# Patient Record
Sex: FEMALE | Race: WHITE | NOT HISPANIC OR LATINO | Employment: FULL TIME | URBAN - METROPOLITAN AREA
[De-identification: names, ages, dates, MRNs, and addresses within clinical notes are randomized per-mention and may not be internally consistent; named-entity substitution may affect disease eponyms.]

---

## 2017-01-30 ENCOUNTER — OFFICE VISIT (OUTPATIENT)
Dept: RETAIL CLINIC | Facility: CLINIC | Age: 47
End: 2017-01-30

## 2017-01-30 VITALS
HEART RATE: 75 BPM | RESPIRATION RATE: 24 BRPM | SYSTOLIC BLOOD PRESSURE: 124 MMHG | TEMPERATURE: 98.6 F | DIASTOLIC BLOOD PRESSURE: 88 MMHG | OXYGEN SATURATION: 98 %

## 2017-01-30 DIAGNOSIS — H10.32 ACUTE BACTERIAL CONJUNCTIVITIS OF LEFT EYE: Primary | ICD-10-CM

## 2017-01-30 PROCEDURE — 99213 OFFICE O/P EST LOW 20 MIN: CPT | Performed by: NURSE PRACTITIONER

## 2017-01-30 RX ORDER — ALLOPURINOL 300 MG/1
300 TABLET ORAL NIGHTLY
COMMUNITY

## 2017-01-30 RX ORDER — POLYMYXIN B SULFATE AND TRIMETHOPRIM 1; 10000 MG/ML; [USP'U]/ML
1 SOLUTION OPHTHALMIC EVERY 4 HOURS
Qty: 1 EACH | Refills: 0 | Status: SHIPPED | OUTPATIENT
Start: 2017-01-30 | End: 2017-02-06

## 2017-01-30 RX ORDER — PARICALCITOL 2 UG/1
2 CAPSULE, LIQUID FILLED ORAL NIGHTLY
COMMUNITY

## 2017-01-30 RX ORDER — ATORVASTATIN CALCIUM 40 MG/1
40 TABLET, FILM COATED ORAL NIGHTLY
COMMUNITY
End: 2020-11-22 | Stop reason: HOSPADM

## 2017-01-30 RX ORDER — SODIUM BICARBONATE 650 MG/1
1300 TABLET ORAL 2 TIMES DAILY
COMMUNITY

## 2017-01-30 RX ORDER — PREDNISONE 1 MG/1
5 TABLET ORAL NIGHTLY
COMMUNITY

## 2017-01-30 RX ORDER — ATENOLOL 100 MG/1
100 TABLET ORAL NIGHTLY
COMMUNITY
End: 2020-11-22 | Stop reason: HOSPADM

## 2017-01-30 RX ORDER — GLYBURIDE 2.5 MG/1
2.5 TABLET ORAL EVERY MORNING
COMMUNITY

## 2017-01-30 RX ORDER — OMEPRAZOLE 40 MG/1
40 CAPSULE, DELAYED RELEASE ORAL EVERY MORNING
COMMUNITY

## 2017-01-30 NOTE — LETTER
January 30, 2017     Patient: Kinga Taylor   YOB: 1970   Date of Visit: 1/30/2017       To Whom It May Concern:    It is my medical opinion that Kinga Taylor may return to work on 2/1/2017.           Sincerely,        [facility]    CC: No Recipients

## 2017-01-30 NOTE — PROGRESS NOTES
Ligia Taylor is a 46 y.o. female.     Conjunctivitis    The current episode started yesterday. The problem has been gradually worsening. The problem is mild. Nothing relieves the symptoms. Associated symptoms include eye itching, eye discharge, eye pain and eye redness. Pertinent negatives include no fever, no decreased vision, no double vision, no photophobia, no diarrhea, no nausea, no vomiting, no congestion, no rhinorrhea and no cough. The eye pain is mild. The left eye is affected. The eye pain is not associated with movement. The eyelid exhibits no abnormality.        The following portions of the patient's history were reviewed and updated as appropriate: allergies, current medications, past family history, past medical history, past social history, past surgical history and problem list.    Review of Systems   Constitutional: Negative for chills, diaphoresis, fatigue and fever.   HENT: Negative for congestion, rhinorrhea, sinus pressure and sneezing.    Eyes: Positive for pain, discharge, redness and itching. Negative for double vision, photophobia and visual disturbance.   Respiratory: Negative for cough.    Gastrointestinal: Negative for diarrhea, nausea and vomiting.   Allergic/Immunologic: Negative for environmental allergies.       Objective   Physical Exam   Constitutional: She is oriented to person, place, and time. She appears well-developed and well-nourished. She does not appear ill. No distress.   HENT:   Head: Normocephalic.   Right Ear: Hearing, tympanic membrane, external ear and ear canal normal.   Left Ear: Hearing, tympanic membrane, external ear and ear canal normal.   Nose: Nose normal. No rhinorrhea or sinus tenderness.   Mouth/Throat: Oropharynx is clear and moist and mucous membranes are normal. Tonsils are 2+ on the right. Tonsils are 2+ on the left. No tonsillar exudate.   Eyes: EOM are normal. Pupils are equal, round, and reactive to light. Left conjunctiva is  injected.   With corrective lenses on  Right 20/25  Left 20/40  Bilateral 20/25   Neck: No tracheal deviation present.   Cardiovascular: Normal rate, regular rhythm, S1 normal, S2 normal and normal heart sounds.    Pulmonary/Chest: Effort normal and breath sounds normal. No accessory muscle usage. No respiratory distress.   Abdominal: Soft. Bowel sounds are normal. There is no tenderness.   Lymphadenopathy:     She has no cervical adenopathy.   Neurological: She is alert and oriented to person, place, and time.   Skin: Skin is warm and dry.   Vitals reviewed.      Assessment/Plan   Diagnoses and all orders for this visit:    Acute bacterial conjunctivitis of left eye  -     trimethoprim-polymyxin b (POLYTRIM) 17431-9.1 UNIT/ML-% ophthalmic solution; Administer 1 drop into the left eye Every 4 (Four) Hours for 7 days.    Other orders  -     Tacrolimus (PROGRAF PO); Take  by mouth.  -     PREDNISONE PO; Take  by mouth.  -     ATENOLOL PO; Take  by mouth.  -     SODIUM BICARBONATE PO; Take  by mouth.  -     OMEPRAZOLE PO; Take  by mouth.  -     FLUCONAZOLE PO; Take  by mouth.  -     GLYBURIDE PO; Take  by mouth.  -     FAMOTIDINE PO; Take  by mouth.  -     ATORVASTATIN CALCIUM PO; Take  by mouth.  -     ALENDRONATE SODIUM PO; Take  by mouth.  -     PARICALCITOL PO; Take  by mouth.  -     ALLOPURINOL PO; Take  by mouth.  -     HYDROCODONE-ACETAMINOPHEN PO; Take  by mouth.  -     Ascorbic Acid (VITAMIN C ER PO); Take  by mouth.  -     VITAMIN E PO; Take  by mouth.  -     Calcium-Magnesium-Vitamin D (CALCIUM 500 PO); Take  by mouth.  -     Coenzyme Q10 (COQ10 PO); Take  by mouth.  -     CRANBERRY PO; Take  by mouth.  -     BuPROPion HCl (WELLBUTRIN PO); Take  by mouth.

## 2017-01-30 NOTE — MR AVS SNAPSHOT
Kinga Taylor   1/30/2017 10:15 AM   Office Visit    Dept Phone:  [phone]   Encounter #:  [id number]    Provider:  LEEANN CASTAÑEDA   Department:  [facility]                Your Full Care Plan              Today's Medication Changes          These changes are accurate as of: 1/30/17 11:21 AM.  If you have any questions, ask your nurse or doctor.               New Medication(s)Ordered:     trimethoprim-polymyxin b 39291-3.1 UNIT/ML-% ophthalmic solution   Commonly known as:  POLYTRIM   Administer 1 drop into the left eye Every 4 (Four) Hours for 7 days.            Where to Get Your Medications      These medications were sent to [facility] [address] [place], [place] - [address] [facility] [phone] [facility] [phone] [address] [address]     Phone:  [phone]     trimethoprim-polymyxin b 52070-4.1 UNIT/ML-% ophthalmic solution                  Your Updated Medication List          This list is accurate as of: 1/30/17 11:21 AM.  Always use your most recent med list.                ALENDRONATE SODIUM PO       ALLOPURINOL PO       ATENOLOL PO       ATORVASTATIN CALCIUM PO       CALCIUM 500 PO       COQ10 PO       CRANBERRY PO       FAMOTIDINE PO       FLUCONAZOLE PO       GLYBURIDE PO       HYDROCODONE-ACETAMINOPHEN PO       OMEPRAZOLE PO       PARICALCITOL PO       PREDNISONE PO       PROGRAF PO       SODIUM BICARBONATE PO       trimethoprim-polymyxin b 52969-6.1 UNIT/ML-% ophthalmic solution   Commonly known as:  POLYTRIM   Administer 1 drop into the left eye Every 4 (Four) Hours for 7 days.       VITAMIN C ER PO       VITAMIN E PO       WELLBUTRIN PO               You Were Diagnosed With        Codes Comments    Acute bacterial conjunctivitis of left eye    -  Primary ICD-10-CM: H10.32  ICD-9-CM: 372.03       Instructions    Bacterial Conjunctivitis  Bacterial conjunctivitis, commonly called pink eye, is an inflammation of the  clear membrane that covers the white part of the eye (conjunctiva). The inflammation can also happen on the underside of the eyelids. The blood vessels in the conjunctiva become inflamed, causing the eye to become red or pink. Bacterial conjunctivitis may spread easily from one eye to another and from person to person (contagious).   CAUSES   Bacterial conjunctivitis is caused by bacteria. The bacteria may come from your own skin, your upper respiratory tract, or from someone else with bacterial conjunctivitis.  SYMPTOMS   The normally white color of the eye or the underside of the eyelid is usually pink or red. The pink eye is usually associated with irritation, tearing, and some sensitivity to light. Bacterial conjunctivitis is often associated with a thick, yellowish discharge from the eye. The discharge may turn into a crust on the eyelids overnight, which causes your eyelids to stick together. If a discharge is present, there may also be some blurred vision in the affected eye.  DIAGNOSIS   Bacterial conjunctivitis is diagnosed by your caregiver through an eye exam and the symptoms that you report. Your caregiver looks for changes in the surface tissues of your eyes, which may point to the specific type of conjunctivitis. A sample of any discharge may be collected on a cotton-tip swab if you have a severe case of conjunctivitis, if your cornea is affected, or if you keep getting repeat infections that do not respond to treatment. The sample will be sent to a lab to see if the inflammation is caused by a bacterial infection and to see if the infection will respond to antibiotic medicines.  TREATMENT   · Bacterial conjunctivitis is treated with antibiotics. Antibiotic eyedrops are most often used. However, antibiotic ointments are also available. Antibiotics pills are sometimes used. Artificial tears or eye washes may ease discomfort.  HOME CARE INSTRUCTIONS   · To ease discomfort, apply a cool, clean washcloth  to your eye for 10-20 minutes, 3-4 times a day.  · Gently wipe away any drainage from your eye with a warm, wet washcloth or a cotton ball.  · Wash your hands often with soap and water. Use paper towels to dry your hands.  · Do not share towels or washcloths. This may spread the infection.  · Change or wash your pillowcase every day.  · You should not use eye makeup until the infection is gone.  · Do not operate machinery or drive if your vision is blurred.  · Stop using contact lenses. Ask your caregiver how to sterilize or replace your contacts before using them again. This depends on the type of contact lenses that you use.  · When applying medicine to the infected eye, do not touch the edge of your eyelid with the eyedrop bottle or ointment tube.  SEEK IMMEDIATE MEDICAL CARE IF:   · Your infection has not improved within 3 days after beginning treatment.  · You had yellow discharge from your eye and it returns.  · You have increased eye pain.  · Your eye redness is spreading.  · Your vision becomes blurred.  · You have a fever or persistent symptoms for more than 2-3 days.  · You have a fever and your symptoms suddenly get worse.  · You have facial pain, redness, or swelling.  MAKE SURE YOU:   · Understand these instructions.  · Will watch your condition.  · Will get help right away if you are not doing well or get worse.     This information is not intended to replace advice given to you by your health care provider. Make sure you discuss any questions you have with your health care provider.     Document Released: 12/18/2006 Document Revised: 01/08/2016 Document Reviewed: 05/20/2013  [employer] Interactive Patient Education ©2016 [employer]       Patient Instructions History      Upcoming Appointments     Visit Type Date Time Department    OFFICE VISIT 1/30/2017 10:15 AM [facility] [place]      [employer] Signup     [facility] [employer] allows you to send messages to your doctor, view your test results,  renew your prescriptions, schedule appointments, and more. To sign up, go to [employer]Mobile Authentication and click on the Sign Up Now link in the New User? box. Enter your [employer] Activation Code exactly as it appears below along with the last four digits of your Social Security Number and your Date of Birth () to complete the sign-up process. If you do not sign up before the expiration date, you must request a new code.    [employer] Activation Code: [email or web address]  Expires: 2017 11:20 AM    If you have questions, you can email [email or web address] or call [phone] to talk to our [employer] staff. Remember, [employer] is NOT to be used for urgent needs. For medical emergencies, dial 911.               Other Info from Your Visit           Allergies     Aldomet [Methyldopa]  Anaphylaxis    Dilaudid [Hydromorphone Hcl]  Itching    Morphine And Related  Itching      Reason for Visit     Conjunctivitis Left/painful/crusty/redness  x 1 day      Vital Signs     Blood Pressure Pulse Temperature Respirations Oxygen Saturation Smoking Status    124/88 75 98.6 °F (37 °C) 24 98% Never Smoker      Problems and Diagnoses Noted     Acute bacterial conjunctivitis of left eye    -  Primary

## 2017-05-24 ENCOUNTER — HOSPITAL ENCOUNTER (OUTPATIENT)
Dept: GENERAL RADIOLOGY | Facility: HOSPITAL | Age: 47
Discharge: HOME | End: 2017-05-24
Admitting: ORTHOPAEDIC SURGERY

## 2017-05-24 ENCOUNTER — APPOINTMENT (OUTPATIENT)
Dept: PREADMISSION TESTING | Facility: HOSPITAL | Age: 47
End: 2017-05-24

## 2017-05-24 VITALS
HEIGHT: 63 IN | WEIGHT: 175 LBS | OXYGEN SATURATION: 97 % | TEMPERATURE: 98 F | DIASTOLIC BLOOD PRESSURE: 86 MMHG | RESPIRATION RATE: 20 BRPM | SYSTOLIC BLOOD PRESSURE: 124 MMHG | HEART RATE: 79 BPM | BODY MASS INDEX: 31.01 KG/M2

## 2017-05-24 LAB
ABO GROUP BLD: NORMAL
ANION GAP SERPL CALCULATED.3IONS-SCNC: 13.6 MMOL/L
BACTERIA UR QL AUTO: ABNORMAL /HPF
BILIRUB UR QL STRIP: NEGATIVE
BLD GP AB SCN SERPL QL: NEGATIVE
BUN BLD-MCNC: 19 MG/DL (ref 6–20)
BUN/CREAT SERPL: 11.6 (ref 7–25)
CALCIUM SPEC-SCNC: 9.2 MG/DL (ref 8.6–10.5)
CHLORIDE SERPL-SCNC: 102 MMOL/L (ref 98–107)
CLARITY UR: ABNORMAL
CO2 SERPL-SCNC: 24.4 MMOL/L (ref 22–29)
COLOR UR: YELLOW
CREAT BLD-MCNC: 1.64 MG/DL (ref 0.57–1)
CRP SERPL-MCNC: 0.07 MG/DL (ref 0–0.5)
DEPRECATED RDW RBC AUTO: 50.8 FL (ref 37–54)
ERYTHROCYTE [DISTWIDTH] IN BLOOD BY AUTOMATED COUNT: 14.9 % (ref 11.7–13)
ERYTHROCYTE [SEDIMENTATION RATE] IN BLOOD: 20 MM/HR (ref 0–20)
GFR SERPL CREATININE-BSD FRML MDRD: 33 ML/MIN/1.73
GLUCOSE BLD-MCNC: 97 MG/DL (ref 65–99)
GLUCOSE UR STRIP-MCNC: NEGATIVE MG/DL
HBA1C MFR BLD: 6.4 % (ref 4.8–5.6)
HCT VFR BLD AUTO: 35.7 % (ref 35.6–45.5)
HGB BLD-MCNC: 12.1 G/DL (ref 11.9–15.5)
HGB UR QL STRIP.AUTO: ABNORMAL
HYALINE CASTS UR QL AUTO: ABNORMAL /LPF
INR PPP: 1.03 (ref 0.9–1.1)
KETONES UR QL STRIP: NEGATIVE
LEUKOCYTE ESTERASE UR QL STRIP.AUTO: ABNORMAL
MCH RBC QN AUTO: 31.7 PG (ref 26.9–32)
MCHC RBC AUTO-ENTMCNC: 33.9 G/DL (ref 32.4–36.3)
MCV RBC AUTO: 93.5 FL (ref 80.5–98.2)
NITRITE UR QL STRIP: NEGATIVE
PH UR STRIP.AUTO: 6.5 [PH] (ref 5–8)
PLATELET # BLD AUTO: 159 10*3/MM3 (ref 140–500)
PMV BLD AUTO: 10.3 FL (ref 6–12)
POTASSIUM BLD-SCNC: 4.4 MMOL/L (ref 3.5–5.2)
PROT UR QL STRIP: NEGATIVE
PROTHROMBIN TIME: 13.1 SECONDS (ref 11.7–14.2)
RBC # BLD AUTO: 3.82 10*6/MM3 (ref 3.9–5.2)
RBC # UR: ABNORMAL /HPF
REF LAB TEST METHOD: ABNORMAL
RH BLD: POSITIVE
SODIUM BLD-SCNC: 140 MMOL/L (ref 136–145)
SP GR UR STRIP: 1.01 (ref 1–1.03)
SQUAMOUS #/AREA URNS HPF: ABNORMAL /HPF
UROBILINOGEN UR QL STRIP: ABNORMAL
WBC NRBC COR # BLD: 11.18 10*3/MM3 (ref 4.5–10.7)
WBC UR QL AUTO: ABNORMAL /HPF

## 2017-05-24 PROCEDURE — 86900 BLOOD TYPING SEROLOGIC ABO: CPT | Performed by: ORTHOPAEDIC SURGERY

## 2017-05-24 PROCEDURE — 86850 RBC ANTIBODY SCREEN: CPT | Performed by: ORTHOPAEDIC SURGERY

## 2017-05-24 PROCEDURE — 87086 URINE CULTURE/COLONY COUNT: CPT | Performed by: ORTHOPAEDIC SURGERY

## 2017-05-24 PROCEDURE — 86901 BLOOD TYPING SEROLOGIC RH(D): CPT | Performed by: ORTHOPAEDIC SURGERY

## 2017-05-24 PROCEDURE — 85652 RBC SED RATE AUTOMATED: CPT | Performed by: ORTHOPAEDIC SURGERY

## 2017-05-24 PROCEDURE — 93010 ELECTROCARDIOGRAM REPORT: CPT | Performed by: INTERNAL MEDICINE

## 2017-05-24 PROCEDURE — 36415 COLL VENOUS BLD VENIPUNCTURE: CPT

## 2017-05-24 PROCEDURE — 81001 URINALYSIS AUTO W/SCOPE: CPT | Performed by: ORTHOPAEDIC SURGERY

## 2017-05-24 PROCEDURE — 83036 HEMOGLOBIN GLYCOSYLATED A1C: CPT | Performed by: ORTHOPAEDIC SURGERY

## 2017-05-24 PROCEDURE — 86140 C-REACTIVE PROTEIN: CPT | Performed by: ORTHOPAEDIC SURGERY

## 2017-05-24 PROCEDURE — 85027 COMPLETE CBC AUTOMATED: CPT | Performed by: ORTHOPAEDIC SURGERY

## 2017-05-24 PROCEDURE — 93005 ELECTROCARDIOGRAM TRACING: CPT

## 2017-05-24 PROCEDURE — 71020 HC CHEST PA AND LATERAL: CPT

## 2017-05-24 PROCEDURE — 85610 PROTHROMBIN TIME: CPT | Performed by: ORTHOPAEDIC SURGERY

## 2017-05-24 PROCEDURE — 87077 CULTURE AEROBIC IDENTIFY: CPT | Performed by: ORTHOPAEDIC SURGERY

## 2017-05-24 PROCEDURE — 80048 BASIC METABOLIC PNL TOTAL CA: CPT | Performed by: ORTHOPAEDIC SURGERY

## 2017-05-24 PROCEDURE — 87186 SC STD MICRODIL/AGAR DIL: CPT | Performed by: ORTHOPAEDIC SURGERY

## 2017-05-24 RX ORDER — HYDROCODONE BITARTRATE AND ACETAMINOPHEN 10; 325 MG/1; MG/1
1 TABLET ORAL 4 TIMES DAILY
COMMUNITY
End: 2017-06-29 | Stop reason: HOSPADM

## 2017-05-24 RX ORDER — CHLORHEXIDINE GLUCONATE 500 MG/1
1 CLOTH TOPICAL
COMMUNITY
End: 2017-06-29 | Stop reason: HOSPADM

## 2017-05-26 LAB — BACTERIA SPEC AEROBE CULT: ABNORMAL

## 2017-06-28 ENCOUNTER — ANESTHESIA (OUTPATIENT)
Dept: PERIOP | Facility: HOSPITAL | Age: 47
End: 2017-06-28

## 2017-06-28 ENCOUNTER — ANESTHESIA EVENT (OUTPATIENT)
Dept: PERIOP | Facility: HOSPITAL | Age: 47
End: 2017-06-28

## 2017-06-28 ENCOUNTER — APPOINTMENT (OUTPATIENT)
Dept: GENERAL RADIOLOGY | Facility: HOSPITAL | Age: 47
End: 2017-06-28

## 2017-06-28 ENCOUNTER — HOSPITAL ENCOUNTER (INPATIENT)
Facility: HOSPITAL | Age: 47
LOS: 1 days | Discharge: HOME HEALTH | End: 2017-06-29
Attending: ORTHOPAEDIC SURGERY | Admitting: ORTHOPAEDIC SURGERY

## 2017-06-28 DIAGNOSIS — T84.018A FAILED TOTAL HIP ARTHROPLASTY (HCC): ICD-10-CM

## 2017-06-28 DIAGNOSIS — Z96.649 FAILED TOTAL HIP ARTHROPLASTY (HCC): ICD-10-CM

## 2017-06-28 DIAGNOSIS — R26.2 DIFFICULTY WALKING: Primary | ICD-10-CM

## 2017-06-28 LAB
ABO GROUP BLD: NORMAL
B-HCG UR QL: NEGATIVE
BACTERIA UR QL AUTO: ABNORMAL /HPF
BILIRUB UR QL STRIP: NEGATIVE
BLD GP AB SCN SERPL QL: NEGATIVE
CLARITY UR: ABNORMAL
COLOR UR: YELLOW
GLUCOSE BLDC GLUCOMTR-MCNC: 118 MG/DL (ref 70–130)
GLUCOSE UR STRIP-MCNC: NEGATIVE MG/DL
HCT VFR BLD AUTO: 34.1 % (ref 35.6–45.5)
HGB BLD-MCNC: 11.7 G/DL (ref 11.9–15.5)
HGB UR QL STRIP.AUTO: ABNORMAL
HYALINE CASTS UR QL AUTO: ABNORMAL /LPF
INR PPP: 1.09 (ref 0.9–1.1)
INTERNAL NEGATIVE CONTROL: NEGATIVE
INTERNAL POSITIVE CONTROL: POSITIVE
KETONES UR QL STRIP: NEGATIVE
LEUKOCYTE ESTERASE UR QL STRIP.AUTO: NEGATIVE
Lab: NORMAL
NITRITE UR QL STRIP: NEGATIVE
PH UR STRIP.AUTO: 6 [PH] (ref 5–8)
PROT UR QL STRIP: ABNORMAL
PROTHROMBIN TIME: 13.7 SECONDS (ref 11.7–14.2)
RBC # UR: ABNORMAL /HPF
REF LAB TEST METHOD: ABNORMAL
RH BLD: POSITIVE
SP GR UR STRIP: 1.01 (ref 1–1.03)
SQUAMOUS #/AREA URNS HPF: ABNORMAL /HPF
UROBILINOGEN UR QL STRIP: ABNORMAL
WBC UR QL AUTO: ABNORMAL /HPF

## 2017-06-28 PROCEDURE — C1776 JOINT DEVICE (IMPLANTABLE): HCPCS | Performed by: ORTHOPAEDIC SURGERY

## 2017-06-28 PROCEDURE — 25010000002 NEOSTIGMINE PER 0.5 MG: Performed by: NURSE ANESTHETIST, CERTIFIED REGISTERED

## 2017-06-28 PROCEDURE — 87070 CULTURE OTHR SPECIMN AEROBIC: CPT | Performed by: ORTHOPAEDIC SURGERY

## 2017-06-28 PROCEDURE — 87075 CULTR BACTERIA EXCEPT BLOOD: CPT | Performed by: ORTHOPAEDIC SURGERY

## 2017-06-28 PROCEDURE — 97162 PT EVAL MOD COMPLEX 30 MIN: CPT

## 2017-06-28 PROCEDURE — 25010000002 CLONIDINE PER 1 MG: Performed by: ORTHOPAEDIC SURGERY

## 2017-06-28 PROCEDURE — 82962 GLUCOSE BLOOD TEST: CPT

## 2017-06-28 PROCEDURE — 25010000002 ONDANSETRON PER 1 MG: Performed by: NURSE ANESTHETIST, CERTIFIED REGISTERED

## 2017-06-28 PROCEDURE — 25010000002 EPINEPHRINE PER 0.1 MG: Performed by: ORTHOPAEDIC SURGERY

## 2017-06-28 PROCEDURE — 25010000002 HYDROMORPHONE PER 4 MG: Performed by: ANESTHESIOLOGY

## 2017-06-28 PROCEDURE — 63710000001 PREDNISONE PER 5 MG: Performed by: ORTHOPAEDIC SURGERY

## 2017-06-28 PROCEDURE — 85018 HEMOGLOBIN: CPT | Performed by: ORTHOPAEDIC SURGERY

## 2017-06-28 PROCEDURE — 25010000002 ROPIVACAINE PER 1 MG: Performed by: ORTHOPAEDIC SURGERY

## 2017-06-28 PROCEDURE — 25010000002 FENTANYL CITRATE (PF) 100 MCG/2ML SOLUTION: Performed by: ANESTHESIOLOGY

## 2017-06-28 PROCEDURE — 86900 BLOOD TYPING SEROLOGIC ABO: CPT | Performed by: ORTHOPAEDIC SURGERY

## 2017-06-28 PROCEDURE — 72170 X-RAY EXAM OF PELVIS: CPT

## 2017-06-28 PROCEDURE — 25010000002 KETOROLAC TROMETHAMINE PER 15 MG: Performed by: ORTHOPAEDIC SURGERY

## 2017-06-28 PROCEDURE — 25010000002 MIDAZOLAM PER 1 MG: Performed by: ANESTHESIOLOGY

## 2017-06-28 PROCEDURE — 85610 PROTHROMBIN TIME: CPT | Performed by: ORTHOPAEDIC SURGERY

## 2017-06-28 PROCEDURE — 81001 URINALYSIS AUTO W/SCOPE: CPT | Performed by: ORTHOPAEDIC SURGERY

## 2017-06-28 PROCEDURE — 87086 URINE CULTURE/COLONY COUNT: CPT | Performed by: ORTHOPAEDIC SURGERY

## 2017-06-28 PROCEDURE — 0SUE09Z SUPPLEMENT LEFT HIP JOINT, ACETABULAR SURFACE WITH LINER, OPEN APPROACH: ICD-10-PCS | Performed by: ORTHOPAEDIC SURGERY

## 2017-06-28 PROCEDURE — 87205 SMEAR GRAM STAIN: CPT | Performed by: ORTHOPAEDIC SURGERY

## 2017-06-28 PROCEDURE — 0SPB09Z REMOVAL OF LINER FROM LEFT HIP JOINT, OPEN APPROACH: ICD-10-PCS | Performed by: ORTHOPAEDIC SURGERY

## 2017-06-28 PROCEDURE — 85014 HEMATOCRIT: CPT | Performed by: ORTHOPAEDIC SURGERY

## 2017-06-28 PROCEDURE — 0SPS0JZ REMOVAL OF SYNTHETIC SUBSTITUTE FROM LEFT HIP JOINT, FEMORAL SURFACE, OPEN APPROACH: ICD-10-PCS | Performed by: ORTHOPAEDIC SURGERY

## 2017-06-28 PROCEDURE — 97110 THERAPEUTIC EXERCISES: CPT

## 2017-06-28 PROCEDURE — 86850 RBC ANTIBODY SCREEN: CPT | Performed by: ORTHOPAEDIC SURGERY

## 2017-06-28 PROCEDURE — 25010000002 DEXAMETHASONE PER 1 MG: Performed by: NURSE ANESTHETIST, CERTIFIED REGISTERED

## 2017-06-28 PROCEDURE — 0SRS0JZ REPLACEMENT OF LEFT HIP JOINT, FEMORAL SURFACE WITH SYNTHETIC SUBSTITUTE, OPEN APPROACH: ICD-10-PCS | Performed by: ORTHOPAEDIC SURGERY

## 2017-06-28 PROCEDURE — 25010000002 PROPOFOL 10 MG/ML EMULSION: Performed by: NURSE ANESTHETIST, CERTIFIED REGISTERED

## 2017-06-28 PROCEDURE — 86901 BLOOD TYPING SEROLOGIC RH(D): CPT | Performed by: ORTHOPAEDIC SURGERY

## 2017-06-28 PROCEDURE — 25010000003 CEFAZOLIN IN DEXTROSE 2-4 GM/100ML-% SOLUTION: Performed by: ORTHOPAEDIC SURGERY

## 2017-06-28 PROCEDURE — 25010000002 METHYLPREDNISOLONE PER 125 MG: Performed by: ANESTHESIOLOGY

## 2017-06-28 PROCEDURE — 25010000003 CEFAZOLIN IN DEXTROSE 2-4 GM/100ML-% SOLUTION: Performed by: NURSE ANESTHETIST, CERTIFIED REGISTERED

## 2017-06-28 DEVICE — IMPLANTABLE DEVICE: Type: IMPLANTABLE DEVICE | Site: HIP | Status: FUNCTIONAL

## 2017-06-28 RX ORDER — MIDAZOLAM HYDROCHLORIDE 1 MG/ML
1 INJECTION INTRAMUSCULAR; INTRAVENOUS
Status: DISCONTINUED | OUTPATIENT
Start: 2017-06-28 | End: 2017-06-28 | Stop reason: HOSPADM

## 2017-06-28 RX ORDER — EPHEDRINE SULFATE 50 MG/ML
5 INJECTION, SOLUTION INTRAVENOUS ONCE AS NEEDED
Status: DISCONTINUED | OUTPATIENT
Start: 2017-06-28 | End: 2017-06-28 | Stop reason: HOSPADM

## 2017-06-28 RX ORDER — ONDANSETRON 2 MG/ML
4 INJECTION INTRAMUSCULAR; INTRAVENOUS ONCE AS NEEDED
Status: DISCONTINUED | OUTPATIENT
Start: 2017-06-28 | End: 2017-06-28 | Stop reason: HOSPADM

## 2017-06-28 RX ORDER — CEFAZOLIN SODIUM 2 G/100ML
2 INJECTION, SOLUTION INTRAVENOUS EVERY 8 HOURS
Status: COMPLETED | OUTPATIENT
Start: 2017-06-28 | End: 2017-06-29

## 2017-06-28 RX ORDER — NALOXONE HCL 0.4 MG/ML
0.2 VIAL (ML) INJECTION AS NEEDED
Status: DISCONTINUED | OUTPATIENT
Start: 2017-06-28 | End: 2017-06-28 | Stop reason: HOSPADM

## 2017-06-28 RX ORDER — ATORVASTATIN CALCIUM 20 MG/1
40 TABLET, FILM COATED ORAL NIGHTLY
Status: DISCONTINUED | OUTPATIENT
Start: 2017-06-28 | End: 2017-06-29 | Stop reason: HOSPADM

## 2017-06-28 RX ORDER — SODIUM CHLORIDE 0.9 % (FLUSH) 0.9 %
1-10 SYRINGE (ML) INJECTION AS NEEDED
Status: DISCONTINUED | OUTPATIENT
Start: 2017-06-28 | End: 2017-06-29 | Stop reason: HOSPADM

## 2017-06-28 RX ORDER — HYDROMORPHONE HYDROCHLORIDE 1 MG/ML
0.5 INJECTION, SOLUTION INTRAMUSCULAR; INTRAVENOUS; SUBCUTANEOUS
Status: DISCONTINUED | OUTPATIENT
Start: 2017-06-28 | End: 2017-06-28 | Stop reason: HOSPADM

## 2017-06-28 RX ORDER — PROMETHAZINE HYDROCHLORIDE 25 MG/ML
12.5 INJECTION, SOLUTION INTRAMUSCULAR; INTRAVENOUS ONCE AS NEEDED
Status: DISCONTINUED | OUTPATIENT
Start: 2017-06-28 | End: 2017-06-29 | Stop reason: HOSPADM

## 2017-06-28 RX ORDER — ONDANSETRON 2 MG/ML
4 INJECTION INTRAMUSCULAR; INTRAVENOUS ONCE AS NEEDED
Status: COMPLETED | OUTPATIENT
Start: 2017-06-28 | End: 2017-06-28

## 2017-06-28 RX ORDER — PROMETHAZINE HYDROCHLORIDE 25 MG/1
25 TABLET ORAL ONCE AS NEEDED
Status: DISCONTINUED | OUTPATIENT
Start: 2017-06-28 | End: 2017-06-28 | Stop reason: HOSPADM

## 2017-06-28 RX ORDER — CEFAZOLIN SODIUM 2 G/100ML
2 INJECTION, SOLUTION INTRAVENOUS ONCE
Status: DISCONTINUED | OUTPATIENT
Start: 2017-06-28 | End: 2017-06-28 | Stop reason: HOSPADM

## 2017-06-28 RX ORDER — PROMETHAZINE HYDROCHLORIDE 25 MG/1
25 TABLET ORAL ONCE AS NEEDED
Status: DISCONTINUED | OUTPATIENT
Start: 2017-06-28 | End: 2017-06-29 | Stop reason: HOSPADM

## 2017-06-28 RX ORDER — PROMETHAZINE HYDROCHLORIDE 25 MG/1
25 SUPPOSITORY RECTAL ONCE AS NEEDED
Status: DISCONTINUED | OUTPATIENT
Start: 2017-06-28 | End: 2017-06-29 | Stop reason: HOSPADM

## 2017-06-28 RX ORDER — PROMETHAZINE HYDROCHLORIDE 25 MG/ML
12.5 INJECTION, SOLUTION INTRAMUSCULAR; INTRAVENOUS ONCE AS NEEDED
Status: DISCONTINUED | OUTPATIENT
Start: 2017-06-28 | End: 2017-06-28 | Stop reason: HOSPADM

## 2017-06-28 RX ORDER — ALLOPURINOL 300 MG/1
300 TABLET ORAL NIGHTLY
Status: DISCONTINUED | OUTPATIENT
Start: 2017-06-28 | End: 2017-06-29 | Stop reason: HOSPADM

## 2017-06-28 RX ORDER — DIPHENHYDRAMINE HYDROCHLORIDE 50 MG/ML
12.5 INJECTION INTRAMUSCULAR; INTRAVENOUS
Status: DISCONTINUED | OUTPATIENT
Start: 2017-06-28 | End: 2017-06-28 | Stop reason: HOSPADM

## 2017-06-28 RX ORDER — DOXYCYCLINE HYCLATE 100 MG/1
100 CAPSULE ORAL 2 TIMES DAILY
Status: DISCONTINUED | OUTPATIENT
Start: 2017-06-28 | End: 2017-06-29 | Stop reason: HOSPADM

## 2017-06-28 RX ORDER — FLUMAZENIL 0.1 MG/ML
0.2 INJECTION INTRAVENOUS AS NEEDED
Status: DISCONTINUED | OUTPATIENT
Start: 2017-06-28 | End: 2017-06-29 | Stop reason: HOSPADM

## 2017-06-28 RX ORDER — FENTANYL CITRATE 50 UG/ML
50 INJECTION, SOLUTION INTRAMUSCULAR; INTRAVENOUS
Status: DISCONTINUED | OUTPATIENT
Start: 2017-06-28 | End: 2017-06-28 | Stop reason: HOSPADM

## 2017-06-28 RX ORDER — LIDOCAINE HYDROCHLORIDE 20 MG/ML
INJECTION, SOLUTION INFILTRATION; PERINEURAL AS NEEDED
Status: DISCONTINUED | OUTPATIENT
Start: 2017-06-28 | End: 2017-06-28 | Stop reason: SURG

## 2017-06-28 RX ORDER — OXYCODONE AND ACETAMINOPHEN 10; 325 MG/1; MG/1
1 TABLET ORAL EVERY 4 HOURS PRN
Status: DISCONTINUED | OUTPATIENT
Start: 2017-06-28 | End: 2017-06-29 | Stop reason: HOSPADM

## 2017-06-28 RX ORDER — LABETALOL HYDROCHLORIDE 5 MG/ML
5 INJECTION, SOLUTION INTRAVENOUS
Status: DISCONTINUED | OUTPATIENT
Start: 2017-06-28 | End: 2017-06-29 | Stop reason: HOSPADM

## 2017-06-28 RX ORDER — OXYCODONE HCL 10 MG/1
10 TABLET, FILM COATED, EXTENDED RELEASE ORAL EVERY 12 HOURS SCHEDULED
Status: DISCONTINUED | OUTPATIENT
Start: 2017-06-28 | End: 2017-06-29 | Stop reason: HOSPADM

## 2017-06-28 RX ORDER — ONDANSETRON 4 MG/1
4 TABLET, FILM COATED ORAL EVERY 6 HOURS PRN
Status: DISCONTINUED | OUTPATIENT
Start: 2017-06-28 | End: 2017-06-29 | Stop reason: HOSPADM

## 2017-06-28 RX ORDER — FAMOTIDINE 10 MG
10 TABLET ORAL NIGHTLY
Status: DISCONTINUED | OUTPATIENT
Start: 2017-06-28 | End: 2017-06-29 | Stop reason: HOSPADM

## 2017-06-28 RX ORDER — HYDRALAZINE HYDROCHLORIDE 20 MG/ML
5 INJECTION INTRAMUSCULAR; INTRAVENOUS
Status: DISCONTINUED | OUTPATIENT
Start: 2017-06-28 | End: 2017-06-29 | Stop reason: HOSPADM

## 2017-06-28 RX ORDER — FERROUS SULFATE 325(65) MG
325 TABLET ORAL
Status: DISCONTINUED | OUTPATIENT
Start: 2017-06-28 | End: 2017-06-29 | Stop reason: HOSPADM

## 2017-06-28 RX ORDER — WARFARIN SODIUM 5 MG/1
5 TABLET ORAL
Status: COMPLETED | OUTPATIENT
Start: 2017-06-28 | End: 2017-06-28

## 2017-06-28 RX ORDER — SODIUM CHLORIDE, SODIUM LACTATE, POTASSIUM CHLORIDE, CALCIUM CHLORIDE 600; 310; 30; 20 MG/100ML; MG/100ML; MG/100ML; MG/100ML
100 INJECTION, SOLUTION INTRAVENOUS CONTINUOUS
Status: DISCONTINUED | OUTPATIENT
Start: 2017-06-28 | End: 2017-06-29 | Stop reason: HOSPADM

## 2017-06-28 RX ORDER — GLYCOPYRROLATE 0.2 MG/ML
INJECTION INTRAMUSCULAR; INTRAVENOUS AS NEEDED
Status: DISCONTINUED | OUTPATIENT
Start: 2017-06-28 | End: 2017-06-28 | Stop reason: SURG

## 2017-06-28 RX ORDER — OXYCODONE AND ACETAMINOPHEN 7.5; 325 MG/1; MG/1
1 TABLET ORAL ONCE AS NEEDED
Status: DISCONTINUED | OUTPATIENT
Start: 2017-06-28 | End: 2017-06-28 | Stop reason: HOSPADM

## 2017-06-28 RX ORDER — GLIPIZIDE 5 MG/1
5 TABLET ORAL
Status: DISCONTINUED | OUTPATIENT
Start: 2017-06-28 | End: 2017-06-29 | Stop reason: HOSPADM

## 2017-06-28 RX ORDER — PROPOFOL 10 MG/ML
VIAL (ML) INTRAVENOUS AS NEEDED
Status: DISCONTINUED | OUTPATIENT
Start: 2017-06-28 | End: 2017-06-28 | Stop reason: SURG

## 2017-06-28 RX ORDER — PARICALCITOL 1 UG/1
2 CAPSULE, LIQUID FILLED ORAL NIGHTLY
Status: DISCONTINUED | OUTPATIENT
Start: 2017-06-28 | End: 2017-06-29 | Stop reason: HOSPADM

## 2017-06-28 RX ORDER — PROMETHAZINE HYDROCHLORIDE 25 MG/1
12.5 TABLET ORAL ONCE AS NEEDED
Status: DISCONTINUED | OUTPATIENT
Start: 2017-06-28 | End: 2017-06-28 | Stop reason: HOSPADM

## 2017-06-28 RX ORDER — DIPHENHYDRAMINE HYDROCHLORIDE 50 MG/ML
12.5 INJECTION INTRAMUSCULAR; INTRAVENOUS
Status: DISCONTINUED | OUTPATIENT
Start: 2017-06-28 | End: 2017-06-29 | Stop reason: HOSPADM

## 2017-06-28 RX ORDER — SODIUM BICARBONATE 650 MG/1
325 TABLET ORAL 2 TIMES DAILY
Status: DISCONTINUED | OUTPATIENT
Start: 2017-06-28 | End: 2017-06-29 | Stop reason: HOSPADM

## 2017-06-28 RX ORDER — SODIUM CHLORIDE, SODIUM LACTATE, POTASSIUM CHLORIDE, CALCIUM CHLORIDE 600; 310; 30; 20 MG/100ML; MG/100ML; MG/100ML; MG/100ML
9 INJECTION, SOLUTION INTRAVENOUS CONTINUOUS
Status: DISCONTINUED | OUTPATIENT
Start: 2017-06-28 | End: 2017-06-29 | Stop reason: HOSPADM

## 2017-06-28 RX ORDER — NALOXONE HCL 0.4 MG/ML
0.2 VIAL (ML) INJECTION AS NEEDED
Status: DISCONTINUED | OUTPATIENT
Start: 2017-06-28 | End: 2017-06-29 | Stop reason: HOSPADM

## 2017-06-28 RX ORDER — FAMOTIDINE 10 MG/ML
20 INJECTION, SOLUTION INTRAVENOUS ONCE
Status: COMPLETED | OUTPATIENT
Start: 2017-06-28 | End: 2017-06-28

## 2017-06-28 RX ORDER — CEFAZOLIN SODIUM 2 G/100ML
INJECTION, SOLUTION INTRAVENOUS AS NEEDED
Status: DISCONTINUED | OUTPATIENT
Start: 2017-06-28 | End: 2017-06-28 | Stop reason: SURG

## 2017-06-28 RX ORDER — FLUMAZENIL 0.1 MG/ML
0.2 INJECTION INTRAVENOUS AS NEEDED
Status: DISCONTINUED | OUTPATIENT
Start: 2017-06-28 | End: 2017-06-28 | Stop reason: HOSPADM

## 2017-06-28 RX ORDER — SENNA AND DOCUSATE SODIUM 50; 8.6 MG/1; MG/1
2 TABLET, FILM COATED ORAL 2 TIMES DAILY
Status: DISCONTINUED | OUTPATIENT
Start: 2017-06-28 | End: 2017-06-29 | Stop reason: HOSPADM

## 2017-06-28 RX ORDER — FENTANYL CITRATE 50 UG/ML
50 INJECTION, SOLUTION INTRAMUSCULAR; INTRAVENOUS
Status: DISCONTINUED | OUTPATIENT
Start: 2017-06-28 | End: 2017-06-29 | Stop reason: HOSPADM

## 2017-06-28 RX ORDER — ONDANSETRON 2 MG/ML
INJECTION INTRAMUSCULAR; INTRAVENOUS AS NEEDED
Status: DISCONTINUED | OUTPATIENT
Start: 2017-06-28 | End: 2017-06-28 | Stop reason: SURG

## 2017-06-28 RX ORDER — HYDRALAZINE HYDROCHLORIDE 20 MG/ML
5 INJECTION INTRAMUSCULAR; INTRAVENOUS
Status: DISCONTINUED | OUTPATIENT
Start: 2017-06-28 | End: 2017-06-28 | Stop reason: HOSPADM

## 2017-06-28 RX ORDER — POVIDONE-IODINE 10 MG/G
OINTMENT TOPICAL AS NEEDED
Status: DISCONTINUED | OUTPATIENT
Start: 2017-06-28 | End: 2017-06-28 | Stop reason: HOSPADM

## 2017-06-28 RX ORDER — DIPHENHYDRAMINE HCL 25 MG
25 CAPSULE ORAL EVERY 6 HOURS PRN
Status: DISCONTINUED | OUTPATIENT
Start: 2017-06-28 | End: 2017-06-29 | Stop reason: HOSPADM

## 2017-06-28 RX ORDER — ROCURONIUM BROMIDE 10 MG/ML
INJECTION, SOLUTION INTRAVENOUS AS NEEDED
Status: DISCONTINUED | OUTPATIENT
Start: 2017-06-28 | End: 2017-06-28 | Stop reason: SURG

## 2017-06-28 RX ORDER — PREDNISONE 1 MG/1
5 TABLET ORAL NIGHTLY
Status: DISCONTINUED | OUTPATIENT
Start: 2017-06-28 | End: 2017-06-29 | Stop reason: HOSPADM

## 2017-06-28 RX ORDER — DOCUSATE SODIUM 100 MG/1
100 CAPSULE, LIQUID FILLED ORAL 2 TIMES DAILY PRN
Status: DISCONTINUED | OUTPATIENT
Start: 2017-06-28 | End: 2017-06-29 | Stop reason: HOSPADM

## 2017-06-28 RX ORDER — DEXAMETHASONE SODIUM PHOSPHATE 10 MG/ML
INJECTION INTRAMUSCULAR; INTRAVENOUS AS NEEDED
Status: DISCONTINUED | OUTPATIENT
Start: 2017-06-28 | End: 2017-06-28 | Stop reason: SURG

## 2017-06-28 RX ORDER — BUPROPION HYDROCHLORIDE 100 MG/1
300 TABLET ORAL EVERY MORNING
Status: DISCONTINUED | OUTPATIENT
Start: 2017-06-29 | End: 2017-06-29 | Stop reason: HOSPADM

## 2017-06-28 RX ORDER — ATENOLOL 100 MG/1
100 TABLET ORAL NIGHTLY
Status: DISCONTINUED | OUTPATIENT
Start: 2017-06-28 | End: 2017-06-29 | Stop reason: HOSPADM

## 2017-06-28 RX ORDER — ASCORBIC ACID 500 MG
500 TABLET ORAL DAILY
Status: DISCONTINUED | OUTPATIENT
Start: 2017-06-28 | End: 2017-06-29 | Stop reason: HOSPADM

## 2017-06-28 RX ORDER — PANTOPRAZOLE SODIUM 40 MG/1
40 TABLET, DELAYED RELEASE ORAL EVERY MORNING
Status: DISCONTINUED | OUTPATIENT
Start: 2017-06-29 | End: 2017-06-29 | Stop reason: HOSPADM

## 2017-06-28 RX ORDER — ACETAMINOPHEN 325 MG/1
325 TABLET ORAL EVERY 4 HOURS PRN
Status: DISCONTINUED | OUTPATIENT
Start: 2017-06-28 | End: 2017-06-29 | Stop reason: HOSPADM

## 2017-06-28 RX ORDER — EPHEDRINE SULFATE 50 MG/ML
5 INJECTION, SOLUTION INTRAVENOUS ONCE AS NEEDED
Status: DISCONTINUED | OUTPATIENT
Start: 2017-06-28 | End: 2017-06-29 | Stop reason: HOSPADM

## 2017-06-28 RX ORDER — BISACODYL 5 MG/1
10 TABLET, DELAYED RELEASE ORAL DAILY PRN
Status: DISCONTINUED | OUTPATIENT
Start: 2017-06-28 | End: 2017-06-29 | Stop reason: HOSPADM

## 2017-06-28 RX ORDER — LABETALOL HYDROCHLORIDE 5 MG/ML
5 INJECTION, SOLUTION INTRAVENOUS
Status: DISCONTINUED | OUTPATIENT
Start: 2017-06-28 | End: 2017-06-28 | Stop reason: HOSPADM

## 2017-06-28 RX ORDER — PROMETHAZINE HYDROCHLORIDE 25 MG/1
12.5 TABLET ORAL ONCE AS NEEDED
Status: DISCONTINUED | OUTPATIENT
Start: 2017-06-28 | End: 2017-06-29 | Stop reason: HOSPADM

## 2017-06-28 RX ORDER — MIDAZOLAM HYDROCHLORIDE 1 MG/ML
2 INJECTION INTRAMUSCULAR; INTRAVENOUS
Status: DISCONTINUED | OUTPATIENT
Start: 2017-06-28 | End: 2017-06-28 | Stop reason: HOSPADM

## 2017-06-28 RX ORDER — SODIUM CHLORIDE 0.9 % (FLUSH) 0.9 %
1-10 SYRINGE (ML) INJECTION AS NEEDED
Status: DISCONTINUED | OUTPATIENT
Start: 2017-06-28 | End: 2017-06-28 | Stop reason: HOSPADM

## 2017-06-28 RX ORDER — HYDROCODONE BITARTRATE AND ACETAMINOPHEN 7.5; 325 MG/1; MG/1
1 TABLET ORAL ONCE AS NEEDED
Status: DISCONTINUED | OUTPATIENT
Start: 2017-06-28 | End: 2017-06-28 | Stop reason: HOSPADM

## 2017-06-28 RX ORDER — METHYLPREDNISOLONE SODIUM SUCCINATE 125 MG/2ML
100 INJECTION, POWDER, LYOPHILIZED, FOR SOLUTION INTRAMUSCULAR; INTRAVENOUS ONCE
Status: COMPLETED | OUTPATIENT
Start: 2017-06-28 | End: 2017-06-28

## 2017-06-28 RX ORDER — PROMETHAZINE HYDROCHLORIDE 25 MG/1
25 SUPPOSITORY RECTAL ONCE AS NEEDED
Status: DISCONTINUED | OUTPATIENT
Start: 2017-06-28 | End: 2017-06-28 | Stop reason: HOSPADM

## 2017-06-28 RX ADMIN — PARICALCITOL 2 MCG: 1 CAPSULE ORAL at 21:37

## 2017-06-28 RX ADMIN — PREDNISONE 5 MG: 5 TABLET ORAL at 21:37

## 2017-06-28 RX ADMIN — ONDANSETRON 4 MG: 2 INJECTION INTRAMUSCULAR; INTRAVENOUS at 09:39

## 2017-06-28 RX ADMIN — FENTANYL CITRATE 100 MCG: 50 INJECTION INTRAMUSCULAR; INTRAVENOUS at 09:14

## 2017-06-28 RX ADMIN — HYDROMORPHONE HYDROCHLORIDE 0.5 MG: 1 INJECTION, SOLUTION INTRAMUSCULAR; INTRAVENOUS; SUBCUTANEOUS at 12:05

## 2017-06-28 RX ADMIN — PROPOFOL 150 MG: 10 INJECTION, EMULSION INTRAVENOUS at 09:16

## 2017-06-28 RX ADMIN — FENTANYL CITRATE 100 MCG: 50 INJECTION INTRAMUSCULAR; INTRAVENOUS at 09:22

## 2017-06-28 RX ADMIN — ATENOLOL 100 MG: 100 TABLET ORAL at 21:37

## 2017-06-28 RX ADMIN — CEFAZOLIN SODIUM 2 G: 2 INJECTION, SOLUTION INTRAVENOUS at 09:24

## 2017-06-28 RX ADMIN — WARFARIN SODIUM 5 MG: 5 TABLET ORAL at 17:20

## 2017-06-28 RX ADMIN — NEOSTIGMINE METHYLSULFATE 2 MG: 1 INJECTION INTRAMUSCULAR; INTRAVENOUS; SUBCUTANEOUS at 10:15

## 2017-06-28 RX ADMIN — DEXAMETHASONE SODIUM PHOSPHATE 6 MG: 10 INJECTION INTRAMUSCULAR; INTRAVENOUS at 09:39

## 2017-06-28 RX ADMIN — FAMOTIDINE 20 MG: 10 INJECTION INTRAVENOUS at 08:53

## 2017-06-28 RX ADMIN — MIDAZOLAM 1 MG: 1 INJECTION INTRAMUSCULAR; INTRAVENOUS at 08:53

## 2017-06-28 RX ADMIN — ONDANSETRON 4 MG: 2 INJECTION INTRAMUSCULAR; INTRAVENOUS at 19:20

## 2017-06-28 RX ADMIN — EPINEPHRINE: 1 INJECTION PARENTERAL at 09:50

## 2017-06-28 RX ADMIN — CEFAZOLIN SODIUM 2 G: 2 INJECTION, SOLUTION INTRAVENOUS at 17:20

## 2017-06-28 RX ADMIN — ALLOPURINOL 300 MG: 300 TABLET ORAL at 21:37

## 2017-06-28 RX ADMIN — ATORVASTATIN CALCIUM 40 MG: 20 TABLET, FILM COATED ORAL at 21:37

## 2017-06-28 RX ADMIN — OXYCODONE HYDROCHLORIDE AND ACETAMINOPHEN 1 TABLET: 10; 325 TABLET ORAL at 15:29

## 2017-06-28 RX ADMIN — LIDOCAINE HYDROCHLORIDE 60 MG: 20 INJECTION, SOLUTION INFILTRATION; PERINEURAL at 09:16

## 2017-06-28 RX ADMIN — NEOSTIGMINE METHYLSULFATE 1 MG: 1 INJECTION INTRAMUSCULAR; INTRAVENOUS; SUBCUTANEOUS at 10:25

## 2017-06-28 RX ADMIN — FENTANYL CITRATE 50 MCG: 50 INJECTION INTRAMUSCULAR; INTRAVENOUS at 09:46

## 2017-06-28 RX ADMIN — OXYCODONE HYDROCHLORIDE AND ACETAMINOPHEN 1 TABLET: 10; 325 TABLET ORAL at 21:37

## 2017-06-28 RX ADMIN — METHYLPREDNISOLONE SODIUM SUCCINATE 100 MG: 125 INJECTION, POWDER, FOR SOLUTION INTRAMUSCULAR; INTRAVENOUS at 08:53

## 2017-06-28 RX ADMIN — ROCURONIUM BROMIDE 35 MG: 10 INJECTION INTRAVENOUS at 09:16

## 2017-06-28 RX ADMIN — GLYCOPYRROLATE 0.2 MG: 0.2 INJECTION INTRAMUSCULAR; INTRAVENOUS at 10:15

## 2017-06-28 RX ADMIN — SODIUM CHLORIDE, POTASSIUM CHLORIDE, SODIUM LACTATE AND CALCIUM CHLORIDE 9 ML/HR: 600; 310; 30; 20 INJECTION, SOLUTION INTRAVENOUS at 08:45

## 2017-06-28 RX ADMIN — ONDANSETRON 4 MG: 4 TABLET, FILM COATED ORAL at 19:38

## 2017-06-28 NOTE — ANESTHESIA PROCEDURE NOTES
Airway  Urgency: elective    Date/Time: 6/28/2017 9:18 AM  Airway not difficult    General Information and Staff    Patient location during procedure: OR  Anesthesiologist: WALDO CORTEZ  CRNA: SALINA TRENT    Indications and Patient Condition  Indications for airway management: airway protection    Preoxygenated: yes  Mask difficulty assessment: 1 - vent by mask    Final Airway Details  Final airway type: endotracheal airway      Successful airway: ETT  Cuffed: yes   Successful intubation technique: direct laryngoscopy  Endotracheal tube insertion site: oral  Blade: Melissa  Blade size: #3  ETT size: 7.0 mm  Cormack-Lehane Classification: grade I - full view of glottis  Placement verified by: chest auscultation and capnometry   Cuff volume (mL): 5  Measured from: lips  ETT to lips (cm): 20  Number of attempts at approach: 1    Additional Comments  Smooth IV induction. Trachea intubated. Cuff up. Ett secured. BEBS. Dentition intact without injury.

## 2017-06-28 NOTE — ANESTHESIA POSTPROCEDURE EVALUATION
Patient: Kinga Taylor    Procedure Summary     Date Anesthesia Start Anesthesia Stop Room / Location    06/28/17 0908 1033 [facility] OR 11 / [facility] OR       Procedure Diagnosis Surgeon Provider    LT TOTAL HIP ARTHROPLASTY REVISION (Left Hip) No diagnosis on file. MD Adelfo Brandon MD          Anesthesia Type: general  Last vitals  /76 (06/28/17 1115)    Temp      Pulse 54 (06/28/17 1115)   Resp 16 (06/28/17 1115)    SpO2 100 % (06/28/17 1115)      Post Anesthesia Care and Evaluation    Patient location during evaluation: PACU  Patient participation: complete - patient participated  Level of consciousness: awake and alert  Pain management: adequate  Airway patency: patent  Anesthetic complications: No anesthetic complications    Cardiovascular status: acceptable  Respiratory status: acceptable  Hydration status: acceptable

## 2017-06-28 NOTE — ANESTHESIA PREPROCEDURE EVALUATION
Anesthesia Evaluation     Patient summary reviewed and Nursing notes reviewed   no history of anesthetic complications:  NPO Solid Status: > 8 hours  NPO Liquid Status: > 2 hours     Airway   Mallampati: II  TM distance: >3 FB  Neck ROM: full  no difficulty expected  Dental - normal exam     Pulmonary - negative pulmonary ROS and normal exam   Cardiovascular - normal exam  Exercise tolerance: good (4-7 METS)    ECG reviewed  Rhythm: regular  Rate: normal    (+) hypertension well controlled, hyperlipidemia      Neuro/Psych  (+) tremors, psychiatric history Depression,    GI/Hepatic/Renal/Endo    (+) obesity,  hiatal hernia, GERD well controlled, renal disease CRI,     ROS Comment: S/p renal tx 22yrs ago, currently with mild CRI, Cr 1.5    Musculoskeletal (-) negative ROS    Abdominal  - normal exam   Substance History - negative use     OB/GYN negative ob/gyn ROS         Other - negative ROS                                       Anesthesia Plan    ASA 3     general     intravenous induction   Anesthetic plan and risks discussed with patient.    Plan discussed with CRNA and attending.

## 2017-06-29 VITALS
BODY MASS INDEX: 31.47 KG/M2 | RESPIRATION RATE: 16 BRPM | OXYGEN SATURATION: 96 % | HEART RATE: 75 BPM | HEIGHT: 63 IN | WEIGHT: 177.6 LBS | SYSTOLIC BLOOD PRESSURE: 101 MMHG | DIASTOLIC BLOOD PRESSURE: 66 MMHG | TEMPERATURE: 97.6 F

## 2017-06-29 LAB
BACTERIA SPEC AEROBE CULT: NO GROWTH
HCT VFR BLD AUTO: 28.9 % (ref 35.6–45.5)
HGB BLD-MCNC: 9.8 G/DL (ref 11.9–15.5)
INR PPP: 1.31 (ref 0.9–1.1)
PROTHROMBIN TIME: 15.8 SECONDS (ref 11.7–14.2)

## 2017-06-29 PROCEDURE — 85018 HEMOGLOBIN: CPT | Performed by: ORTHOPAEDIC SURGERY

## 2017-06-29 PROCEDURE — 25010000002 ENOXAPARIN PER 10 MG: Performed by: ORTHOPAEDIC SURGERY

## 2017-06-29 PROCEDURE — 97150 GROUP THERAPEUTIC PROCEDURES: CPT

## 2017-06-29 PROCEDURE — 85014 HEMATOCRIT: CPT | Performed by: ORTHOPAEDIC SURGERY

## 2017-06-29 PROCEDURE — 25010000003 CEFAZOLIN IN DEXTROSE 2-4 GM/100ML-% SOLUTION: Performed by: ORTHOPAEDIC SURGERY

## 2017-06-29 PROCEDURE — 97110 THERAPEUTIC EXERCISES: CPT

## 2017-06-29 PROCEDURE — 85610 PROTHROMBIN TIME: CPT | Performed by: ORTHOPAEDIC SURGERY

## 2017-06-29 RX ORDER — WARFARIN SODIUM 4 MG/1
4 TABLET ORAL
Qty: 40 TABLET
Start: 2017-06-29 | End: 2020-09-23

## 2017-06-29 RX ORDER — WARFARIN SODIUM 5 MG/1
5 TABLET ORAL
Status: COMPLETED | OUTPATIENT
Start: 2017-06-29 | End: 2017-06-29

## 2017-06-29 RX ORDER — OXYCODONE AND ACETAMINOPHEN 10; 325 MG/1; MG/1
1 TABLET ORAL EVERY 4 HOURS PRN
Qty: 56 TABLET | Refills: 0 | Status: SHIPPED | OUTPATIENT
Start: 2017-06-29 | End: 2017-07-08

## 2017-06-29 RX ADMIN — FERROUS SULFATE TAB 325 MG (65 MG ELEMENTAL FE) 325 MG: 325 (65 FE) TAB at 08:53

## 2017-06-29 RX ADMIN — SODIUM BICARBONATE 325 MG: 650 TABLET, ORALLY DISINTEGRATING ORAL at 08:53

## 2017-06-29 RX ADMIN — WARFARIN SODIUM 5 MG: 5 TABLET ORAL at 11:07

## 2017-06-29 RX ADMIN — SENNA AND DOCUSATE SODIUM 2 TABLET: 50; 8.6 TABLET, FILM COATED ORAL at 08:53

## 2017-06-29 RX ADMIN — ENOXAPARIN SODIUM 40 MG: 40 INJECTION SUBCUTANEOUS at 08:53

## 2017-06-29 RX ADMIN — OXYCODONE HYDROCHLORIDE AND ACETAMINOPHEN 1 TABLET: 10; 325 TABLET ORAL at 11:01

## 2017-06-29 RX ADMIN — GLIPIZIDE 5 MG: 5 TABLET ORAL at 06:36

## 2017-06-29 RX ADMIN — CEFAZOLIN SODIUM 2 G: 2 INJECTION, SOLUTION INTRAVENOUS at 03:34

## 2017-06-29 RX ADMIN — PANTOPRAZOLE SODIUM 40 MG: 40 TABLET, DELAYED RELEASE ORAL at 06:36

## 2017-06-29 RX ADMIN — OXYCODONE HYDROCHLORIDE 10 MG: 10 TABLET, FILM COATED, EXTENDED RELEASE ORAL at 08:53

## 2017-06-29 RX ADMIN — OXYCODONE HYDROCHLORIDE AND ACETAMINOPHEN 1 TABLET: 10; 325 TABLET ORAL at 02:01

## 2017-06-29 RX ADMIN — OXYCODONE HYDROCHLORIDE AND ACETAMINOPHEN 1 TABLET: 10; 325 TABLET ORAL at 06:36

## 2017-06-29 RX ADMIN — BUPROPION HYDROCHLORIDE 300 MG: 100 TABLET, FILM COATED ORAL at 08:53

## 2017-07-01 LAB
BACTERIA SPEC AEROBE CULT: NORMAL
GRAM STN SPEC: NORMAL

## 2017-07-03 LAB — BACTERIA SPEC ANAEROBE CULT: NORMAL

## 2017-07-21 ENCOUNTER — TRANSCRIBE ORDERS (OUTPATIENT)
Dept: ADMINISTRATIVE | Facility: HOSPITAL | Age: 47
End: 2017-07-21

## 2017-07-21 ENCOUNTER — LAB (OUTPATIENT)
Dept: LAB | Facility: HOSPITAL | Age: 47
End: 2017-07-21
Attending: ORTHOPAEDIC SURGERY

## 2017-07-21 DIAGNOSIS — Z79.01 LONG TERM (CURRENT) USE OF ANTICOAGULANTS: Primary | ICD-10-CM

## 2017-07-21 DIAGNOSIS — Z79.01 LONG TERM (CURRENT) USE OF ANTICOAGULANTS: ICD-10-CM

## 2017-07-21 LAB
INR PPP: 2.16 (ref 0.9–1.1)
PROTHROMBIN TIME: 23.3 SECONDS (ref 11.7–14.2)

## 2017-07-21 PROCEDURE — 85610 PROTHROMBIN TIME: CPT | Performed by: ORTHOPAEDIC SURGERY

## 2017-07-21 PROCEDURE — 36415 COLL VENOUS BLD VENIPUNCTURE: CPT

## 2017-07-24 ENCOUNTER — LAB (OUTPATIENT)
Dept: LAB | Facility: HOSPITAL | Age: 47
End: 2017-07-24
Attending: ORTHOPAEDIC SURGERY

## 2017-07-24 ENCOUNTER — TRANSCRIBE ORDERS (OUTPATIENT)
Dept: ADMINISTRATIVE | Facility: HOSPITAL | Age: 47
End: 2017-07-24

## 2017-07-24 DIAGNOSIS — Z79.01 LONG TERM (CURRENT) USE OF ANTICOAGULANTS: Primary | ICD-10-CM

## 2017-07-24 DIAGNOSIS — Z79.01 LONG TERM (CURRENT) USE OF ANTICOAGULANTS: ICD-10-CM

## 2017-07-24 LAB
INR PPP: 1.74 (ref 0.9–1.1)
PROTHROMBIN TIME: 19.8 SECONDS (ref 11.7–14.2)

## 2017-07-24 PROCEDURE — 85610 PROTHROMBIN TIME: CPT | Performed by: ORTHOPAEDIC SURGERY

## 2017-07-24 PROCEDURE — 36415 COLL VENOUS BLD VENIPUNCTURE: CPT

## 2017-07-26 NOTE — OP NOTE
PREOPERATIVE DIAGNOSIS: [Polyethylene wear left total hip.]    POSTOPERATIVE DIAGNOSIS: [Same.]    PROCEDURE PERFORMED: [Revision of acetabular liner and femoral head.]    ANESTHESIA:  [Gen.]    SURGEON:  Wil Curtis MD    ASSISTANT SURGEON: []    BLOOD LOSS: 100 cc.[]    SPECIMEN: Cultures.    []This is a-year-old lady with a  painful left total hip.  X-rays reveal poly-wear on the insert for the acetabular component.  She has had to have this exchange once in 2006.  She is brought to the hospital today to have the poly-exchange and a new femoral head position.    Patient brought to the holding room given appropriate IV antibiotics.  The operating room given a general anesthetic.  She was then placed in the decubitus position with the left side up.  The left hip was prepped and draped in a sterile fashion.  Previous skin incision was opened subcutaneous dissected away and the fascia split longitudinally.  Posterior approach was carried out to the hip.  This was done the patient had the pseudocapsule opened.  Clear fluid was encountered.  This fluid was cultured and the patient then had the hip dislocated posteriorly.  The head was removed from the femoral component.  The femoral component was well fixed.  Retracted the femur anteriorly.  And the polyethylene was removed from the acetabular component.  It showed evidence of marked wear.  This was a 26 mm inside diameter poly-for 46 Omnifit cup.  The new liner was opened and impacted into the cup.  We then opened and new head which was a +1026 head.  The hip was reduced and stability was appropriate all directions.  The periarticular injection was used and then the wound was irrigated with a dilute Betadine solution.  The Pseudocapsule was repaired with 0 Vicryl the fascia was repaired with 0 Ethibond and the subcutaneous was closed with 0 and 2-0 Vicryl.  Staples were used to close the skin.  Sterile dressing was applied abduction pillow positioned and her  general anesthetic was reversed.  Blood loss was approximately 100 cc thank you

## 2019-06-26 ENCOUNTER — TRANSCRIBE ORDERS (OUTPATIENT)
Dept: ADMINISTRATIVE | Facility: HOSPITAL | Age: 49
End: 2019-06-26

## 2019-06-26 DIAGNOSIS — D63.1 ANEMIA IN CHRONIC KIDNEY DISEASE (CODE): Primary | ICD-10-CM

## 2019-06-26 DIAGNOSIS — N18.30 CHRONIC RENAL DISEASE, STAGE III (HCC): ICD-10-CM

## 2019-06-27 PROBLEM — D63.1 ANEMIA DUE TO STAGE 3 CHRONIC KIDNEY DISEASE TREATED WITH ERYTHROPOIETIN (HCC): Status: ACTIVE | Noted: 2019-06-27

## 2019-06-27 PROBLEM — N18.30 ANEMIA DUE TO STAGE 3 CHRONIC KIDNEY DISEASE TREATED WITH ERYTHROPOIETIN (HCC): Status: ACTIVE | Noted: 2019-06-27

## 2019-07-01 ENCOUNTER — HOSPITAL ENCOUNTER (OUTPATIENT)
Dept: INFUSION THERAPY | Facility: HOSPITAL | Age: 49
Discharge: HOME | End: 2019-07-01
Admitting: INTERNAL MEDICINE

## 2019-07-01 VITALS
BODY MASS INDEX: 29.86 KG/M2 | SYSTOLIC BLOOD PRESSURE: 115 MMHG | HEART RATE: 75 BPM | WEIGHT: 168.5 LBS | HEIGHT: 63 IN | RESPIRATION RATE: 16 BRPM | TEMPERATURE: 97.7 F | OXYGEN SATURATION: 100 % | DIASTOLIC BLOOD PRESSURE: 73 MMHG

## 2019-07-01 DIAGNOSIS — D63.1 ANEMIA DUE TO STAGE 3 CHRONIC KIDNEY DISEASE TREATED WITH ERYTHROPOIETIN (HCC): Primary | ICD-10-CM

## 2019-07-01 DIAGNOSIS — N18.30 ANEMIA DUE TO STAGE 3 CHRONIC KIDNEY DISEASE TREATED WITH ERYTHROPOIETIN (HCC): Primary | ICD-10-CM

## 2019-07-01 PROCEDURE — 96365 THER/PROPH/DIAG IV INF INIT: CPT

## 2019-07-01 PROCEDURE — 25010000002 FERUMOXYTOL 510 MG/17ML SOLUTION 510 MG VIAL: Performed by: INTERNAL MEDICINE

## 2019-07-01 PROCEDURE — 96374 THER/PROPH/DIAG INJ IV PUSH: CPT

## 2019-07-01 RX ADMIN — FERUMOXYTOL 510 MG: 510 INJECTION INTRAVENOUS at 09:54

## 2019-07-01 NOTE — PATIENT INSTRUCTIONS
Ferumoxytol injection  What is this medicine?  FERUMOXYTOL is an iron complex. Iron is used to make healthy red blood cells, which carry oxygen and nutrients throughout the body. This medicine is used to treat iron deficiency anemia.  This medicine may be used for other purposes; ask your health care provider or pharmacist if you have questions.  COMMON BRAND NAME(S): Feraheme  What should I tell my health care provider before I take this medicine?  They need to know if you have any of these conditions:  -anemia not caused by low iron levels  -high levels of iron in the blood  -magnetic resonance imaging (MRI) test scheduled  -an unusual or allergic reaction to iron, other medicines, foods, dyes, or preservatives  -pregnant or trying to get pregnant  -breast-feeding  How should I use this medicine?  This medicine is for injection into a vein. It is given by a health care professional in a hospital or clinic setting.  Talk to your pediatrician regarding the use of this medicine in children. Special care may be needed.  Overdosage: If you think you have taken too much of this medicine contact a poison control center or emergency room at once.  NOTE: This medicine is only for you. Do not share this medicine with others.  What if I miss a dose?  It is important not to miss your dose. Call your doctor or health care professional if you are unable to keep an appointment.  What may interact with this medicine?  This medicine may interact with the following medications:  -other iron products  This list may not describe all possible interactions. Give your health care provider a list of all the medicines, herbs, non-prescription drugs, or dietary supplements you use. Also tell them if you smoke, drink alcohol, or use illegal drugs. Some items may interact with your medicine.  What should I watch for while using this medicine?  Visit your doctor or healthcare professional regularly. Tell your doctor or healthcare professional  if your symptoms do not start to get better or if they get worse. You may need blood work done while you are taking this medicine.  You may need to follow a special diet. Talk to your doctor. Foods that contain iron include: whole grains/cereals, dried fruits, beans, or peas, leafy green vegetables, and organ meats (liver, kidney).  What side effects may I notice from receiving this medicine?  Side effects that you should report to your doctor or health care professional as soon as possible:  -allergic reactions like skin rash, itching or hives, swelling of the face, lips, or tongue  -breathing problems  -changes in blood pressure  -feeling faint or lightheaded, falls  -fever or chills  -flushing, sweating, or hot feelings  -swelling of the ankles or feet  Side effects that usually do not require medical attention (report to your doctor or health care professional if they continue or are bothersome):  -diarrhea  -headache  -nausea, vomiting  -stomach pain  This list may not describe all possible side effects. Call your doctor for medical advice about side effects. You may report side effects to FDA at 2-632-FDA-1068.  Where should I keep my medicine?  This drug is given in a hospital or clinic and will not be stored at home.  NOTE: This sheet is a summary. It may not cover all possible information. If you have questions about this medicine, talk to your doctor, pharmacist, or health care provider.  © 2019 Elsevier/Gold Standard (2018-02-05 20:21:10)

## 2019-07-15 ENCOUNTER — HOSPITAL ENCOUNTER (OUTPATIENT)
Dept: INFUSION THERAPY | Facility: HOSPITAL | Age: 49
Discharge: HOME | End: 2019-07-15
Admitting: INTERNAL MEDICINE

## 2019-07-15 VITALS
RESPIRATION RATE: 16 BRPM | SYSTOLIC BLOOD PRESSURE: 122 MMHG | DIASTOLIC BLOOD PRESSURE: 84 MMHG | TEMPERATURE: 97.5 F | OXYGEN SATURATION: 100 % | HEART RATE: 74 BPM

## 2019-07-15 DIAGNOSIS — N18.30 ANEMIA DUE TO STAGE 3 CHRONIC KIDNEY DISEASE TREATED WITH ERYTHROPOIETIN (HCC): Primary | ICD-10-CM

## 2019-07-15 DIAGNOSIS — D63.1 ANEMIA DUE TO STAGE 3 CHRONIC KIDNEY DISEASE TREATED WITH ERYTHROPOIETIN (HCC): Primary | ICD-10-CM

## 2019-07-15 PROCEDURE — 25010000002 FERUMOXYTOL 510 MG/17ML SOLUTION 510 MG VIAL: Performed by: INTERNAL MEDICINE

## 2019-07-15 PROCEDURE — 96374 THER/PROPH/DIAG INJ IV PUSH: CPT

## 2019-07-15 PROCEDURE — 96365 THER/PROPH/DIAG IV INF INIT: CPT

## 2019-07-15 RX ADMIN — FERUMOXYTOL 510 MG: 510 INJECTION INTRAVENOUS at 08:37

## 2019-07-15 NOTE — PATIENT INSTRUCTIONS
Ferumoxytol injection  What is this medicine?  FERUMOXYTOL is an iron complex. Iron is used to make healthy red blood cells, which carry oxygen and nutrients throughout the body. This medicine is used to treat iron deficiency anemia.  This medicine may be used for other purposes; ask your health care provider or pharmacist if you have questions.  COMMON BRAND NAME(S): Feraheme  What should I tell my health care provider before I take this medicine?  They need to know if you have any of these conditions:  -anemia not caused by low iron levels  -high levels of iron in the blood  -magnetic resonance imaging (MRI) test scheduled  -an unusual or allergic reaction to iron, other medicines, foods, dyes, or preservatives  -pregnant or trying to get pregnant  -breast-feeding  How should I use this medicine?  This medicine is for injection into a vein. It is given by a health care professional in a hospital or clinic setting.  Talk to your pediatrician regarding the use of this medicine in children. Special care may be needed.  Overdosage: If you think you have taken too much of this medicine contact a poison control center or emergency room at once.  NOTE: This medicine is only for you. Do not share this medicine with others.  What if I miss a dose?  It is important not to miss your dose. Call your doctor or health care professional if you are unable to keep an appointment.  What may interact with this medicine?  This medicine may interact with the following medications:  -other iron products  This list may not describe all possible interactions. Give your health care provider a list of all the medicines, herbs, non-prescription drugs, or dietary supplements you use. Also tell them if you smoke, drink alcohol, or use illegal drugs. Some items may interact with your medicine.  What should I watch for while using this medicine?  Visit your doctor or healthcare professional regularly. Tell your doctor or healthcare professional  if your symptoms do not start to get better or if they get worse. You may need blood work done while you are taking this medicine.  You may need to follow a special diet. Talk to your doctor. Foods that contain iron include: whole grains/cereals, dried fruits, beans, or peas, leafy green vegetables, and organ meats (liver, kidney).  What side effects may I notice from receiving this medicine?  Side effects that you should report to your doctor or health care professional as soon as possible:  -allergic reactions like skin rash, itching or hives, swelling of the face, lips, or tongue  -breathing problems  -changes in blood pressure  -feeling faint or lightheaded, falls  -fever or chills  -flushing, sweating, or hot feelings  -swelling of the ankles or feet  Side effects that usually do not require medical attention (report to your doctor or health care professional if they continue or are bothersome):  -diarrhea  -headache  -nausea, vomiting  -stomach pain  This list may not describe all possible side effects. Call your doctor for medical advice about side effects. You may report side effects to FDA at 9-717-FDA-1492.  Where should I keep my medicine?  This drug is given in a hospital or clinic and will not be stored at home.  NOTE: This sheet is a summary. It may not cover all possible information. If you have questions about this medicine, talk to your doctor, pharmacist, or health care provider.  © 2019 Elsevier/Gold Standard (2018-02-05 20:21:10)

## 2019-07-15 NOTE — PROGRESS NOTES
Patient tolerated infusion without complaint. Patient discharged 30 minutes after completion of infusion with AVS at 0930.

## 2020-06-01 ENCOUNTER — TRANSCRIBE ORDERS (OUTPATIENT)
Dept: ADMINISTRATIVE | Facility: HOSPITAL | Age: 50
End: 2020-06-01

## 2020-06-01 DIAGNOSIS — Z94.0 KIDNEY TRANSPLANTED: ICD-10-CM

## 2020-06-01 DIAGNOSIS — I10 HYPERTENSION, UNSPECIFIED TYPE: Primary | ICD-10-CM

## 2020-06-09 ENCOUNTER — HOSPITAL ENCOUNTER (OUTPATIENT)
Dept: ULTRASOUND IMAGING | Facility: HOSPITAL | Age: 50
Discharge: HOME | End: 2020-06-09

## 2020-06-09 ENCOUNTER — HOSPITAL ENCOUNTER (OUTPATIENT)
Dept: CARDIOLOGY | Facility: HOSPITAL | Age: 50
Discharge: HOME | End: 2020-06-09
Admitting: INTERNAL MEDICINE

## 2020-06-09 DIAGNOSIS — Z94.0 KIDNEY TRANSPLANTED: ICD-10-CM

## 2020-06-09 DIAGNOSIS — I10 HYPERTENSION, UNSPECIFIED TYPE: ICD-10-CM

## 2020-06-09 LAB
BH CV VAS BP RIGHT ARM: NORMAL MMHG
BH CV VAS RENAL HILUM RIGHT EDV: 3 CM/S
BH CV VAS RENAL HILUM RIGHT PSV: 30 CM/S
BH CV XLRA MEAS DIST REN A EDV RIGHT: 15.5 CM/SEC
BH CV XLRA MEAS DIST REN A PSV RIGHT: 73.4 CM/SEC
BH CV XLRA MEAS DIST REN A RI RIGHT: 0.79
BH CV XLRA MEAS KID L RIGHT: 14.5 CM
BH CV XLRA MEAS KID W RIGHT: 6.79 CM
BH CV XLRA MEAS MID REN A EDV RIGHT: 17.4 CM/SEC
BH CV XLRA MEAS MID REN A PSV RIGHT: 84.6 CM/SEC
BH CV XLRA MEAS MID REN A RI RIGHT: 0.79
BH CV XLRA MEAS PROX REN A EDV RIGHT: 18.3 CM/SEC
BH CV XLRA MEAS PROX REN A PSV RIGHT: 75.5 CM/SEC
BH CV XLRA MEAS PROX REN A RI RIGHT: 0.76
BH CV XLRA MEAS RENAL A ORG EDV RIGHT: 18.9 CM/SEC
BH CV XLRA MEAS RENAL A ORG PSV RIGHT: 109 CM/SEC
BH CV XLRA MEAS RENAL A ORG RI RIGHT: 0.83
RIGHT RENAL UPPER PARENCHYMA MAX: 18 CM/S
RIGHT RENAL UPPER PARENCHYMA MIN: 5 CM/S
RIGHT RENAL UPPER PARENCHYMA RI: 0.72

## 2020-06-09 PROCEDURE — 93975 VASCULAR STUDY: CPT

## 2020-06-09 PROCEDURE — 93976 VASCULAR STUDY: CPT

## 2020-06-09 PROCEDURE — 76775 US EXAM ABDO BACK WALL LIM: CPT

## 2020-09-16 ENCOUNTER — TRANSCRIBE ORDERS (OUTPATIENT)
Dept: ADMINISTRATIVE | Facility: HOSPITAL | Age: 50
End: 2020-09-16

## 2020-09-16 DIAGNOSIS — Z76.82 KIDNEY TRANSPLANT CANDIDATE: Primary | ICD-10-CM

## 2020-09-23 ENCOUNTER — APPOINTMENT (OUTPATIENT)
Dept: WOMENS IMAGING | Facility: HOSPITAL | Age: 50
End: 2020-09-23

## 2020-09-23 ENCOUNTER — APPOINTMENT (OUTPATIENT)
Dept: CT IMAGING | Facility: HOSPITAL | Age: 50
End: 2020-09-23

## 2020-09-23 PROCEDURE — 77063 BREAST TOMOSYNTHESIS BI: CPT | Performed by: RADIOLOGY

## 2020-09-23 PROCEDURE — 77067 SCR MAMMO BI INCL CAD: CPT | Performed by: RADIOLOGY

## 2020-09-29 ENCOUNTER — HOSPITAL ENCOUNTER (OUTPATIENT)
Dept: CT IMAGING | Facility: HOSPITAL | Age: 50
Discharge: HOME | End: 2020-09-29

## 2020-09-29 ENCOUNTER — HOSPITAL ENCOUNTER (OUTPATIENT)
Dept: ULTRASOUND IMAGING | Facility: HOSPITAL | Age: 50
Discharge: HOME | End: 2020-09-29

## 2020-09-29 VITALS
OXYGEN SATURATION: 99 % | HEART RATE: 82 BPM | WEIGHT: 147 LBS | BODY MASS INDEX: 26.05 KG/M2 | RESPIRATION RATE: 16 BRPM | HEIGHT: 63 IN | SYSTOLIC BLOOD PRESSURE: 137 MMHG | DIASTOLIC BLOOD PRESSURE: 81 MMHG | TEMPERATURE: 96.9 F

## 2020-09-29 DIAGNOSIS — Z76.82 KIDNEY TRANSPLANT CANDIDATE: ICD-10-CM

## 2020-09-29 LAB
INR PPP: 1.1 (ref 0.8–1.2)
PLATELET # BLD AUTO: 195 10*3/MM3 (ref 140–450)
PROTHROMBIN TIME: 12.6 SECONDS (ref 12.8–15.2)

## 2020-09-29 PROCEDURE — 88300 SURGICAL PATH GROSS: CPT | Performed by: INTERNAL MEDICINE

## 2020-09-29 PROCEDURE — 85049 AUTOMATED PLATELET COUNT: CPT | Performed by: INTERNAL MEDICINE

## 2020-09-29 PROCEDURE — 76942 ECHO GUIDE FOR BIOPSY: CPT

## 2020-09-29 PROCEDURE — 25010000003 LIDOCAINE 1 % SOLUTION: Performed by: RADIOLOGY

## 2020-09-29 PROCEDURE — 85610 PROTHROMBIN TIME: CPT

## 2020-09-29 RX ORDER — SODIUM CHLORIDE 0.9 % (FLUSH) 0.9 %
3 SYRINGE (ML) INJECTION EVERY 12 HOURS SCHEDULED
Status: DISCONTINUED | OUTPATIENT
Start: 2020-09-29 | End: 2020-09-30 | Stop reason: HOSPADM

## 2020-09-29 RX ORDER — HYDROXYZINE PAMOATE 25 MG/1
50 CAPSULE ORAL
COMMUNITY
End: 2020-11-19

## 2020-09-29 RX ORDER — ALPRAZOLAM 0.5 MG/1
0.5 TABLET ORAL ONCE
Status: COMPLETED | OUTPATIENT
Start: 2020-09-29 | End: 2020-09-29

## 2020-09-29 RX ORDER — LIDOCAINE HYDROCHLORIDE 10 MG/ML
10 INJECTION, SOLUTION INFILTRATION; PERINEURAL ONCE
Status: COMPLETED | OUTPATIENT
Start: 2020-09-29 | End: 2020-09-29

## 2020-09-29 RX ORDER — SIROLIMUS 2 MG/1
2 TABLET, FILM COATED ORAL DAILY
COMMUNITY

## 2020-09-29 RX ORDER — SODIUM CHLORIDE 0.9 % (FLUSH) 0.9 %
1-10 SYRINGE (ML) INJECTION AS NEEDED
Status: DISCONTINUED | OUTPATIENT
Start: 2020-09-29 | End: 2020-09-30 | Stop reason: HOSPADM

## 2020-09-29 RX ADMIN — LIDOCAINE HYDROCHLORIDE 9 ML: 10 INJECTION, SOLUTION INFILTRATION; PERINEURAL at 12:47

## 2020-09-29 RX ADMIN — ALPRAZOLAM 0.5 MG: 0.5 TABLET ORAL at 11:40

## 2020-09-30 ENCOUNTER — TELEPHONE (OUTPATIENT)
Dept: INTERVENTIONAL RADIOLOGY/VASCULAR | Facility: HOSPITAL | Age: 50
End: 2020-09-30

## 2020-10-02 LAB
CYTO UR: NORMAL
LAB AP CASE REPORT: NORMAL
PATH REPORT.FINAL DX SPEC: NORMAL
PATH REPORT.GROSS SPEC: NORMAL

## 2020-11-03 ENCOUNTER — APPOINTMENT (OUTPATIENT)
Dept: WOMENS IMAGING | Facility: HOSPITAL | Age: 50
End: 2020-11-03

## 2020-11-03 PROCEDURE — 77062 BREAST TOMOSYNTHESIS BI: CPT | Performed by: RADIOLOGY

## 2020-11-03 PROCEDURE — G0279 TOMOSYNTHESIS, MAMMO: HCPCS | Performed by: RADIOLOGY

## 2020-11-03 PROCEDURE — 77066 DX MAMMO INCL CAD BI: CPT | Performed by: RADIOLOGY

## 2020-11-03 PROCEDURE — 76641 ULTRASOUND BREAST COMPLETE: CPT | Performed by: RADIOLOGY

## 2020-11-19 ENCOUNTER — APPOINTMENT (OUTPATIENT)
Dept: GENERAL RADIOLOGY | Facility: HOSPITAL | Age: 50
End: 2020-11-19

## 2020-11-19 ENCOUNTER — HOSPITAL ENCOUNTER (INPATIENT)
Facility: HOSPITAL | Age: 50
LOS: 3 days | Discharge: HOME | End: 2020-11-22
Attending: EMERGENCY MEDICINE | Admitting: HOSPITALIST

## 2020-11-19 DIAGNOSIS — N17.9 ACUTE KIDNEY INJURY (HCC): ICD-10-CM

## 2020-11-19 DIAGNOSIS — A41.9 SEPSIS WITH ACUTE RENAL FAILURE WITHOUT SEPTIC SHOCK, DUE TO UNSPECIFIED ORGANISM, UNSPECIFIED ACUTE RENAL FAILURE TYPE (HCC): Primary | ICD-10-CM

## 2020-11-19 DIAGNOSIS — R65.20 SEPSIS WITH ACUTE RENAL FAILURE WITHOUT SEPTIC SHOCK, DUE TO UNSPECIFIED ORGANISM, UNSPECIFIED ACUTE RENAL FAILURE TYPE (HCC): Primary | ICD-10-CM

## 2020-11-19 DIAGNOSIS — N17.9 SEPSIS WITH ACUTE RENAL FAILURE WITHOUT SEPTIC SHOCK, DUE TO UNSPECIFIED ORGANISM, UNSPECIFIED ACUTE RENAL FAILURE TYPE (HCC): Primary | ICD-10-CM

## 2020-11-19 DIAGNOSIS — E16.2 HYPOGLYCEMIA: ICD-10-CM

## 2020-11-19 LAB
ALBUMIN SERPL-MCNC: 3.5 G/DL (ref 3.5–5.2)
ALBUMIN/GLOB SERPL: 0.8 G/DL
ALP SERPL-CCNC: 112 U/L (ref 39–117)
ALT SERPL W P-5'-P-CCNC: 11 U/L (ref 1–33)
ANION GAP SERPL CALCULATED.3IONS-SCNC: 13.4 MMOL/L (ref 5–15)
AST SERPL-CCNC: 28 U/L (ref 1–32)
B PARAPERT DNA SPEC QL NAA+PROBE: NOT DETECTED
B PERT DNA SPEC QL NAA+PROBE: NOT DETECTED
BACTERIA UR QL AUTO: ABNORMAL /HPF
BASOPHILS # BLD AUTO: 0.02 10*3/MM3 (ref 0–0.2)
BASOPHILS NFR BLD AUTO: 0.3 % (ref 0–1.5)
BILIRUB SERPL-MCNC: 0.4 MG/DL (ref 0–1.2)
BILIRUB UR QL STRIP: NEGATIVE
BUN SERPL-MCNC: 34 MG/DL (ref 6–20)
BUN/CREAT SERPL: 8.1 (ref 7–25)
C PNEUM DNA NPH QL NAA+NON-PROBE: NOT DETECTED
CALCIUM SPEC-SCNC: 9.6 MG/DL (ref 8.6–10.5)
CHLORIDE SERPL-SCNC: 94 MMOL/L (ref 98–107)
CLARITY UR: ABNORMAL
CO2 SERPL-SCNC: 24.6 MMOL/L (ref 22–29)
COLOR UR: YELLOW
CREAT SERPL-MCNC: 4.2 MG/DL (ref 0.57–1)
D-LACTATE SERPL-SCNC: 1.7 MMOL/L (ref 0.5–2)
DEPRECATED RDW RBC AUTO: 45.1 FL (ref 37–54)
EOSINOPHIL # BLD AUTO: 0.06 10*3/MM3 (ref 0–0.4)
EOSINOPHIL NFR BLD AUTO: 0.8 % (ref 0.3–6.2)
ERYTHROCYTE [DISTWIDTH] IN BLOOD BY AUTOMATED COUNT: 15.6 % (ref 12.3–15.4)
FLUAV SUBTYP SPEC NAA+PROBE: NOT DETECTED
FLUBV RNA ISLT QL NAA+PROBE: NOT DETECTED
GFR SERPL CREATININE-BSD FRML MDRD: 11 ML/MIN/1.73
GFR SERPL CREATININE-BSD FRML MDRD: ABNORMAL ML/MIN/{1.73_M2}
GLOBULIN UR ELPH-MCNC: 4.6 GM/DL
GLUCOSE BLDC GLUCOMTR-MCNC: 105 MG/DL (ref 70–130)
GLUCOSE BLDC GLUCOMTR-MCNC: 166 MG/DL (ref 70–130)
GLUCOSE BLDC GLUCOMTR-MCNC: 194 MG/DL (ref 70–130)
GLUCOSE BLDC GLUCOMTR-MCNC: 30 MG/DL (ref 70–130)
GLUCOSE BLDC GLUCOMTR-MCNC: 59 MG/DL (ref 70–130)
GLUCOSE BLDC GLUCOMTR-MCNC: 96 MG/DL (ref 70–130)
GLUCOSE BLDC GLUCOMTR-MCNC: 98 MG/DL (ref 70–130)
GLUCOSE SERPL-MCNC: 38 MG/DL (ref 65–99)
GLUCOSE UR STRIP-MCNC: NEGATIVE MG/DL
HADV DNA SPEC NAA+PROBE: NOT DETECTED
HCG SERPL QL: NEGATIVE
HCOV 229E RNA SPEC QL NAA+PROBE: NOT DETECTED
HCOV HKU1 RNA SPEC QL NAA+PROBE: NOT DETECTED
HCOV NL63 RNA SPEC QL NAA+PROBE: NOT DETECTED
HCOV OC43 RNA SPEC QL NAA+PROBE: NOT DETECTED
HCT VFR BLD AUTO: 27.4 % (ref 34–46.6)
HGB BLD-MCNC: 8.9 G/DL (ref 12–15.9)
HGB UR QL STRIP.AUTO: ABNORMAL
HMPV RNA NPH QL NAA+NON-PROBE: NOT DETECTED
HOLD SPECIMEN: NORMAL
HOLD SPECIMEN: NORMAL
HPIV1 RNA SPEC QL NAA+PROBE: NOT DETECTED
HPIV2 RNA SPEC QL NAA+PROBE: NOT DETECTED
HPIV3 RNA NPH QL NAA+PROBE: NOT DETECTED
HPIV4 P GENE NPH QL NAA+PROBE: NOT DETECTED
HYALINE CASTS UR QL AUTO: ABNORMAL /LPF
KETONES UR QL STRIP: NEGATIVE
LEUKOCYTE ESTERASE UR QL STRIP.AUTO: ABNORMAL
LYMPHOCYTES # BLD AUTO: 1.57 10*3/MM3 (ref 0.7–3.1)
LYMPHOCYTES NFR BLD AUTO: 21.3 % (ref 19.6–45.3)
M PNEUMO IGG SER IA-ACNC: NOT DETECTED
MCH RBC QN AUTO: 26.3 PG (ref 26.6–33)
MCHC RBC AUTO-ENTMCNC: 32.5 G/DL (ref 31.5–35.7)
MCV RBC AUTO: 81.1 FL (ref 79–97)
MONOCYTES # BLD AUTO: 0.64 10*3/MM3 (ref 0.1–0.9)
MONOCYTES NFR BLD AUTO: 8.7 % (ref 5–12)
NEUTROPHILS NFR BLD AUTO: 4.99 10*3/MM3 (ref 1.7–7)
NEUTROPHILS NFR BLD AUTO: 67.8 % (ref 42.7–76)
NITRITE UR QL STRIP: NEGATIVE
OVALOCYTES BLD QL SMEAR: NORMAL
PH UR STRIP.AUTO: 7.5 [PH] (ref 5–8)
PLAT MORPH BLD: NORMAL
PLATELET # BLD AUTO: 330 10*3/MM3 (ref 140–450)
PMV BLD AUTO: 8.8 FL (ref 6–12)
POTASSIUM SERPL-SCNC: 3.4 MMOL/L (ref 3.5–5.2)
PROCALCITONIN SERPL-MCNC: 0.35 NG/ML (ref 0–0.25)
PROT SERPL-MCNC: 8.1 G/DL (ref 6–8.5)
PROT UR QL STRIP: ABNORMAL
RBC # BLD AUTO: 3.38 10*6/MM3 (ref 3.77–5.28)
RBC # UR: ABNORMAL /HPF
REF LAB TEST METHOD: ABNORMAL
RHINOVIRUS RNA SPEC NAA+PROBE: NOT DETECTED
RSV RNA NPH QL NAA+NON-PROBE: NOT DETECTED
SARS-COV-2 RNA NPH QL NAA+NON-PROBE: NOT DETECTED
SODIUM SERPL-SCNC: 132 MMOL/L (ref 136–145)
SP GR UR STRIP: 1.01 (ref 1–1.03)
SPHEROCYTES BLD QL SMEAR: NORMAL
SQUAMOUS #/AREA URNS HPF: ABNORMAL /HPF
UROBILINOGEN UR QL STRIP: ABNORMAL
WBC # BLD AUTO: 7.36 10*3/MM3 (ref 3.4–10.8)
WBC MORPH BLD: NORMAL
WBC UR QL AUTO: ABNORMAL /HPF
WHOLE BLOOD HOLD SPECIMEN: NORMAL
WHOLE BLOOD HOLD SPECIMEN: NORMAL

## 2020-11-19 PROCEDURE — 85007 BL SMEAR W/DIFF WBC COUNT: CPT | Performed by: NURSE PRACTITIONER

## 2020-11-19 PROCEDURE — 85025 COMPLETE CBC W/AUTO DIFF WBC: CPT | Performed by: NURSE PRACTITIONER

## 2020-11-19 PROCEDURE — 87040 BLOOD CULTURE FOR BACTERIA: CPT | Performed by: INTERNAL MEDICINE

## 2020-11-19 PROCEDURE — 63710000001 PREDNISONE PER 5 MG: Performed by: HOSPITALIST

## 2020-11-19 PROCEDURE — 82962 GLUCOSE BLOOD TEST: CPT

## 2020-11-19 PROCEDURE — 83605 ASSAY OF LACTIC ACID: CPT | Performed by: NURSE PRACTITIONER

## 2020-11-19 PROCEDURE — 87086 URINE CULTURE/COLONY COUNT: CPT | Performed by: INTERNAL MEDICINE

## 2020-11-19 PROCEDURE — 81001 URINALYSIS AUTO W/SCOPE: CPT | Performed by: NURSE PRACTITIONER

## 2020-11-19 PROCEDURE — 25010000002 ONDANSETRON PER 1 MG: Performed by: HOSPITALIST

## 2020-11-19 PROCEDURE — 99284 EMERGENCY DEPT VISIT MOD MDM: CPT

## 2020-11-19 PROCEDURE — 25010000002 ONDANSETRON PER 1 MG

## 2020-11-19 PROCEDURE — 84703 CHORIONIC GONADOTROPIN ASSAY: CPT | Performed by: NURSE PRACTITIONER

## 2020-11-19 PROCEDURE — 80053 COMPREHEN METABOLIC PANEL: CPT | Performed by: NURSE PRACTITIONER

## 2020-11-19 PROCEDURE — 84145 PROCALCITONIN (PCT): CPT | Performed by: NURSE PRACTITIONER

## 2020-11-19 PROCEDURE — 25010000002 VANCOMYCIN 10 G RECONSTITUTED SOLUTION: Performed by: NURSE PRACTITIONER

## 2020-11-19 PROCEDURE — 63710000001 SIROLIMUS 0.5 MG TABLET: Performed by: HOSPITALIST

## 2020-11-19 PROCEDURE — 87040 BLOOD CULTURE FOR BACTERIA: CPT | Performed by: NURSE PRACTITIONER

## 2020-11-19 PROCEDURE — 25010000002 METHYLPREDNISOLONE PER 40 MG: Performed by: NURSE PRACTITIONER

## 2020-11-19 PROCEDURE — 25010000002 CEFEPIME PER 500 MG: Performed by: NURSE PRACTITIONER

## 2020-11-19 PROCEDURE — 71045 X-RAY EXAM CHEST 1 VIEW: CPT

## 2020-11-19 PROCEDURE — 36415 COLL VENOUS BLD VENIPUNCTURE: CPT | Performed by: INTERNAL MEDICINE

## 2020-11-19 PROCEDURE — 0202U NFCT DS 22 TRGT SARS-COV-2: CPT | Performed by: NURSE PRACTITIONER

## 2020-11-19 RX ORDER — FAMOTIDINE 20 MG/1
20 TABLET, FILM COATED ORAL 2 TIMES DAILY
Status: DISCONTINUED | OUTPATIENT
Start: 2020-11-20 | End: 2020-11-22 | Stop reason: HOSPADM

## 2020-11-19 RX ORDER — METHYLPREDNISOLONE SODIUM SUCCINATE 40 MG/ML
40 INJECTION, POWDER, LYOPHILIZED, FOR SOLUTION INTRAMUSCULAR; INTRAVENOUS DAILY
Status: DISCONTINUED | OUTPATIENT
Start: 2020-11-20 | End: 2020-11-19

## 2020-11-19 RX ORDER — ALLOPURINOL 300 MG/1
300 TABLET ORAL NIGHTLY
Status: DISCONTINUED | OUTPATIENT
Start: 2020-11-19 | End: 2020-11-19

## 2020-11-19 RX ORDER — ALENDRONATE SODIUM 70 MG/1
70 TABLET ORAL
COMMUNITY

## 2020-11-19 RX ORDER — PARICALCITOL 1 UG/1
2 CAPSULE, LIQUID FILLED ORAL NIGHTLY
Status: DISCONTINUED | OUTPATIENT
Start: 2020-11-19 | End: 2020-11-22 | Stop reason: HOSPADM

## 2020-11-19 RX ORDER — SODIUM CHLORIDE AND POTASSIUM CHLORIDE 150; 900 MG/100ML; MG/100ML
75 INJECTION, SOLUTION INTRAVENOUS CONTINUOUS
Status: DISCONTINUED | OUTPATIENT
Start: 2020-11-19 | End: 2020-11-19

## 2020-11-19 RX ORDER — BUPROPION HYDROCHLORIDE 300 MG/1
300 TABLET ORAL DAILY
COMMUNITY

## 2020-11-19 RX ORDER — DEXTROSE MONOHYDRATE 25 G/50ML
25 INJECTION, SOLUTION INTRAVENOUS ONCE
Status: DISCONTINUED | OUTPATIENT
Start: 2020-11-19 | End: 2020-11-19

## 2020-11-19 RX ORDER — PREDNISONE 1 MG/1
5 TABLET ORAL NIGHTLY
Status: DISCONTINUED | OUTPATIENT
Start: 2020-11-19 | End: 2020-11-22 | Stop reason: HOSPADM

## 2020-11-19 RX ORDER — SODIUM CHLORIDE 0.9 % (FLUSH) 0.9 %
10 SYRINGE (ML) INJECTION AS NEEDED
Status: DISCONTINUED | OUTPATIENT
Start: 2020-11-19 | End: 2020-11-22 | Stop reason: HOSPADM

## 2020-11-19 RX ORDER — ATENOLOL 25 MG/1
25 TABLET ORAL
Status: DISCONTINUED | OUTPATIENT
Start: 2020-11-19 | End: 2020-11-19

## 2020-11-19 RX ORDER — ACETAMINOPHEN 325 MG/1
650 TABLET ORAL EVERY 4 HOURS PRN
Status: DISCONTINUED | OUTPATIENT
Start: 2020-11-19 | End: 2020-11-22 | Stop reason: HOSPADM

## 2020-11-19 RX ORDER — FAMOTIDINE 20 MG/1
20 TABLET, FILM COATED ORAL DAILY
Status: DISCONTINUED | OUTPATIENT
Start: 2020-11-19 | End: 2020-11-19

## 2020-11-19 RX ORDER — SODIUM CHLORIDE 9 MG/ML
125 INJECTION, SOLUTION INTRAVENOUS CONTINUOUS
Status: DISCONTINUED | OUTPATIENT
Start: 2020-11-19 | End: 2020-11-19

## 2020-11-19 RX ORDER — ACETAMINOPHEN 500 MG
1000 TABLET ORAL ONCE
Status: DISCONTINUED | OUTPATIENT
Start: 2020-11-19 | End: 2020-11-19

## 2020-11-19 RX ORDER — DEXTROSE AND SODIUM CHLORIDE 5; .9 G/100ML; G/100ML
75 INJECTION, SOLUTION INTRAVENOUS CONTINUOUS
Status: DISCONTINUED | OUTPATIENT
Start: 2020-11-19 | End: 2020-11-19

## 2020-11-19 RX ORDER — ONDANSETRON 4 MG/1
4 TABLET, ORALLY DISINTEGRATING ORAL EVERY 6 HOURS PRN
Status: DISCONTINUED | OUTPATIENT
Start: 2020-11-19 | End: 2020-11-22 | Stop reason: HOSPADM

## 2020-11-19 RX ORDER — DEXTROSE MONOHYDRATE 25 G/50ML
25 INJECTION, SOLUTION INTRAVENOUS ONCE
Status: COMPLETED | OUTPATIENT
Start: 2020-11-19 | End: 2020-11-19

## 2020-11-19 RX ORDER — SIROLIMUS 0.5 MG/1
2 TABLET, FILM COATED ORAL DAILY
Status: DISCONTINUED | OUTPATIENT
Start: 2020-11-19 | End: 2020-11-22 | Stop reason: HOSPADM

## 2020-11-19 RX ORDER — POTASSIUM CHLORIDE 750 MG/1
20 TABLET, FILM COATED, EXTENDED RELEASE ORAL ONCE
Status: DISCONTINUED | OUTPATIENT
Start: 2020-11-19 | End: 2020-11-19

## 2020-11-19 RX ORDER — ALLOPURINOL 100 MG/1
100 TABLET ORAL NIGHTLY
Status: DISCONTINUED | OUTPATIENT
Start: 2020-11-19 | End: 2020-11-22 | Stop reason: HOSPADM

## 2020-11-19 RX ORDER — ONDANSETRON 2 MG/ML
4 INJECTION INTRAMUSCULAR; INTRAVENOUS ONCE
Status: COMPLETED | OUTPATIENT
Start: 2020-11-19 | End: 2020-11-19

## 2020-11-19 RX ORDER — BUPROPION HYDROCHLORIDE 300 MG/1
300 TABLET ORAL DAILY
Status: DISCONTINUED | OUTPATIENT
Start: 2020-11-19 | End: 2020-11-22 | Stop reason: HOSPADM

## 2020-11-19 RX ORDER — DEXTROSE AND SODIUM CHLORIDE 5; .9 G/100ML; G/100ML
75 INJECTION, SOLUTION INTRAVENOUS CONTINUOUS
Status: DISCONTINUED | OUTPATIENT
Start: 2020-11-19 | End: 2020-11-20

## 2020-11-19 RX ORDER — ONDANSETRON 2 MG/ML
INJECTION INTRAMUSCULAR; INTRAVENOUS
Status: COMPLETED
Start: 2020-11-19 | End: 2020-11-19

## 2020-11-19 RX ORDER — METHYLPREDNISOLONE SODIUM SUCCINATE 40 MG/ML
40 INJECTION, POWDER, LYOPHILIZED, FOR SOLUTION INTRAMUSCULAR; INTRAVENOUS ONCE
Status: DISCONTINUED | OUTPATIENT
Start: 2020-11-19 | End: 2020-11-19

## 2020-11-19 RX ORDER — ATORVASTATIN CALCIUM 20 MG/1
40 TABLET, FILM COATED ORAL NIGHTLY
Status: DISCONTINUED | OUTPATIENT
Start: 2020-11-19 | End: 2020-11-19

## 2020-11-19 RX ORDER — POTASSIUM CHLORIDE 750 MG/1
20 TABLET, FILM COATED, EXTENDED RELEASE ORAL ONCE
Status: COMPLETED | OUTPATIENT
Start: 2020-11-19 | End: 2020-11-19

## 2020-11-19 RX ORDER — METHYLPREDNISOLONE SODIUM SUCCINATE 40 MG/ML
40 INJECTION, POWDER, LYOPHILIZED, FOR SOLUTION INTRAMUSCULAR; INTRAVENOUS ONCE
Status: COMPLETED | OUTPATIENT
Start: 2020-11-19 | End: 2020-11-19

## 2020-11-19 RX ORDER — SODIUM BICARBONATE 650 MG/1
1300 TABLET ORAL 3 TIMES DAILY
Status: DISCONTINUED | OUTPATIENT
Start: 2020-11-19 | End: 2020-11-22 | Stop reason: HOSPADM

## 2020-11-19 RX ORDER — ATORVASTATIN CALCIUM 20 MG/1
10 TABLET, FILM COATED ORAL NIGHTLY
Status: DISCONTINUED | OUTPATIENT
Start: 2020-11-19 | End: 2020-11-20

## 2020-11-19 RX ORDER — HYDROXYZINE HYDROCHLORIDE 25 MG/1
25 TABLET, FILM COATED ORAL NIGHTLY
COMMUNITY

## 2020-11-19 RX ORDER — ATENOLOL 50 MG/1
50 TABLET ORAL
Status: DISCONTINUED | OUTPATIENT
Start: 2020-11-19 | End: 2020-11-22 | Stop reason: HOSPADM

## 2020-11-19 RX ORDER — ONDANSETRON 2 MG/ML
4 INJECTION INTRAMUSCULAR; INTRAVENOUS EVERY 6 HOURS PRN
Status: DISCONTINUED | OUTPATIENT
Start: 2020-11-19 | End: 2020-11-22 | Stop reason: HOSPADM

## 2020-11-19 RX ADMIN — DEXTROSE MONOHYDRATE 25 G: 500 INJECTION PARENTERAL at 12:46

## 2020-11-19 RX ADMIN — ATENOLOL 50 MG: 50 TABLET ORAL at 21:53

## 2020-11-19 RX ADMIN — SODIUM CHLORIDE, PRESERVATIVE FREE 10 ML: 5 INJECTION INTRAVENOUS at 21:56

## 2020-11-19 RX ADMIN — ALLOPURINOL 100 MG: 100 TABLET ORAL at 21:54

## 2020-11-19 RX ADMIN — CEFEPIME HYDROCHLORIDE 2 G: 2 INJECTION, POWDER, FOR SOLUTION INTRAVENOUS at 13:50

## 2020-11-19 RX ADMIN — SODIUM BICARBONATE 1300 MG: 650 TABLET ORAL at 21:54

## 2020-11-19 RX ADMIN — FAMOTIDINE 20 MG: 20 TABLET, FILM COATED ORAL at 18:42

## 2020-11-19 RX ADMIN — SODIUM CHLORIDE 125 ML/HR: 9 INJECTION, SOLUTION INTRAVENOUS at 14:49

## 2020-11-19 RX ADMIN — SODIUM CHLORIDE 1000 ML: 9 INJECTION, SOLUTION INTRAVENOUS at 12:04

## 2020-11-19 RX ADMIN — ONDANSETRON 4 MG: 2 INJECTION INTRAMUSCULAR; INTRAVENOUS at 18:42

## 2020-11-19 RX ADMIN — PARICALCITOL 2 MCG: 1 CAPSULE, LIQUID FILLED ORAL at 21:56

## 2020-11-19 RX ADMIN — DEXTROSE AND SODIUM CHLORIDE 75 ML/HR: 5; 900 INJECTION, SOLUTION INTRAVENOUS at 18:42

## 2020-11-19 RX ADMIN — SIROLIMUS 2 MG: 0.5 TABLET, SUGAR COATED ORAL at 21:55

## 2020-11-19 RX ADMIN — ACETAMINOPHEN 650 MG: 325 TABLET, FILM COATED ORAL at 21:52

## 2020-11-19 RX ADMIN — ONDANSETRON 4 MG: 2 INJECTION INTRAMUSCULAR; INTRAVENOUS at 14:43

## 2020-11-19 RX ADMIN — ATORVASTATIN CALCIUM 10 MG: 20 TABLET, FILM COATED ORAL at 21:53

## 2020-11-19 RX ADMIN — SODIUM CHLORIDE, PRESERVATIVE FREE 10 ML: 5 INJECTION INTRAVENOUS at 14:43

## 2020-11-19 RX ADMIN — PREDNISONE 5 MG: 5 TABLET ORAL at 21:54

## 2020-11-19 RX ADMIN — POTASSIUM CHLORIDE 20 MEQ: 750 TABLET, EXTENDED RELEASE ORAL at 21:52

## 2020-11-19 RX ADMIN — VANCOMYCIN HYDROCHLORIDE 1250 MG: 10 INJECTION, POWDER, LYOPHILIZED, FOR SOLUTION INTRAVENOUS at 18:43

## 2020-11-19 RX ADMIN — METHYLPREDNISOLONE SODIUM SUCCINATE 40 MG: 40 INJECTION, POWDER, FOR SOLUTION INTRAMUSCULAR; INTRAVENOUS at 15:17

## 2020-11-19 NOTE — ED NOTES
Pt reports for several days N/V/D with fever highest 101, pt states she is a kidney transplant patient.     Patient was placed in face mask during first look triage.  Patient was wearing a face mask throughout encounter.  I wore personal protective equipment throughout the encounter.  Hand hygiene was performed before and after patient encounter.        Anh Donaldson, RN  11/19/20 1019

## 2020-11-19 NOTE — ED NOTES
Pt wearing mask. Myself had mask, and eye wear/PPE when present with pt.     Emma Tello, PCT  11/19/20 8227

## 2020-11-19 NOTE — ED NOTES
Pt states that for the last 3/4 days she has had N/V/D with mild abdominal pain in the RLQ and the right flank. States that over the last few months she has had a decrease in appetite and weight loss. Pt reports fatigue and weakness. Pt masked and this RN wore all appropriate PPE.      Lou Mitchell, RN  11/19/20 9495

## 2020-11-19 NOTE — H&P
History and physical    Primary care physician  Dr. Martinez    Chief complaint  Fever chills  Nausea vomiting diarrhea    History of present illness  50-year-old white female with history of status post kidney transplant diabetes mellitus hypertension hyperlipidemia and chronic kidney disease stage IV presented to [facility] emergency room with fever chills abdominal pain nausea vomiting diarrhea started Monday.  Patient has no appetite for several months.  Patient has lost 40 pounds in 3 months.  Patient denies any chest pain increased shortness of breath.  Patient work-up in ER revealed sepsis with acute kidney injury and also found to be hypocalcemic.  Patient admitted for management.  At the time of interview she is in no distress and wants to eat.     PAST MEDICAL HISTORY  • Acid reflux disease     • Bruises easily     • Depression     • Diabetes mellitus (CMS/HCC)     • Disease of thyroid gland     • End stage renal disease (CMS/HCC)     • Fistula, arteriovenous, acquired (CMS/HCC)     • Hiatal hernia     • Hip pain, left     • History of anesthesia complications     • History of transfusion     • Hyperlipemia     • Hypertension     • Kidney transplant recipient     • Occasional tremors     • Osteoporosis     • PONV (postoperative nausea and vomiting)     • Urinary retention     • UTI (urinary tract infection)        PAST SURGICAL HISTORY              Procedure Laterality Date   • APPENDECTOMY       • ARTERIOVENOUS FISTULA Left 09/1994   • BLADDER REPAIR       • COLONOSCOPY       • GYNECOLOGIC CRYOSURGERY         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   • HIATAL HERNIA REPAIR         age 15   • HIP SURGERY         replacements bilt with core depressions    • JOINT REPLACEMENT Bilateral       HIP   • NEPHRECTOMY Right 09/1994   • NEPHROLITHOTOMY Right 01/05/1995     TRANSPLANT   • REIMPLANT URETER IN BLADDER       • STOMACH SURGERY         area near transplant due to necrosis area removed    "  • TONSILLECTOMY       • TOTAL HIP ARTHROPLASTY REVISION Right     • TOTAL HIP ARTHROPLASTY REVISION Left 6/28/2017     Procedure: LT TOTAL HIP ARTHROPLASTY REVISION;  Surgeon: Wil Curtis MD;  Location: [facility];  Service:         FAMILY HISTORY           Problem Relation Age of Onset   • Cancer Mother     • Stroke Maternal Grandmother     • Malig Hyperthermia Neg Hx        SOCIAL HISTORY                 Socioeconomic History   • Marital status:        Spouse name: Not on file   • Number of children: Not on file   • Years of education: Not on file   • Highest education level: Not on file   Tobacco Use   • Smoking status: Never Smoker   • Smokeless tobacco: Never Used   Substance and Sexual Activity   • Alcohol use: No   • Drug use: No   • Sexual activity: Defer        ALLERGIES  Aldomet [methyldopa], Dilaudid [hydromorphone hcl], and Morphine and related  Home medications reviewed     REVIEW OF SYSTEMS  All systems reviewed and negative except for those discussed in HPI.      PHYSICAL EXAM  Blood pressure 134/80, pulse 98, temperature 100 °F (37.8 °C), temperature source Oral, resp. rate 16, height 160 cm (63\"), weight 60.3 kg (133 lb), last menstrual period 11/19/2020, SpO2 100 %.    GENERAL: Appears older than stated age, non-toxic appearing, not distressed  HENT: normocephalic, atraumatic  EYES: no scleral icterus, PERRL, pale sclera  CV: regular rhythm, regular rate, no murmur  RESPIRATORY: normal effort, CTAB  ABDOMEN: soft, normal bowel sounds, nontender  MUSCULOSKELETAL: no deformity  NEURO: alert, moves all extremities, follows commands, mental status normal/baseline  SKIN: warm, dry, no rash   Psych: Appropriate mood and affect    LAB RESULTS  Lab Results (last 24 hours)     Procedure Component Value Units Date/Time    POC Glucose Once [[id number]]  (Abnormal) Collected: 11/19/20 1623    Specimen: Blood Updated: 11/19/20 1624     Glucose 59 mg/dL     Urine Culture - Urine, Urine, Clean " Catch [[id number]] Collected: 11/19/20 1200    Specimen: Urine, Clean Catch Updated: 11/19/20 1526    POC Glucose Once [[id number]]  (Normal) Collected: 11/19/20 1438    Specimen: Blood Updated: 11/19/20 1438     Glucose 96 mg/dL     Respiratory Panel PCR w/COVID-19(SARS-CoV-2) WEN/KATINA/JORGE A/PAD/COR/MAD/ANGELICA In-House, NP Swab in UTM/VTM, 3-4 HR TAT - Swab, Nasopharynx [[id number]]  (Normal) Collected: 11/19/20 1206    Specimen: Swab from Nasopharynx Updated: 11/19/20 1418     ADENOVIRUS, PCR Not Detected     Coronavirus 229E Not Detected     Coronavirus HKU1 Not Detected     Coronavirus NL63 Not Detected     Coronavirus OC43 Not Detected     COVID19 Not Detected     Human Metapneumovirus Not Detected     Human Rhinovirus/Enterovirus Not Detected     Influenza A PCR Not Detected     Influenza B PCR Not Detected     Parainfluenza Virus 1 Not Detected     Parainfluenza Virus 2 Not Detected     Parainfluenza Virus 3 Not Detected     Parainfluenza Virus 4 Not Detected     RSV, PCR Not Detected     Bordetella pertussis pcr Not Detected     Bordetella parapertussis PCR Not Detected     Chlamydophila pneumoniae PCR Not Detected     Mycoplasma pneumo by PCR Not Detected    Narrative:      Fact sheet for providers: https://docs.[employer]/wp-content/uploads/SDM8441-6930-ZR4.1-EUA-Provider-Fact-Sheet-3.pdf    Fact sheet for patients: https://docs.[employer]/wp-content/uploads/YMO3486-5710-NA4.1-EUA-Patient-Fact-Sheet-1.pdf    Blood Culture - Blood, Arm, Right [[id number]] Collected: 11/19/20 1347    Specimen: Blood from Arm, Right Updated: 11/19/20 1400    Blood Culture - Blood, Hand, Right [[id number]] Collected: 11/19/20 1347    Specimen: Blood from Hand, Right Updated: 11/19/20 1400    CBC & Differential [[id number]]  (Abnormal) Collected: 11/19/20 1052    Specimen: Blood Updated: 11/19/20 1356    Narrative:      The following orders were created for panel order CBC & Differential.  Procedure                                "Abnormality         Status                     ---------                               -----------         ------                     CBC Auto Differential[[id number]]        Abnormal            Final result                 Please view results for these tests on the individual orders.    CBC Auto Differential [[id number]]  (Abnormal) Collected: 11/19/20 1052    Specimen: Blood Updated: 11/19/20 1356     WBC 7.36 10*3/mm3      RBC 3.38 10*6/mm3      Hemoglobin 8.9 g/dL      Hematocrit 27.4 %      MCV 81.1 fL      MCH 26.3 pg      MCHC 32.5 g/dL      RDW 15.6 %      RDW-SD 45.1 fl      MPV 8.8 fL      Platelets 330 10*3/mm3      Neutrophil % 67.8 %      Lymphocyte % 21.3 %      Monocyte % 8.7 %      Eosinophil % 0.8 %      Basophil % 0.3 %      Neutrophils, Absolute 4.99 10*3/mm3      Lymphocytes, Absolute 1.57 10*3/mm3      Monocytes, Absolute 0.64 10*3/mm3      Eosinophils, Absolute 0.06 10*3/mm3      Basophils, Absolute 0.02 10*3/mm3     Scan Slide [[id number]] Collected: 11/19/20 1052    Specimen: Blood Updated: 11/19/20 1356     Ovalocytes Slight/1+     Spherocytes Slight/1+     WBC Morphology Normal     Platelet Morphology Normal    Procalcitonin [[id number]]  (Abnormal) Collected: 11/19/20 1052    Specimen: Blood Updated: 11/19/20 1353     Procalcitonin 0.35 ng/mL     Narrative:      As a Marker for Sepsis (Non-Neonates):   1. <0.5 ng/mL represents a low risk of severe sepsis and/or septic shock.  1. >2 ng/mL represents a high risk of severe sepsis and/or septic shock.    As a Marker for Lower Respiratory Tract Infections that require antibiotic therapy:  PCT on Admission     Antibiotic Therapy             6-12 Hrs later  > 0.5                Strongly Recommended            >0.25 - <0.5         Recommended  0.1 - 0.25           Discouraged                   Remeasure/reassess PCT  <0.1                 Strongly Discouraged          Remeasure/reassess PCT      As 28 day mortality risk marker: \"Change in Procalcitonin " "Result\" (> 80 % or <=80 %) if Day 0 (or Day 1) and Day 4 values are available. Refer to http://www.[facility]-pct-calculator.com/   Change in PCT <=80 %   A decrease of PCT levels below or equal to 80 % defines a positive change in PCT test result representing a higher risk for 28-day all-cause mortality of patients diagnosed with severe sepsis or septic shock.  Change in PCT > 80 %   A decrease of PCT levels of more than 80 % defines a negative change in PCT result representing a lower risk for 28-day all-cause mortality of patients diagnosed with severe sepsis or septic shock.                Results may be falsely decreased if patient taking Biotin.     POC Glucose Once [[id number]]  (Abnormal) Collected: 11/19/20 1324    Specimen: Blood Updated: 11/19/20 1326     Glucose 166 mg/dL     POC Glucose Once [[id number]]  (Abnormal) Collected: 11/19/20 1239    Specimen: Blood Updated: 11/19/20 1245     Glucose 30 mg/dL     Urinalysis, Microscopic Only - Urine, Clean Catch [[id number]]  (Abnormal) Collected: 11/19/20 1200    Specimen: Urine, Clean Catch Updated: 11/19/20 1239     RBC, UA 0-2 /HPF      WBC, UA 31-50 /HPF      Bacteria, UA None Seen /HPF      Squamous Epithelial Cells, UA 0-2 /HPF      Hyaline Casts, UA 3-6 /LPF      Methodology Automated Microscopy    POC Glucose Once [[id number]]  (Normal) Collected: 11/19/20 1236    Specimen: Blood Updated: 11/19/20 1238     Glucose 105 mg/dL     Urinalysis With Microscopic If Indicated (No Culture) - Urine, Clean Catch [[id number]]  (Abnormal) Collected: 11/19/20 1200    Specimen: Urine, Clean Catch Updated: 11/19/20 1237     Color, UA Yellow     Appearance, UA Cloudy     pH, UA 7.5     Specific Gravity, UA 1.006     Glucose, UA Negative     Ketones, UA Negative     Bilirubin, UA Negative     Blood, UA Small (1+)     Protein,  mg/dL (2+)     Leuk Esterase, UA Large (3+)     Nitrite, UA Negative     Urobilinogen, UA 0.2 E.U./dL    Lactic Acid, Plasma [[id number]]  (Normal) " Collected: 11/19/20 1203    Specimen: Blood Updated: 11/19/20 1233     Lactate 1.7 mmol/L     Comprehensive Metabolic Panel [[id number]]  (Abnormal) Collected: 11/19/20 1052    Specimen: Blood Updated: 11/19/20 1230     Glucose 38 mg/dL      BUN 34 mg/dL      Creatinine 4.20 mg/dL      Sodium 132 mmol/L      Potassium 3.4 mmol/L      Chloride 94 mmol/L      CO2 24.6 mmol/L      Calcium 9.6 mg/dL      Total Protein 8.1 g/dL      Albumin 3.50 g/dL      ALT (SGPT) 11 U/L      AST (SGOT) 28 U/L      Alkaline Phosphatase 112 U/L      Total Bilirubin 0.4 mg/dL      eGFR Non African Amer 11 mL/min/1.73      Comment: <15 Indicative of kidney failure.        eGFR   Amer --     Comment: <15 Indicative of kidney failure.        Globulin 4.6 gm/dL      A/G Ratio 0.8 g/dL      BUN/Creatinine Ratio 8.1     Anion Gap 13.4 mmol/L     Narrative:      GFR Normal >60  Chronic Kidney Disease <60  Kidney Failure <15      hCG, Serum, Qualitative [[id number]]  (Normal) Collected: 11/19/20 1052    Specimen: Blood Updated: 11/19/20 1208     HCG Qualitative Negative    [place] Draw [[id number]] Collected: 11/19/20 1052    Specimen: Blood Updated: 11/19/20 1200    Narrative:      The following orders were created for panel order [place] Draw.  Procedure                               Abnormality         Status                     ---------                               -----------         ------                     Light Blue Top[[id number]]                                   Final result               Green Top (Gel)[[id number]]                                  Final result               Lavender Top[[id number]]                                     Final result               Gold Top - SST[[id number]]                                   Final result                 Please view results for these tests on the individual orders.    Light Blue Top [[id number]] Collected: 11/19/20 1052    Specimen: Blood Updated: 11/19/20 1200     Extra Tube hold for  add-on     Comment: Auto resulted       Green Top (Gel) [[id number]] Collected: 11/19/20 1052    Specimen: Blood Updated: 11/19/20 1200     Extra Tube Hold for add-ons.     Comment: Auto resulted.       Lavender Top [[id number]] Collected: 11/19/20 1052    Specimen: Blood Updated: 11/19/20 1200     Extra Tube hold for add-on     Comment: Auto resulted       Gold Top - SST [[id number]] Collected: 11/19/20 1052    Specimen: Blood Updated: 11/19/20 1200     Extra Tube Hold for add-ons.     Comment: Auto resulted.           Imaging Results (Last 24 Hours)     Procedure Component Value Units Date/Time    XR Chest AP [[id number]] Collected: 11/19/20 1247     Updated: 11/19/20 1252    Narrative:      XR CHEST AP-     INDICATIONS: Cough and fever, possible Covid 19     TECHNIQUE: Frontal view of the chest     COMPARISON: 05/27/2017     FINDINGS:     The heart size is borderline. Pulmonary vasculature is unremarkable. No  focal pulmonary consolidation, pleural effusion, or pneumothorax. No  acute osseous process.       Impression:         No focal pulmonary consolidation. Borderline heart size. Follow-up as  indications persist.           This report was finalized on 11/19/2020 12:49 PM by Dr. Carlos Sheffield M.D.             Current Facility-Administered Medications:   •  allopurinol (ZYLOPRIM) tablet 100 mg, 100 mg, Oral, Nightly, Lázaro Ash MD  •  atenolol (TENORMIN) tablet 50 mg, 50 mg, Oral, Q24H, Lázaro Ash MD  •  atorvastatin (LIPITOR) tablet 10 mg, 10 mg, Oral, Nightly, Lázaro Ash MD  •  buPROPion XL (WELLBUTRIN XL) 24 hr tablet 300 mg, 300 mg, Oral, Daily, Lázaro Ash MD  •  [START ON 11/20/2020] cefepime (MAXIPIME) 2 g/100 mL 0.9% NS (mbp), 2 g, Intravenous, Q24H, Lázaro Ash MD  •  dextrose 5 % and sodium chloride 0.9 % infusion, 75 mL/hr, Intravenous, Continuous, Lázaro Ash MD  •  ondansetron (ZOFRAN) injection 4 mg, 4 mg, Intravenous, Q6H PRN, Lázaro Ash MD  •  paricalcitol (ZEMPLAR) capsule 2  mcg, 2 mcg, Oral, Nightly, Kalyani Ash MD  •  Pharmacy to dose vancomycin, , Does not apply, Continuous PRN, Kalyani Ash MD  •  predniSONE (DELTASONE) tablet 5 mg, 5 mg, Oral, Nightly, Kalyani Ash MD  •  sirolimus (RAPAMUNE) tablet 2 mg, 2 mg, Oral, Daily, Kalyani Ash MD  •  sodium bicarbonate tablet 1,300 mg, 1,300 mg, Oral, TID, Kalyani Ash MD  •  [COMPLETED] Insert peripheral IV, , , Once **AND** sodium chloride 0.9 % flush 10 mL, 10 mL, Intravenous, PRN, Rachelle Han, APRN, 10 mL at 11/19/20 1443  •  vancomycin 1250 mg/250 mL 0.9% NS IVPB ([facility]), 20 mg/kg, Intravenous, Once, Rachelle Han, APRN     ASSESSMENT  Acute kidney injury  Chronic kidney disease stage IV  Status post kidney transplant in 1995  Sepsis source not clear  Diabetes mellitus with low blood sugar  Hypertension  Hyperlipidemia  Gastroesophageal reflux disease    PLAN  Admit  IV fluids  IV antibiotics after obtaining the cultures  Nephrology consult  Infectious disease to follow patient  Adjust home medications  Stress ulcer DVT prophylaxis  Supportive care  Patient is full code  Discussed with nursing staff  Follow closely further recommendation current hospital course    KALYANI ASH MD

## 2020-11-19 NOTE — PROGRESS NOTES
[facility]  Clinical Pharmacy Department     Vancomycin Pharmacokinetic Note    Kinga Taylor is a 50 y.o. female who is on day 1 of pharmacy to dose vancomycin for sepsis    Target level: AUC24 400-600  Consulting Provider:  Dr. Ash  Current Vancomycin Dose: TBD  Planned Duration of Therapy: 5 days  Other Antimicrobials: Cefepime 2 gm IV q24h EI    Allergies  Allergies as of 11/19/2020 - Reviewed 11/19/2020   Allergen Reaction Noted   • Aldomet [methyldopa] Swelling 01/30/2017   • Dilaudid [hydromorphone hcl] Itching 01/30/2017   • Morphine and related Itching 01/30/2017     Microbiology:  Microbiology Results (last 10 days)     Procedure Component Value - Date/Time    Respiratory Panel PCR w/COVID-19(SARS-CoV-2) WEN/KATINA/JORGE A/PAD/COR/MAD/ANGELICA In-House, NP Swab in UTM/VTM, 3-4 HR TAT - Swab, Nasopharynx [[id number]]  (Normal) Collected: 11/19/20 1206    Lab Status: Final result Specimen: Swab from Nasopharynx Updated: 11/19/20 1418     ADENOVIRUS, PCR Not Detected     Coronavirus 229E Not Detected     Coronavirus HKU1 Not Detected     Coronavirus NL63 Not Detected     Coronavirus OC43 Not Detected     COVID19 Not Detected     Human Metapneumovirus Not Detected     Human Rhinovirus/Enterovirus Not Detected     Influenza A PCR Not Detected     Influenza B PCR Not Detected     Parainfluenza Virus 1 Not Detected     Parainfluenza Virus 2 Not Detected     Parainfluenza Virus 3 Not Detected     Parainfluenza Virus 4 Not Detected     RSV, PCR Not Detected     Bordetella pertussis pcr Not Detected     Bordetella parapertussis PCR Not Detected     Chlamydophila pneumoniae PCR Not Detected     Mycoplasma pneumo by PCR Not Detected    Narrative:      Fact sheet for providers: https://docs.[employer]/wp-content/uploads/XSW6832-7872-UE3.1-EUA-Provider-Fact-Sheet-3.pdf    Fact sheet for patients: https://docs.[employer]/wp-content/uploads/JJG6419-5222-VG5.1-EUA-Patient-Fact-Sheet-1.pdf        Radiology/Imaging:  Xr  "Chest Ap  Result Date: 11/19/2020   No focal pulmonary consolidation. Borderline heart size. Follow-up as indications persist.        Vitals/Labs/I&O  160 cm (63\")  60.3 kg (133 lb)  Body mass index is 23.56 kg/m².   Temp:  [98.7 °F (37.1 °C)-100.3 °F (37.9 °C)] 100 °F (37.8 °C)  Heart Rate:  [82-98] 98  Resp:  [16-20] 16  BP: (107-142)/(51-80) 134/80    Results from last 7 days   Lab Units 11/19/20  1052   WBC 10*3/mm3 7.36     Results from last 7 days   Lab Units 11/19/20  1203   LACTATE mmol/L 1.7      Results from last 7 days   Lab Units 11/19/20  1052   PROCALCITONIN ng/mL 0.35*     Estimated Creatinine Clearance: 15.1 mL/min (A) (by C-G formula based on SCr of 4.2 mg/dL (H)).  Results from last 7 days   Lab Units 11/19/20  1052   BUN mg/dL 34*   CREATININE mg/dL 4.20*     Intake & Output (last 3 days)     None        Vancomycin Dosing and Level History:  Last Dose Received in the ED/Outside Facility: None  Is Patient on Dialysis or Renal Replacement: No but presented with YOEL on CKD4 (baseline SCr 2.6, hx of kidney transplant in 01/1985)    Assessment/Plan:    Pulse Dose Initiation:     \"Due to unpredictable renal clearance, will pulse dose patient with vancomycin.\"     Recommendations/Plan:   -Vancomycin 1250 mg (20 mg/kg) IV once was ordered in ER but not given. Spoke with floor nurse who has dose available but patient has lost IV access. RN will hung the dose as soon as new line access is established.   -Obtain vancomycin random level with AM labs tomorrow.  -Continue to monitor SCr    Thank you for involving pharmacy in this patient's care. Please contact pharmacy with any questions or concerns.                           Sharon Yeager, PharmD, BCPS   Clinical Pharmacist  11/19/20 17:24 EST    "

## 2020-11-19 NOTE — ED PROVIDER NOTES
EMERGENCY DEPARTMENT ENCOUNTER    Room Number:  02/02  Date of encounter:  11/19/2020  PCP: Alfredo Martinez MD  Historian: Patient  Nephrologist: Adama Plunkett    PPE    Patient was placed in face mask in first look. Patient was wearing facemask when I entered the room and throughout our encounter. I wore full protective equipment throughout this patient encounter including a face mask, and gloves. Hand hygiene was performed before donning protective equipment and after removal when leaving the room.        HPI:  Chief Complaint: Fever and nausea  A complete HPI/ROS/PMH/PSH/SH/FH are unobtainable due to: Nothing    Context: Kinga Taylor is a 50 y.o. female who arrives to the ED via private vehicle from home with her father.  Patient presents with c/o intermittent, mild to moderate fever that began on Monday.   Patient also complains of vomiting and diarrhea that began yesterday, no appetite for several months.  Patient states that she has had approximately 40 pound weight loss in the last 3 months.  Patient denies chest pain, cough, dysuria, headache, dizziness.  Patient states that nothing makes the symptoms better and nothing worsens symptoms.  Patient states that she had a kidney transplant in 1995, also has hypertension, diabetes.  She states that she started feeling bad on Monday and is just progressively started to feel weaker and with no appetite.  Patient states that she lives with her  and 6-year-old son and denies any close contact with anyone that has been Covid positive.        PAST MEDICAL HISTORY  Active Ambulatory Problems     Diagnosis Date Noted   • Difficulty walking 06/28/2017   • Anemia due to stage 3 chronic kidney disease treated with erythropoietin 06/27/2019     Resolved Ambulatory Problems     Diagnosis Date Noted   • No Resolved Ambulatory Problems     Past Medical History:   Diagnosis Date   • Acid reflux disease    • Bruises easily    • Depression    • Diabetes mellitus  (CMS/HCC)    • Disease of thyroid gland    • End stage renal disease (CMS/HCC)    • Fistula, arteriovenous, acquired (CMS/HCC)    • Hiatal hernia    • Hip pain, left    • History of anesthesia complications    • History of transfusion    • Hyperlipemia    • Hypertension    • Kidney transplant recipient    • Occasional tremors    • Osteoporosis    • PONV (postoperative nausea and vomiting)    • Urinary retention    • UTI (urinary tract infection)          PAST SURGICAL HISTORY  Past Surgical History:   Procedure Laterality Date   • APPENDECTOMY     • ARTERIOVENOUS FISTULA Left 09/1994   • BLADDER REPAIR     • COLONOSCOPY     • GYNECOLOGIC CRYOSURGERY      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   • HIATAL HERNIA REPAIR      age 15   • HIP SURGERY      replacements bilt with core depressions    • JOINT REPLACEMENT Bilateral     HIP   • NEPHRECTOMY Right 09/1994   • NEPHROLITHOTOMY Right 01/05/1995    TRANSPLANT   • REIMPLANT URETER IN BLADDER     • STOMACH SURGERY      area near transplant due to necrosis area removed   • TONSILLECTOMY     • TOTAL HIP ARTHROPLASTY REVISION Right    • TOTAL HIP ARTHROPLASTY REVISION Left 6/28/2017    Procedure: LT TOTAL HIP ARTHROPLASTY REVISION;  Surgeon: Wil Curtis MD;  Location: [facility]  Service:          FAMILY HISTORY  Family History   Problem Relation Age of Onset   • Cancer Mother    • Stroke Maternal Grandmother    • Malig Hyperthermia Neg Hx          SOCIAL HISTORY  Social History     Socioeconomic History   • Marital status:      Spouse name: Not on file   • Number of children: Not on file   • Years of education: Not on file   • Highest education level: Not on file   Tobacco Use   • Smoking status: Never Smoker   • Smokeless tobacco: Never Used   Substance and Sexual Activity   • Alcohol use: No   • Drug use: No   • Sexual activity: Defer         ALLERGIES  Aldomet [methyldopa], Dilaudid [hydromorphone hcl], and Morphine and  related        REVIEW OF SYSTEMS  Review of Systems     All systems reviewed and negative except for those discussed in HPI.        PHYSICAL EXAM    ED Triage Vitals   Temp Heart Rate Resp BP SpO2   11/19/20 1017 11/19/20 1017 11/19/20 1017 11/19/20 1037 11/19/20 1017   99.5 °F (37.5 °C) 89 20 129/78 96 %       Physical Exam  GENERAL: Appears older than stated age, non-toxic appearing, not distressed  HENT: normocephalic, atraumatic  EYES: no scleral icterus, PERRL, pale sclera  CV: regular rhythm, regular rate, no murmur  RESPIRATORY: normal effort, CTAB  ABDOMEN: soft, normal bowel sounds, nontender  MUSCULOSKELETAL: no deformity  NEURO: alert, moves all extremities, follows commands, mental status normal/baseline  SKIN: warm, dry, no rash   Psych: Appropriate mood and affect  Nursing notes and vital signs reviewed      LAB RESULTS  Recent Results (from the past 24 hour(s))   Light Blue Top    Collection Time: 11/19/20 10:52 AM   Result Value Ref Range    Extra Tube hold for add-on    Green Top (Gel)    Collection Time: 11/19/20 10:52 AM   Result Value Ref Range    Extra Tube Hold for add-ons.    Lavender Top    Collection Time: 11/19/20 10:52 AM   Result Value Ref Range    Extra Tube hold for add-on    Gold Top - SST    Collection Time: 11/19/20 10:52 AM   Result Value Ref Range    Extra Tube Hold for add-ons.    Comprehensive Metabolic Panel    Collection Time: 11/19/20 10:52 AM    Specimen: Blood   Result Value Ref Range    Glucose 38 (C) 65 - 99 mg/dL    BUN 34 (H) 6 - 20 mg/dL    Creatinine 4.20 (H) 0.57 - 1.00 mg/dL    Sodium 132 (L) 136 - 145 mmol/L    Potassium 3.4 (L) 3.5 - 5.2 mmol/L    Chloride 94 (L) 98 - 107 mmol/L    CO2 24.6 22.0 - 29.0 mmol/L    Calcium 9.6 8.6 - 10.5 mg/dL    Total Protein 8.1 6.0 - 8.5 g/dL    Albumin 3.50 3.50 - 5.20 g/dL    ALT (SGPT) 11 1 - 33 U/L    AST (SGOT) 28 1 - 32 U/L    Alkaline Phosphatase 112 39 - 117 U/L    Total Bilirubin 0.4 0.0 - 1.2 mg/dL    eGFR Non African  Amer 11 (L) >60 mL/min/1.73    eGFR  African Amer      Globulin 4.6 gm/dL    A/G Ratio 0.8 g/dL    BUN/Creatinine Ratio 8.1 7.0 - 25.0    Anion Gap 13.4 5.0 - 15.0 mmol/L   Procalcitonin    Collection Time: 11/19/20 10:52 AM    Specimen: Blood   Result Value Ref Range    Procalcitonin 0.35 (H) 0.00 - 0.25 ng/mL   hCG, Serum, Qualitative    Collection Time: 11/19/20 10:52 AM    Specimen: Blood   Result Value Ref Range    HCG Qualitative Negative Negative   CBC Auto Differential    Collection Time: 11/19/20 10:52 AM    Specimen: Blood   Result Value Ref Range    WBC 7.36 3.40 - 10.80 10*3/mm3    RBC 3.38 (L) 3.77 - 5.28 10*6/mm3    Hemoglobin 8.9 (L) 12.0 - 15.9 g/dL    Hematocrit 27.4 (L) 34.0 - 46.6 %    MCV 81.1 79.0 - 97.0 fL    MCH 26.3 (L) 26.6 - 33.0 pg    MCHC 32.5 31.5 - 35.7 g/dL    RDW 15.6 (H) 12.3 - 15.4 %    RDW-SD 45.1 37.0 - 54.0 fl    MPV 8.8 6.0 - 12.0 fL    Platelets 330 140 - 450 10*3/mm3    Neutrophil % 67.8 42.7 - 76.0 %    Lymphocyte % 21.3 19.6 - 45.3 %    Monocyte % 8.7 5.0 - 12.0 %    Eosinophil % 0.8 0.3 - 6.2 %    Basophil % 0.3 0.0 - 1.5 %    Neutrophils, Absolute 4.99 1.70 - 7.00 10*3/mm3    Lymphocytes, Absolute 1.57 0.70 - 3.10 10*3/mm3    Monocytes, Absolute 0.64 0.10 - 0.90 10*3/mm3    Eosinophils, Absolute 0.06 0.00 - 0.40 10*3/mm3    Basophils, Absolute 0.02 0.00 - 0.20 10*3/mm3   Scan Slide    Collection Time: 11/19/20 10:52 AM    Specimen: Blood   Result Value Ref Range    Ovalocytes Slight/1+ None Seen    Spherocytes Slight/1+ None Seen    WBC Morphology Normal Normal    Platelet Morphology Normal Normal   Urinalysis With Microscopic If Indicated (No Culture) - Urine, Clean Catch    Collection Time: 11/19/20 12:00 PM    Specimen: Urine, Clean Catch   Result Value Ref Range    Color, UA Yellow Yellow, Straw    Appearance, UA Cloudy (A) Clear    pH, UA 7.5 5.0 - 8.0    Specific Gravity, UA 1.006 1.005 - 1.030    Glucose, UA Negative Negative    Ketones, UA Negative Negative     Bilirubin, UA Negative Negative    Blood, UA Small (1+) (A) Negative    Protein,  mg/dL (2+) (A) Negative    Leuk Esterase, UA Large (3+) (A) Negative    Nitrite, UA Negative Negative    Urobilinogen, UA 0.2 E.U./dL 0.2 - 1.0 E.U./dL   Urinalysis, Microscopic Only - Urine, Clean Catch    Collection Time: 11/19/20 12:00 PM    Specimen: Urine, Clean Catch   Result Value Ref Range    RBC, UA 0-2 None Seen, 0-2 /HPF    WBC, UA 31-50 (A) None Seen, 0-2 /HPF    Bacteria, UA None Seen None Seen /HPF    Squamous Epithelial Cells, UA 0-2 None Seen, 0-2 /HPF    Hyaline Casts, UA 3-6 None Seen /LPF    Methodology Automated Microscopy    Lactic Acid, Plasma    Collection Time: 11/19/20 12:03 PM    Specimen: Blood   Result Value Ref Range    Lactate 1.7 0.5 - 2.0 mmol/L   Respiratory Panel PCR w/COVID-19(SARS-CoV-2) WEN/KATINA/JORGE A/PAD/COR/MAD/ANGELICA In-House, NP Swab in UTM/VTM, 3-4 HR TAT - Swab, Nasopharynx    Collection Time: 11/19/20 12:06 PM    Specimen: Nasopharynx; Swab   Result Value Ref Range    ADENOVIRUS, PCR Not Detected Not Detected    Coronavirus 229E Not Detected Not Detected    Coronavirus HKU1 Not Detected Not Detected    Coronavirus NL63 Not Detected Not Detected    Coronavirus OC43 Not Detected Not Detected    COVID19 Not Detected Not Detected - Ref. Range    Human Metapneumovirus Not Detected Not Detected    Human Rhinovirus/Enterovirus Not Detected Not Detected    Influenza A PCR Not Detected Not Detected    Influenza B PCR Not Detected Not Detected    Parainfluenza Virus 1 Not Detected Not Detected    Parainfluenza Virus 2 Not Detected Not Detected    Parainfluenza Virus 3 Not Detected Not Detected    Parainfluenza Virus 4 Not Detected Not Detected    RSV, PCR Not Detected Not Detected    Bordetella pertussis pcr Not Detected Not Detected    Bordetella parapertussis PCR Not Detected Not Detected    Chlamydophila pneumoniae PCR Not Detected Not Detected    Mycoplasma pneumo by PCR Not Detected Not Detected    POC Glucose Once    Collection Time: 11/19/20 12:36 PM    Specimen: Blood   Result Value Ref Range    Glucose 105 70 - 130 mg/dL   POC Glucose Once    Collection Time: 11/19/20 12:39 PM    Specimen: Blood   Result Value Ref Range    Glucose 30 (C) 70 - 130 mg/dL   POC Glucose Once    Collection Time: 11/19/20  1:24 PM    Specimen: Blood   Result Value Ref Range    Glucose 166 (H) 70 - 130 mg/dL   POC Glucose Once    Collection Time: 11/19/20  2:38 PM    Specimen: Blood   Result Value Ref Range    Glucose 96 70 - 130 mg/dL       Ordered the above labs and independently reviewed the results.      RADIOLOGY  Xr Chest Ap    Result Date: 11/19/2020  XR CHEST AP-  INDICATIONS: Cough and fever, possible Covid 19  TECHNIQUE: Frontal view of the chest  COMPARISON: 05/27/2017  FINDINGS:  The heart size is borderline. Pulmonary vasculature is unremarkable. No focal pulmonary consolidation, pleural effusion, or pneumothorax. No acute osseous process.       No focal pulmonary consolidation. Borderline heart size. Follow-up as indications persist.    This report was finalized on 11/19/2020 12:49 PM by Dr. Carlos Sheffield M.D.        I ordered the above noted radiological studies and viewed the images on the PACS system.           MEDICAL RECORD REVIEW  Medical records reviewed in epic      PROCEDURES    Critical Care  Performed by: Rachelle Han APRN  Authorized by: Sara Renteria MD     Critical care provider statement:     Critical care time (minutes):  30    Critical care was necessary to treat or prevent imminent or life-threatening deterioration of the following conditions:  Sepsis and metabolic crisis    Critical care was time spent personally by me on the following activities:  Ordering and performing treatments and interventions, development of treatment plan with patient or surrogate, ordering and review of laboratory studies, discussions with consultants, ordering and review of radiographic studies,  discussions with primary provider, evaluation of patient's response to treatment, re-evaluation of patient's condition, examination of patient, review of old charts and obtaining history from patient or surrogate            DIFFERENTIAL DIAGNOSIS  Differential diagnosis include but are not limited to the following: Dehydration, COVID-19, pneumonia, electrolyte abnormality, anemia, viral illness      PROGRESS, DATA ANALYSIS, CONSULTS, AND MEDICAL DECISION MAKING        ED Course as of Nov 19 1507   Thu Nov 19, 2020   1218 Discussed pertinent information from history and physical exam with patient.  Discussed differential diagnosis and plan for ED evaluation/work-up and treatment including labs, chest x-ray, IV fluids.  All questions answered.  Patient is agreeable with this plan.        [MS]   1236 Glucose(!!): 38 [MS]   1236 BUN(!): 34 [MS]   1236 Creatinine(!): 4.20 [MS]   1236 Patient is blood glucose is 38, will give an amp of D50 and patient is currently receiving IV fluids.  Patient has a elevated BUN and creatinine, the last parison that I have was 3 years ago when they were essentially normal.   Potassium(!): 3.4 [MS]   1431 Discussed with patient's nephrology Dr. Plunkett regarding her current symptoms and lab values.  He is going to have one of the physicians that will be seen the patient in consult to call back.   Glucose(!): 166 [MS]   1441 Discussed with Dr Welsh regarding patient's relevant history, exam, workup and ED findings/concerns.  Dr Welsh agrees to see patient in consult.  He states he will see her while in the ER.         [MS]   1442 Glucose 96, patient is currenlty dry heaving and shaking, Zofran 4 mg IV ordered and NS at 125 an hour.   Glucose: 96 [MS]   1444 Temp is 100.3, Tylenol will be ordered.    [MS]   1500 Consult Note    Discussed care with Dr Ash  Reviewed patient's history, exam, results and need for admission secondary to Acute kidney injury, sepsis  Dr. Ash accepts  the patient to be admitted to inpatient telemetry bed.        [MS]   1502 Dr Welsh has evaluated patient, would like for patient to have 1 dose of Vancomycin and 40 mg of IV Solumedrol.    [MS]      ED Course User Index  [MS] Rachelle Han, APRN     ADMISSION    Discussed treatment plan and reason for admission with pt/family and admitting physician.  Pt/family voiced understanding of the plan for admission for further testing/treatment as needed.      DIAGNOSIS  Final diagnoses:   Sepsis with acute renal failure without septic shock, due to unspecified organism, unspecified acute renal failure type (CMS/[facility])   Acute kidney injury (CMS/[facility])   Hypoglycemia           MEDICATIONS GIVEN IN ED    Medications   sodium chloride 0.9 % flush 10 mL (10 mL Intravenous Given 11/19/20 1443)   sodium chloride 0.9 % infusion (125 mL/hr Intravenous New Bag 11/19/20 1449)   acetaminophen (TYLENOL) tablet 1,000 mg (has no administration in time range)   vancomycin 1250 mg/250 mL 0.9% NS IVPB ([facility]) (has no administration in time range)   methylPREDNISolone sodium succinate (SOLU-Medrol) injection 40 mg (has no administration in time range)   sodium chloride 0.9 % bolus 1,000 mL (0 mL Intravenous Stopped 11/19/20 1443)   dextrose (D50W) 25 g/ 50mL Intravenous Solution 25 g (25 g Intravenous Given 11/19/20 1246)   cefepime (MAXIPIME) 2 g/100 mL 0.9% NS (mbp) (0 g Intravenous Stopped 11/19/20 1434)   ondansetron (ZOFRAN) injection 4 mg (4 mg Intravenous Given 11/19/20 1443)           COURSE & MEDICAL DECISION MAKING  Any/All labs and Any/All Imaging studies that were ordered were reviewed and are noted above.  Results were reviewed/discussed with the patient and they were also made aware of online assess.   Pt also made aware that some labs, such as cultures, will not be resulted during ER visit and follow up with PMD is necessary.        Rachelle Han, KEYLA  11/19/20 1502

## 2020-11-19 NOTE — ED NOTES
Pt was visibly shaking while seated at bedside, temp rechecked, pt asked to wait to try taking po zofran for a few minutes. Not actively vomiting at this time      Celia Beaulieu, RN  11/19/20 5948

## 2020-11-19 NOTE — CONSULTS
Referring Provider: Rachelle Han APRN   Reason for Consultation:YOEL    Subjective     Chief complaint   Chief Complaint   Patient presents with   • Nausea   • Fever       History of present illness:      50 years old white female with a past medical history of kidney transplantation in January 1985.  Patient is currently being maintained on Rapamune and prednisone and previously on Prograf and prednisone.  Her baseline creatinine appears to be around 2.6 mg/dL as of September 2020.  Patient came to the hospital because of fever chills.  Her urinalysis showed white blood cells less than 2050.  She does have history of recurrent UTI.  Patient was started on vancomycin and cefepime.  We were consulted to help with management of her acute kidney injury.          Past Medical History:   Diagnosis Date   • Acid reflux disease    • Bruises easily    • Depression    • Diabetes mellitus (CMS/[facility])    • Disease of thyroid gland     PARATHYROID   • End stage renal disease (CMS/HCC)    • Fistula, arteriovenous, acquired (CMS/HCC)     left arm   • Hiatal hernia    • Hip pain, left    • History of anesthesia complications     nightmares and has trouble waking up   • History of transfusion     UNSURE OF LAST.  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;  Service:      Family History   Problem Relation Age of Onset   • Cancer Mother    • Stroke Maternal Grandmother    • Malig Hyperthermia Neg Hx      Social History     Tobacco Use   • Smoking status: Never Smoker   • Smokeless tobacco: Never Used   Substance Use Topics   • Alcohol use: No   • Drug use: No     (Not in a hospital admission)    Allergies:  Aldomet [methyldopa], Dilaudid [hydromorphone hcl], and Morphine and related    Review of Systems  Pertinent items are noted in HPI.    Objective     Vital Signs  Temp:  [98.7 °F (37.1 °C)-100.3 °F (37.9 °C)] 100.3 °F (37.9 °C)  Heart Rate:  [82-91] 91  Resp:  [18-20] 18  BP: (107-142)/(51-80) 121/58    Flowsheet Rows      First Filed Value   Admission Height  160 cm (63\") Documented at 11/19/2020 1017   Admission Weight  60.3 kg (133 lb) Documented at 11/19/2020 1017           No intake/output data recorded.  No intake/output data recorded.  No intake or output data in the 24 hours ending 11/19/20 1513    Physical Exam:     General Appearance:    Alert, cooperative, in no acute distress   Head:    Normocephalic, without obvious abnormality, atraumatic   Eyes:            Lids and lashes normal, conjunctivae and sclerae normal, no   icterus, no pallor, corneas clear, PERRLA   Ears:    Ears appear intact with no abnormalities noted   Throat:   No oral lesions, no thrush, oral mucosa moist   Neck:   No adenopathy, supple, trachea midline, no thyromegaly, no     carotid bruit, no JVD   Back:     No kyphosis present, no scoliosis present, no skin lesions,       erythema or scars, no tenderness to percussion or                   palpation,   range " of motion normal   Lungs:     Clear to auscultation,respirations regular, even and                   unlabored    Heart:    Regular rhythm and normal rate, normal S1 and S2, no            murmur, no gallop, no rub, no click   Breast Exam:    Deferred   Abdomen:     Normal bowel sounds, no masses, no organomegaly, soft        + tender, non-distended, no guarding, no rebound                 tenderness   Genitalia:    Deferred   Extremities:   Moves all extremities well, no edema, no cyanosis, no              redness   Pulses:   Pulses palpable and equal bilaterally   Skin:   No bleeding, bruising or rash   Lymph nodes:   No palpable adenopathy   Neurologic:   Cranial nerves 2 - 12 grossly intact, sensation intact, DTR        present and equal bilaterally       Results Review:  Results from last 7 days   Lab Units 11/19/20  1052   SODIUM mmol/L 132*   POTASSIUM mmol/L 3.4*   CHLORIDE mmol/L 94*   CO2 mmol/L 24.6   BUN mg/dL 34*   CREATININE mg/dL 4.20*   CALCIUM mg/dL 9.6   BILIRUBIN mg/dL 0.4   ALK PHOS U/L 112   ALT (SGPT) U/L 11   AST (SGOT) U/L 28   GLUCOSE mg/dL 38*       Estimated Creatinine Clearance: 15.1 mL/min (A) (by C-G formula based on SCr of 4.2 mg/dL (H)).          Results from last 7 days   Lab Units 11/19/20  1052   WBC 10*3/mm3 7.36   HEMOGLOBIN g/dL 8.9*   PLATELETS 10*3/mm3 330             Active Medications  acetaminophen, 1,000 mg, Oral, Once  methylPREDNISolone sodium succinate, 40 mg, Intravenous, Once  vancomycin, 20 mg/kg, Intravenous, Once      sodium chloride, 125 mL/hr, Last Rate: 125 mL/hr (11/19/20 1449)        Assessment/Plan       -Acute kidney injury on chronic kidney disease stage IV: Likely due to sepsis and UTI.  Her allograft is very tender.  She does have history of recurrent UTI.  We will get urine culture and blood culture.  Will start patient on broad-spectrum antibiotic.  Will continue Rapamune and start her on Solu-Medrol 40 mg IV daily.    -History of kidney transplantation  in January 1995 with baseline creatinine 2.4 mg/dL  -Urinary tract infection: Patient was started on cefepime and vancomycin.  Awaiting for urine culture and blood culture  -Immunosuppressive medication  -Hypokalemia  -Hyponatremia        Lee Welsh MD  11/19/20  15:13 EST

## 2020-11-19 NOTE — ED PROVIDER NOTES
The MANSI and I have discussed this patient's history, physical exam, and treatment plan. I have reviewed the documentation and personally had a face to face interaction with the patient  I affirm the documentation and agree with the treatment and plan.  The following describes my personal findings.    The patient presents brought from home by her father with report of intermittent fever to 101, vomiting and diarrhea for 2 days, significant weight loss during the past couple of months.  Patient is status post kidney transplant in 1995 and reports she was taken off Prograf and put on Rapamune approximately 2 to 3 months ago by Dr. Adama Plunkett at which time he had noted that her she had elevated creatinine at 2.4.  Patient denies cough, chest pain, shortness of air does report some mild right lower quadrant abdominal discomfort and reports she has had decreased urine output over the past few days  Patient denies rash, wounds    Limited physical exam:  Patient is pale, alert, oriented, ill-appearing  Lungs/cardiovascular: Good air movement bilaterally, no rails, no tachypnea  Abdomen: Soft, mild tenderness to palpation right lower quadrant without guarding or rebound  Back/extremities: Posterior tibial pulses intact bilateral lower extremities without edema    Patient with history of kidney transplant in 1995, hypertension, diabetes    Anticipate admission for IV resuscitation, further testing, treatment as needed    Patient was wearing facemask when I entered the room and throughout our encounter. Full protective equipment was worn throughout this patient encounter including a face mask, eye protection and gloves. Hand hygiene was performed before donning protective equipment and after removal when leaving the room.           Sara Renteria MD  11/19/20 2021

## 2020-11-19 NOTE — PROGRESS NOTES
Clinical Pharmacy Services: Medication History    Kinga Taylor is a 50 y.o. female presenting to [facility] for   Chief Complaint   Patient presents with   • Nausea   • Fever       She  has a past medical history of Acid reflux disease, Bruises easily, Depression, Diabetes mellitus (CMS/[facility]), Disease of thyroid gland, End stage renal disease (CMS/HCC), Fistula, arteriovenous, acquired (CMS/[facility]), Hiatal hernia, Hip pain, left, History of anesthesia complications, History of transfusion, Hyperlipemia, Hypertension, Kidney transplant recipient, Occasional tremors, Osteoporosis, PONV (postoperative nausea and vomiting), Urinary retention, and UTI (urinary tract infection).    Allergies as of 11/19/2020 - Reviewed 11/19/2020   Allergen Reaction Noted   • Aldomet [methyldopa] Swelling 01/30/2017   • Dilaudid [hydromorphone hcl] Itching 01/30/2017   • Morphine and related Itching 01/30/2017       Medication information was obtained from: patient  Pharmacy and Phone Number:     Prior to Admission Medications     Prescriptions Last Dose Informant Patient Reported? Taking?    alendronate (FOSAMAX) 70 MG tablet 11/18/2020 Self Yes Yes    Take 70 mg by mouth Every 7 (Seven) Days. Patient takes on Wednesdays    allopurinol (ZYLOPRIM) 300 MG tablet 11/18/2020 Self Yes Yes    Take 300 mg by mouth Every Night.    atenolol (TENORMIN) 100 MG tablet 11/18/2020 Self Yes Yes    Take 100 mg by mouth Every Night.    atorvastatin (LIPITOR) 40 MG tablet 11/18/2020 Self Yes Yes    Take 40 mg by mouth Every Night.    buPROPion XL (WELLBUTRIN XL) 300 MG 24 hr tablet 11/18/2020 Self Yes Yes    Take 300 mg by mouth Daily.    glyburide (DIAbeta) 2.5 MG tablet 11/18/2020 Self Yes Yes    Take 2.5 mg by mouth Every Morning.    hydrOXYzine (ATARAX) 25 MG tablet 11/18/2020 Self Yes Yes    Take 25 mg by mouth Every Night.    omeprazole (priLOSEC) 40 MG capsule 11/18/2020 Self Yes Yes    Take 40 mg by mouth Every Morning.     paricalcitol (ZEMPLAR) 2 MCG capsule 11/18/2020 Self Yes Yes    Take 2 mg by mouth Every Night.    predniSONE (DELTASONE) 5 MG tablet 11/18/2020 Self Yes Yes    Take 5 mg by mouth Every Night.    sirolimus (Rapamune) 2 MG tablet 11/18/2020 Self Yes Yes    Take 2 mg by mouth Daily.    sodium bicarbonate 650 MG tablet 11/18/2020 Self Yes Yes    Take 1,300 mg by mouth 2 (Two) Times a Day.            Medication notes:     This medication list is complete to the best of my knowledge as of 11/19/2020    Please call if questions.    Celia Saldana [facility]  Medication History Technician  [phone]    11/19/2020 15:34 EST

## 2020-11-20 ENCOUNTER — APPOINTMENT (OUTPATIENT)
Dept: CT IMAGING | Facility: HOSPITAL | Age: 50
End: 2020-11-20

## 2020-11-20 LAB
ABO GROUP BLD: NORMAL
ALBUMIN SERPL-MCNC: 2.2 G/DL (ref 3.5–5.2)
ALBUMIN/GLOB SERPL: 0.5 G/DL
ALP SERPL-CCNC: 83 U/L (ref 39–117)
ALT SERPL W P-5'-P-CCNC: 9 U/L (ref 1–33)
ANION GAP SERPL CALCULATED.3IONS-SCNC: 12.3 MMOL/L (ref 5–15)
AST SERPL-CCNC: 18 U/L (ref 1–32)
BACTERIA SPEC AEROBE CULT: NORMAL
BASOPHILS # BLD AUTO: 0 10*3/MM3 (ref 0–0.2)
BASOPHILS NFR BLD AUTO: 0 % (ref 0–1.5)
BILIRUB SERPL-MCNC: 0.2 MG/DL (ref 0–1.2)
BLD GP AB SCN SERPL QL: NEGATIVE
BUN SERPL-MCNC: 29 MG/DL (ref 6–20)
BUN/CREAT SERPL: 8.9 (ref 7–25)
CALCIUM SPEC-SCNC: 7.7 MG/DL (ref 8.6–10.5)
CHLORIDE SERPL-SCNC: 104 MMOL/L (ref 98–107)
CHOLEST SERPL-MCNC: 140 MG/DL (ref 0–200)
CO2 SERPL-SCNC: 16.7 MMOL/L (ref 22–29)
CREAT SERPL-MCNC: 3.27 MG/DL (ref 0.57–1)
DEPRECATED RDW RBC AUTO: 44.9 FL (ref 37–54)
EOSINOPHIL # BLD AUTO: 0.01 10*3/MM3 (ref 0–0.4)
EOSINOPHIL NFR BLD AUTO: 0.3 % (ref 0.3–6.2)
ERYTHROCYTE [DISTWIDTH] IN BLOOD BY AUTOMATED COUNT: 14.9 % (ref 12.3–15.4)
GFR SERPL CREATININE-BSD FRML MDRD: 15 ML/MIN/1.73
GLOBULIN UR ELPH-MCNC: 4.1 GM/DL
GLUCOSE BLDC GLUCOMTR-MCNC: 322 MG/DL (ref 70–130)
GLUCOSE BLDC GLUCOMTR-MCNC: 373 MG/DL (ref 70–130)
GLUCOSE BLDC GLUCOMTR-MCNC: 383 MG/DL (ref 70–130)
GLUCOSE BLDC GLUCOMTR-MCNC: 73 MG/DL (ref 70–130)
GLUCOSE BLDC GLUCOMTR-MCNC: 97 MG/DL (ref 70–130)
GLUCOSE SERPL-MCNC: 357 MG/DL (ref 65–99)
HBA1C MFR BLD: 7.9 % (ref 4.8–5.6)
HCT VFR BLD AUTO: 20.9 % (ref 34–46.6)
HDLC SERPL-MCNC: 29 MG/DL (ref 40–60)
HGB BLD-MCNC: 6.7 G/DL (ref 12–15.9)
IMM GRANULOCYTES # BLD AUTO: 0.03 10*3/MM3 (ref 0–0.05)
IMM GRANULOCYTES NFR BLD AUTO: 0.9 % (ref 0–0.5)
LDLC SERPL CALC-MCNC: 70 MG/DL (ref 0–100)
LDLC/HDLC SERPL: 2.09 {RATIO}
LYMPHOCYTES # BLD AUTO: 0.63 10*3/MM3 (ref 0.7–3.1)
LYMPHOCYTES NFR BLD AUTO: 18.1 % (ref 19.6–45.3)
MCH RBC QN AUTO: 26.6 PG (ref 26.6–33)
MCHC RBC AUTO-ENTMCNC: 32.1 G/DL (ref 31.5–35.7)
MCV RBC AUTO: 82.9 FL (ref 79–97)
MONOCYTES # BLD AUTO: 0.23 10*3/MM3 (ref 0.1–0.9)
MONOCYTES NFR BLD AUTO: 6.6 % (ref 5–12)
NEUTROPHILS NFR BLD AUTO: 2.59 10*3/MM3 (ref 1.7–7)
NEUTROPHILS NFR BLD AUTO: 74.1 % (ref 42.7–76)
NRBC BLD AUTO-RTO: 0 /100 WBC (ref 0–0.2)
NT-PROBNP SERPL-MCNC: 2931 PG/ML (ref 0–900)
PLATELET # BLD AUTO: 182 10*3/MM3 (ref 140–450)
PMV BLD AUTO: 9.8 FL (ref 6–12)
POTASSIUM SERPL-SCNC: 4.2 MMOL/L (ref 3.5–5.2)
PROT SERPL-MCNC: 6.3 G/DL (ref 6–8.5)
RBC # BLD AUTO: 2.52 10*6/MM3 (ref 3.77–5.28)
RH BLD: POSITIVE
SODIUM SERPL-SCNC: 133 MMOL/L (ref 136–145)
T&S EXPIRATION DATE: NORMAL
TRIGL SERPL-MCNC: 252 MG/DL (ref 0–150)
TSH SERPL DL<=0.05 MIU/L-ACNC: 1.33 UIU/ML (ref 0.27–4.2)
VANCOMYCIN SERPL-MCNC: 21.7 MCG/ML (ref 5–40)
VLDLC SERPL-MCNC: 41 MG/DL (ref 5–40)
WBC # BLD AUTO: 3.49 10*3/MM3 (ref 3.4–10.8)

## 2020-11-20 PROCEDURE — 84443 ASSAY THYROID STIM HORMONE: CPT | Performed by: HOSPITALIST

## 2020-11-20 PROCEDURE — 86900 BLOOD TYPING SEROLOGIC ABO: CPT

## 2020-11-20 PROCEDURE — 74176 CT ABD & PELVIS W/O CONTRAST: CPT

## 2020-11-20 PROCEDURE — 83036 HEMOGLOBIN GLYCOSYLATED A1C: CPT | Performed by: HOSPITALIST

## 2020-11-20 PROCEDURE — 80061 LIPID PANEL: CPT | Performed by: HOSPITALIST

## 2020-11-20 PROCEDURE — 63710000001 PREDNISONE PER 5 MG: Performed by: HOSPITALIST

## 2020-11-20 PROCEDURE — 86850 RBC ANTIBODY SCREEN: CPT | Performed by: HOSPITALIST

## 2020-11-20 PROCEDURE — 25010000002 VANCOMYCIN PER 500 MG: Performed by: HOSPITALIST

## 2020-11-20 PROCEDURE — P9016 RBC LEUKOCYTES REDUCED: HCPCS

## 2020-11-20 PROCEDURE — 63710000001 SIROLIMUS 0.5 MG TABLET: Performed by: HOSPITALIST

## 2020-11-20 PROCEDURE — 36430 TRANSFUSION BLD/BLD COMPNT: CPT

## 2020-11-20 PROCEDURE — 80053 COMPREHEN METABOLIC PANEL: CPT | Performed by: HOSPITALIST

## 2020-11-20 PROCEDURE — 85025 COMPLETE CBC W/AUTO DIFF WBC: CPT | Performed by: HOSPITALIST

## 2020-11-20 PROCEDURE — 80202 ASSAY OF VANCOMYCIN: CPT | Performed by: HOSPITALIST

## 2020-11-20 PROCEDURE — 63710000001 INSULIN LISPRO (HUMAN) PER 5 UNITS: Performed by: HOSPITALIST

## 2020-11-20 PROCEDURE — 83880 ASSAY OF NATRIURETIC PEPTIDE: CPT | Performed by: HOSPITALIST

## 2020-11-20 PROCEDURE — 86900 BLOOD TYPING SEROLOGIC ABO: CPT | Performed by: HOSPITALIST

## 2020-11-20 PROCEDURE — 86901 BLOOD TYPING SEROLOGIC RH(D): CPT | Performed by: HOSPITALIST

## 2020-11-20 PROCEDURE — 86923 COMPATIBILITY TEST ELECTRIC: CPT

## 2020-11-20 PROCEDURE — 82962 GLUCOSE BLOOD TEST: CPT

## 2020-11-20 PROCEDURE — 25010000002 CEFEPIME PER 500 MG: Performed by: HOSPITALIST

## 2020-11-20 RX ORDER — HYDROXYZINE HYDROCHLORIDE 25 MG/1
25 TABLET, FILM COATED ORAL NIGHTLY
Status: DISCONTINUED | OUTPATIENT
Start: 2020-11-21 | End: 2020-11-22 | Stop reason: HOSPADM

## 2020-11-20 RX ORDER — HYDROXYZINE HYDROCHLORIDE 25 MG/1
25 TABLET, FILM COATED ORAL ONCE
Status: COMPLETED | OUTPATIENT
Start: 2020-11-20 | End: 2020-11-20

## 2020-11-20 RX ORDER — INSULIN GLARGINE 100 [IU]/ML
10 INJECTION, SOLUTION SUBCUTANEOUS NIGHTLY
Status: DISCONTINUED | OUTPATIENT
Start: 2020-11-20 | End: 2020-11-21

## 2020-11-20 RX ORDER — SODIUM CHLORIDE 9 MG/ML
75 INJECTION, SOLUTION INTRAVENOUS CONTINUOUS
Status: DISCONTINUED | OUTPATIENT
Start: 2020-11-20 | End: 2020-11-20

## 2020-11-20 RX ADMIN — SODIUM CHLORIDE, PRESERVATIVE FREE 10 ML: 5 INJECTION INTRAVENOUS at 16:10

## 2020-11-20 RX ADMIN — FAMOTIDINE 20 MG: 20 TABLET, FILM COATED ORAL at 06:30

## 2020-11-20 RX ADMIN — FAMOTIDINE 20 MG: 20 TABLET, FILM COATED ORAL at 21:31

## 2020-11-20 RX ADMIN — BUPROPION HYDROCHLORIDE 300 MG: 300 TABLET, EXTENDED RELEASE ORAL at 09:22

## 2020-11-20 RX ADMIN — SODIUM BICARBONATE 1300 MG: 650 TABLET ORAL at 09:24

## 2020-11-20 RX ADMIN — INSULIN LISPRO 5 UNITS: 100 INJECTION, SOLUTION INTRAVENOUS; SUBCUTANEOUS at 12:03

## 2020-11-20 RX ADMIN — SODIUM BICARBONATE 1300 MG: 650 TABLET ORAL at 21:18

## 2020-11-20 RX ADMIN — SODIUM BICARBONATE 1300 MG: 650 TABLET ORAL at 16:10

## 2020-11-20 RX ADMIN — PARICALCITOL 2 MCG: 1 CAPSULE, LIQUID FILLED ORAL at 21:18

## 2020-11-20 RX ADMIN — INSULIN LISPRO 6 UNITS: 100 INJECTION, SOLUTION INTRAVENOUS; SUBCUTANEOUS at 07:57

## 2020-11-20 RX ADMIN — VANCOMYCIN HYDROCHLORIDE 500 MG: 500 INJECTION, POWDER, LYOPHILIZED, FOR SOLUTION INTRAVENOUS at 15:30

## 2020-11-20 RX ADMIN — CEFEPIME HYDROCHLORIDE 2 G: 2 INJECTION, POWDER, FOR SOLUTION INTRAVENOUS at 12:04

## 2020-11-20 RX ADMIN — PREDNISONE 5 MG: 5 TABLET ORAL at 21:18

## 2020-11-20 RX ADMIN — ATENOLOL 50 MG: 50 TABLET ORAL at 21:19

## 2020-11-20 RX ADMIN — HYDROXYZINE HYDROCHLORIDE 25 MG: 25 TABLET ORAL at 15:37

## 2020-11-20 RX ADMIN — SIROLIMUS 2 MG: 0.5 TABLET, SUGAR COATED ORAL at 21:18

## 2020-11-20 RX ADMIN — DEXTROSE AND SODIUM CHLORIDE 75 ML/HR: 5; 900 INJECTION, SOLUTION INTRAVENOUS at 09:18

## 2020-11-20 RX ADMIN — ALLOPURINOL 100 MG: 100 TABLET ORAL at 21:18

## 2020-11-20 NOTE — PLAN OF CARE
Goal Outcome Evaluation:  Plan of Care Reviewed With: patient     Outcome Summary: Pt febrile and nauseated early in shift with great imrpovement with both by morning. Blood sugar high at 373 despite not eating anything all night, pt states thats normal for her in the morning. Pt stated zofran w/o pepcid makes her more nauseated so called to increase pepcid to BID. Blood and urine cultures pending. Monitor labs and VS. Safety maintained.

## 2020-11-20 NOTE — CONSULTS
"Adult Nutrition  Assessment/PES    Patient Name:  Kinga Taylor  YOB: 1970  MRN: [id number]  Admit Date:  11/19/2020    Assessment Date:  11/20/2020  Nutrition assessment triggered by MST score-5 and RN consult.   EMR reviewed. Admitted with YOEL on CKD4 and sepsis. N/V/D for the past couple of days; no appetite for the past several months; at least 15# weight loss per chart in the past 2 months.  Currently on clear liquids. Will follow closely for clinical care, nutritional needs.  Reason for Assessment     Row Name 11/20/20 1132          Reason for Assessment    Reason For Assessment  identified at risk by screening criteria     Diagnosis   YOEL, sepsis; history of status post kidney transplant diabetes mellitus hypertension hyperlipidemia and chronic kidney disease stage IV     Identified At Risk by Screening Criteria  MST SCORE 2+;reduced oral intake over the last month;unintentional loss of 10 lbs or more in the past 2 mos         Nutrition/Diet History     Row Name 11/20/20 1136          Nutrition/Diet History    Typical Food/Fluid Intake  fever chills abdominal pain nausea vomiting diarrhea started Monday.  Patient has no appetite for several months.  Patient has lost 40 pounds in 3 months         Anthropometrics     Row Name 11/20/20 1132          Anthropometrics    Height  160 cm (62.99\")        Admit Weight    Admit Weight  60.3 kg (132 lb 15 oz)        Ideal Body Weight (IBW)    Ideal Body Weight (IBW) (kg)  52.7     % of Ideal Body Weight Assessment  -- 114%        Usual Body Weight (UBW)    Weight Loss  unintentional     Weight Loss Time Frame  15# in the past 2 months        Body Mass Index (BMI)    BMI Assessment  BMI 18.5-24.9: normal BMI-23.5         Labs/Tests/Procedures/Meds     Row Name 11/20/20 1133          Labs/Procedures/Meds    Lab Results Reviewed  reviewed, pertinent     Lab Results Comments  Glu, Creat, BUN, Na        Diagnostic Tests/Procedures    Diagnostic " "Test/Procedure Reviewed  reviewed, pertinent        Medications    Pertinent Medications Reviewed  reviewed, pertinent     Pertinent Medications Comments  pepcid, insulin, prednisone, NaCl with D5%         Physical Findings     Row Name 11/20/20 1133          Physical Findings    Overall Physical Appearance  -- B=20     Gastrointestinal  diarrhea;nausea;vomiting         Estimated/Assessed Needs     Row Name 11/20/20 1134 11/20/20 1132       Calculation Measurements    Weight Used For Calculations  60.3 kg (132 lb 15 oz)      Height    160 cm (62.99\")       Estimated/Assessed Needs    Additional Documentation  KCAL/KG (Group);Protein Requirements (Group);Fluid Requirements (Group)         KCAL/KG    KCAL/KG  25 Kcal/Kg (kcal);30 Kcal/Kg (kcal)      25 Kcal/Kg (kcal)  1507.5      30 Kcal/Kg (kcal)  1809         Protein Requirements    Weight Used For Protein Calculations  60.3 kg (132 lb 15 oz)      Est Protein Requirement Amount (gms/kg)  1.0 gm protein      Estimated Protein Requirements (gms/day)  60.3         Fluid Requirements    Fluid Requirements (mL/day)  1500      RDA Method (mL)  1500          Nutrition Prescription Ordered     Row Name 11/20/20 1134          Nutrition Prescription PO    Current PO Diet  Clear Liquid                 Problem/Interventions:  Problem 1     Row Name 11/20/20 1134          Nutrition Diagnoses Problem 1    Problem 1  Inadequate Nutrient Intake     Etiology (related to)  MNT for Treatment/Condition     Signs/Symptoms (evidenced by)  Report of Mnimal PO Intake;Unintended Weight Change     Unintended Weight Change  Loss     Number of Pounds Lost  15#     Weight loss time period  2 months               Intervention Goal     Row Name 11/20/20 1135          Intervention Goal    General  Maintain nutrition;Meet nutritional needs for age/condition;Reduce/improve symptoms     PO  Initiate feeding;Increase intake;Advance diet     Weight  Maintain weight         Nutrition Intervention     " Row Name 11/20/20 1135          Nutrition Intervention    RD/Tech Action  Care plan reviewd;Follow Tx progress           Education/Evaluation     Row Name 11/20/20 1135          Education    Education  Will Instruct as appropriate        Monitor/Evaluation    Monitor  Per protocol           Electronically signed by:  Stefani Esquivel RD  11/20/20 11:36 EST

## 2020-11-20 NOTE — PROGRESS NOTES
"Daily progress note    Chief complaint  Doing better this morning  No new complaints    History of present illness  50-year-old white female with history of status post kidney transplant diabetes mellitus hypertension hyperlipidemia and chronic kidney disease stage IV presented to [facility] emergency room with fever chills abdominal pain nausea vomiting diarrhea started Monday.  Patient has no appetite for several months.  Patient has lost 40 pounds in 3 months.  Patient denies any chest pain increased shortness of breath.  Patient work-up in ER revealed sepsis with acute kidney injury and also found to be hypocalcemic.  Patient admitted for management.  At the time of interview she is in no distress and wants to eat.      REVIEW OF SYSTEMS  All systems reviewed and negative except for those discussed in HPI.      PHYSICAL EXAM  Blood pressure 126/82, pulse 68, temperature 97.5 °F (36.4 °C), temperature source Oral, resp. rate 16, height 160 cm (62.99\"), weight 60.3 kg (133 lb), last menstrual period 11/19/2020, SpO2 91 %.    GENERAL: Appears older than stated age, non-toxic appearing, not distressed  HENT: normocephalic, atraumatic  EYES: no scleral icterus, PERRL, pale sclera  CV: regular rhythm, regular rate, no murmur  RESPIRATORY: normal effort, CTAB  ABDOMEN: soft, normal bowel sounds, nontender  MUSCULOSKELETAL: no deformity  NEURO: alert, moves all extremities, follows commands, mental status normal/baseline  SKIN: warm, dry, no rash   Psych: Appropriate mood and affect    LAB RESULTS  Lab Results (last 24 hours)     Procedure Component Value Units Date/Time    Urine Culture - Urine, Urine, Clean Catch [[id number]] Collected: 11/19/20 1200    Specimen: Urine, Clean Catch Updated: 11/20/20 1231     Urine Culture 25,000 CFU/mL Mixed Gosia Isolated    Narrative:      Specimen contains mixed organisms of questionable pathogenicity which indicates contamination with commensal gosia.  Further " identification is unlikely to provide clinically useful information.  Suggest recollection.    POC Glucose Once [[id number]]  (Abnormal) Collected: 11/20/20 1126    Specimen: Blood Updated: 11/20/20 1137     Glucose 322 mg/dL     CBC & Differential [[id number]]  (Abnormal) Collected: 11/20/20 0945    Specimen: Blood Updated: 11/20/20 1024    Narrative:      The following orders were created for panel order CBC & Differential.  Procedure                               Abnormality         Status                     ---------                               -----------         ------                     CBC Auto Differential[[id number]]        Abnormal            Final result                 Please view results for these tests on the individual orders.    CBC Auto Differential [[id number]]  (Abnormal) Collected: 11/20/20 0945    Specimen: Blood Updated: 11/20/20 1024     WBC 3.49 10*3/mm3      RBC 2.52 10*6/mm3      Hemoglobin 6.7 g/dL      Hematocrit 20.9 %      MCV 82.9 fL      MCH 26.6 pg      MCHC 32.1 g/dL      RDW 14.9 %      RDW-SD 44.9 fl      MPV 9.8 fL      Platelets 182 10*3/mm3      Neutrophil % 74.1 %      Lymphocyte % 18.1 %      Monocyte % 6.6 %      Eosinophil % 0.3 %      Basophil % 0.0 %      Immature Grans % 0.9 %      Neutrophils, Absolute 2.59 10*3/mm3      Lymphocytes, Absolute 0.63 10*3/mm3      Monocytes, Absolute 0.23 10*3/mm3      Eosinophils, Absolute 0.01 10*3/mm3      Basophils, Absolute 0.00 10*3/mm3      Immature Grans, Absolute 0.03 10*3/mm3      nRBC 0.0 /100 WBC     Hemoglobin A1c [[id number]]  (Abnormal) Collected: 11/20/20 0944    Specimen: Blood Updated: 11/20/20 1024     Hemoglobin A1C 7.90 %     Narrative:      Hemoglobin A1C Ranges:    Increased Risk for Diabetes  5.7% to 6.4%  Diabetes                     >= 6.5%  Diabetic Goal                < 7.0%    BNP [[id number]]  (Abnormal) Collected: 11/20/20 0653    Specimen: Blood Updated: 11/20/20 0744     proBNP 2,931.0 pg/mL     Narrative:       Among patients with dyspnea, NT-proBNP is highly sensitive for the detection of acute congestive heart failure. In addition NT-proBNP of <300 pg/ml effectively rules out acute congestive heart failure with 99% negative predictive value.    Results may be falsely decreased if patient taking Biotin.      TSH [[id number]]  (Normal) Collected: 11/20/20 0653    Specimen: Blood Updated: 11/20/20 0744     TSH 1.330 uIU/mL     Comprehensive Metabolic Panel [[id number]]  (Abnormal) Collected: 11/20/20 0653    Specimen: Blood Updated: 11/20/20 0738     Glucose 357 mg/dL      BUN 29 mg/dL      Creatinine 3.27 mg/dL      Sodium 133 mmol/L      Potassium 4.2 mmol/L      Comment: Slight hemolysis detected by analyzer. Results may be affected.        Chloride 104 mmol/L      CO2 16.7 mmol/L      Calcium 7.7 mg/dL      Total Protein 6.3 g/dL      Albumin 2.20 g/dL      ALT (SGPT) 9 U/L      AST (SGOT) 18 U/L      Alkaline Phosphatase 83 U/L      Total Bilirubin 0.2 mg/dL      eGFR Non African Amer 15 mL/min/1.73      Globulin 4.1 gm/dL      A/G Ratio 0.5 g/dL      BUN/Creatinine Ratio 8.9     Anion Gap 12.3 mmol/L     Narrative:      GFR Normal >60  Chronic Kidney Disease <60  Kidney Failure <15      Lipid Panel [[id number]]  (Abnormal) Collected: 11/20/20 0653    Specimen: Blood Updated: 11/20/20 0738     Total Cholesterol 140 mg/dL      Triglycerides 252 mg/dL      HDL Cholesterol 29 mg/dL      LDL Cholesterol  70 mg/dL      VLDL Cholesterol 41 mg/dL      LDL/HDL Ratio 2.09    Narrative:      Cholesterol Reference Ranges  (U.S. Department of Health and Human Services ATP III Classifications)    Desirable          <200 mg/dL  Borderline High    200-239 mg/dL  High Risk          >240 mg/dL      Triglyceride Reference Ranges  (U.S. Department of Health and Human Services ATP III Classifications)    Normal           <150 mg/dL  Borderline High  150-199 mg/dL  High             200-499 mg/dL  Very High        >500 mg/dL    HDL  Reference Ranges  (U.S. Department of Health and Human Services ATP III Classifcations)    Low     <40 mg/dl (major risk factor for CHD)  High    >60 mg/dl ('negative' risk factor for CHD)        LDL Reference Ranges  (U.S. Department of Health and Human Services ATP III Classifcations)    Optimal          <100 mg/dL  Near Optimal     100-129 mg/dL  Borderline High  130-159 mg/dL  High             160-189 mg/dL  Very High        >189 mg/dL    Vancomycin, Random [[id number]]  (Normal) Collected: 11/20/20 0653    Specimen: Blood Updated: 11/20/20 0735     Vancomycin Random 21.70 mcg/mL     POC Glucose Once [[id number]]  (Abnormal) Collected: 11/20/20 0621    Specimen: Blood Updated: 11/20/20 0629     Glucose 383 mg/dL     POC Glucose Once [[id number]]  (Abnormal) Collected: 11/20/20 0620    Specimen: Blood Updated: 11/20/20 0628     Glucose 373 mg/dL     POC Glucose Once [[id number]]  (Abnormal) Collected: 11/19/20 2115    Specimen: Blood Updated: 11/19/20 2117     Glucose 194 mg/dL     POC Glucose Once [[id number]]  (Normal) Collected: 11/19/20 1753    Specimen: Blood Updated: 11/19/20 1755     Glucose 98 mg/dL     Blood Culture - Blood, Arm, Right [[id number]] Collected: 11/19/20 1741    Specimen: Blood from Arm, Right Updated: 11/19/20 1745    POC Glucose Once [[id number]]  (Abnormal) Collected: 11/19/20 1623    Specimen: Blood Updated: 11/19/20 1624     Glucose 59 mg/dL     POC Glucose Once [[id number]]  (Normal) Collected: 11/19/20 1438    Specimen: Blood Updated: 11/19/20 1438     Glucose 96 mg/dL     Respiratory Panel PCR w/COVID-19(SARS-CoV-2) WEN/KATINA/JORGE A/PAD/COR/MAD/ANGELICA In-House, NP Swab in UTM/[facility], 3-4 HR TAT - Swab, Nasopharynx [[id number]]  (Normal) Collected: 11/19/20 1206    Specimen: Swab from Nasopharynx Updated: 11/19/20 1418     ADENOVIRUS, PCR Not Detected     Coronavirus 229E Not Detected     Coronavirus HKU1 Not Detected     Coronavirus NL63 Not Detected     Coronavirus OC43 Not Detected     COVID19 Not  Detected     Human Metapneumovirus Not Detected     Human Rhinovirus/Enterovirus Not Detected     Influenza A PCR Not Detected     Influenza B PCR Not Detected     Parainfluenza Virus 1 Not Detected     Parainfluenza Virus 2 Not Detected     Parainfluenza Virus 3 Not Detected     Parainfluenza Virus 4 Not Detected     RSV, PCR Not Detected     Bordetella pertussis pcr Not Detected     Bordetella parapertussis PCR Not Detected     Chlamydophila pneumoniae PCR Not Detected     Mycoplasma pneumo by PCR Not Detected    Narrative:      Fact sheet for providers: https://[employer]/wp-content/uploads/CVU4931-7014-LK7.1-EUA-Provider-Fact-Sheet-3.pdf    Fact sheet for patients: https://[employer]/wp-content/uploads/INB3997-7546-GG2.1-EUA-Patient-Fact-Sheet-1.pdf    Blood Culture - Blood, Arm, Right [[id number]] Collected: 11/19/20 1347    Specimen: Blood from Arm, Right Updated: 11/19/20 1400    Blood Culture - Blood, Hand, Right [[id number]] Collected: 11/19/20 1347    Specimen: Blood from Hand, Right Updated: 11/19/20 1400    CBC & Differential [[id number]]  (Abnormal) Collected: 11/19/20 1052    Specimen: Blood Updated: 11/19/20 1356    Narrative:      The following orders were created for panel order CBC & Differential.  Procedure                               Abnormality         Status                     ---------                               -----------         ------                     CBC Auto Differential[[id number]]        Abnormal            Final result                 Please view results for these tests on the individual orders.    CBC Auto Differential [[id number]]  (Abnormal) Collected: 11/19/20 1052    Specimen: Blood Updated: 11/19/20 1356     WBC 7.36 10*3/mm3      RBC 3.38 10*6/mm3      Hemoglobin 8.9 g/dL      Hematocrit 27.4 %      MCV 81.1 fL      MCH 26.3 pg      MCHC 32.5 g/dL      RDW 15.6 %      RDW-SD 45.1 fl      MPV 8.8 fL      Platelets 330 10*3/mm3      Neutrophil % 67.8 %       "Lymphocyte % 21.3 %      Monocyte % 8.7 %      Eosinophil % 0.8 %      Basophil % 0.3 %      Neutrophils, Absolute 4.99 10*3/mm3      Lymphocytes, Absolute 1.57 10*3/mm3      Monocytes, Absolute 0.64 10*3/mm3      Eosinophils, Absolute 0.06 10*3/mm3      Basophils, Absolute 0.02 10*3/mm3     Scan Slide [[id number]] Collected: 11/19/20 1052    Specimen: Blood Updated: 11/19/20 1356     Ovalocytes Slight/1+     Spherocytes Slight/1+     WBC Morphology Normal     Platelet Morphology Normal    Procalcitonin [[id number]]  (Abnormal) Collected: 11/19/20 1052    Specimen: Blood Updated: 11/19/20 1353     Procalcitonin 0.35 ng/mL     Narrative:      As a Marker for Sepsis (Non-Neonates):   1. <0.5 ng/mL represents a low risk of severe sepsis and/or septic shock.  1. >2 ng/mL represents a high risk of severe sepsis and/or septic shock.    As a Marker for Lower Respiratory Tract Infections that require antibiotic therapy:  PCT on Admission     Antibiotic Therapy             6-12 Hrs later  > 0.5                Strongly Recommended            >0.25 - <0.5         Recommended  0.1 - 0.25           Discouraged                   Remeasure/reassess PCT  <0.1                 Strongly Discouraged          Remeasure/reassess PCT      As 28 day mortality risk marker: \"Change in Procalcitonin Result\" (> 80 % or <=80 %) if Day 0 (or Day 1) and Day 4 values are available. Refer to http://www.[facility]-pct-calculator.com/   Change in PCT <=80 %   A decrease of PCT levels below or equal to 80 % defines a positive change in PCT test result representing a higher risk for 28-day all-cause mortality of patients diagnosed with severe sepsis or septic shock.  Change in PCT > 80 %   A decrease of PCT levels of more than 80 % defines a negative change in PCT result representing a lower risk for 28-day all-cause mortality of patients diagnosed with severe sepsis or septic shock.                Results may be falsely decreased if patient taking Biotin.   "    POC Glucose Once [[id number]]  (Abnormal) Collected: 11/19/20 1324    Specimen: Blood Updated: 11/19/20 1326     Glucose 166 mg/dL         Imaging Results (Last 24 Hours)     Procedure Component Value Units Date/Time    CT Abdomen Pelvis Without Contrast [[id number]] Collected: 11/20/20 1001     Updated: 11/20/20 1002    Narrative:      CT ABDOMEN AND PELVIS WITHOUT CONTRAST     HISTORY: 50-year-old female with weight loss and fever. Patient has had  nausea, vomiting, diarrhea with midabdominal pain.     TECHNIQUE: Axial CT images of the abdomen and pelvis were obtained  without administration of intravenous contrast. The patient was not  given oral contrast. Coronal and sagittal reformats were obtained.     COMPARISON: None.     FINDINGS: The patient has a transplant kidney within the right lower  quadrant of the abdomen. There is an ill-defined fluid  collection/phlegmon seen lateral to the mid and lower pole of the  transplant kidney. This measures approximately 4.6 x 2.6 cm. There is  mild to moderate transplant kidney hydronephrosis and renal cortical  thinning. There is a large amount of lipomatous hypertrophy in the  perinephric region and also diffusely within the pelvis. The urinary  bladder is significantly distended. It also demonstrates small  diverticula. The left kidney is not imaged. The native right kidney is  small with foci of dystrophic calcification and prominent extrarenal  pelvis.     Noncontrast attenuation of the liver is normal. The gallbladder, spleen  and the pancreas are normal. Bilateral adrenal glands are normal. There  is a paraumbilical hernia containing fat and a small amount of fluid.  The uterus is anteverted and is displaced towards the left superior  hemipelvis. It demonstrates a somewhat abnormal configuration and may be  secondary to an arcuate or bicornuate uterus. The left ovary is  delineated on image 97 and appears unremarkable. Multiseptated lobulated  low-density lesion  is seen within the midline in the midabdomen, image  68, measuring approximately 4.8 x 3.7 cm. Although not definite, this  could represent the patient's right ovary. There is no pathological  retroperitoneal lymphadenopathy. Bilateral hip prostheses are present.       Impression:      1. Evaluation is limited without contrast. There is a markedly distended  urinary bladder demonstrating diverticula with mild to moderate  transplant kidney hydronephrosis. Urinary catheterization is  recommended. There is also cortical thinning of the transplant kidney.  There is a perinephric fluid/phlegmonous collection as described above.  Follow-up with dedicated sonogram of the transplant kidney is  recommended.  2. There is a large amount of fat seen in the peripelvic region of the  kidney as well as diffusely within the pelvis and this may represent  pelvic lipomatosis. Correlation with any prior cross-sectional imaging  of the abdomen is recommended. A lobulated hypoattenuating lesion within  the midabdomen is favored to represent the patient's displaced right  ovary. The uterus morphology may be suggestive of an arcuate/bicornuate  uterus. Again, correlation with prior cross-sectional imaging and/or  follow-up is recommended.     Radiation dose reduction techniques were utilized, including automated  exposure control and exposure modulation based on body size.                Current Facility-Administered Medications:   •  acetaminophen (TYLENOL) tablet 650 mg, 650 mg, Oral, Q4H PRN, Lázaro Ash MD, 650 mg at 11/19/20 2152  •  allopurinol (ZYLOPRIM) tablet 100 mg, 100 mg, Oral, Nightly, Lázaro Ash MD, 100 mg at 11/19/20 2154  •  atenolol (TENORMIN) tablet 50 mg, 50 mg, Oral, Q24H, Lázaro Ash MD, Stopped at 11/20/20 0922  •  atorvastatin (LIPITOR) tablet 10 mg, 10 mg, Oral, Nightly, Lázaro Ash MD, 10 mg at 11/19/20 2153  •  buPROPion XL (WELLBUTRIN XL) 24 hr tablet 300 mg, 300 mg, Oral, Daily, Lázaro Ash MD, 300  mg at 11/20/20 0922  •  cefepime (MAXIPIME) 2 g/100 mL 0.9% NS (mbp), 2 g, Intravenous, Q24H, Lázaro Ash MD, Last Rate: 25 mL/hr at 11/20/20 1204, 2 g at 11/20/20 1204  •  dextrose 5 % and sodium chloride 0.9 % infusion, 75 mL/hr, Intravenous, Continuous, Lázaro Ash MD, Last Rate: 75 mL/hr at 11/20/20 0918, 75 mL/hr at 11/20/20 0918  •  famotidine (PEPCID) tablet 20 mg, 20 mg, Oral, BID, Lázaro Ash MD, 20 mg at 11/20/20 0630  •  insulin lispro (humaLOG) injection 0-7 Units, 0-7 Units, Subcutaneous, TID AC, Lázaro Ash MD, 5 Units at 11/20/20 1203  •  ondansetron (ZOFRAN) injection 4 mg, 4 mg, Intravenous, Q6H PRN, Lázaro Ash MD, 4 mg at 11/19/20 1842  •  ondansetron ODT (ZOFRAN-ODT) disintegrating tablet 4 mg, 4 mg, Oral, Q6H PRN, Lázaro Ash MD  •  paricalcitol (ZEMPLAR) capsule 2 mcg, 2 mcg, Oral, Nightly, Lázaro Ash MD, 2 mcg at 11/19/20 2156  •  Pharmacy to dose vancomycin, , Does not apply, Continuous PRN, Lázaro Ash MD  •  predniSONE (DELTASONE) tablet 5 mg, 5 mg, Oral, Nightly, Lázaro Ash MD, 5 mg at 11/19/20 2154  •  sirolimus (RAPAMUNE) tablet 2 mg, 2 mg, Oral, Daily, Lázaro Ash MD, 2 mg at 11/19/20 2155  •  sodium bicarbonate tablet 1,300 mg, 1,300 mg, Oral, TID, Lázaro Ash MD, 1,300 mg at 11/20/20 0924  •  [COMPLETED] Insert peripheral IV, , , Once **AND** sodium chloride 0.9 % flush 10 mL, 10 mL, Intravenous, PRN, Rachelle Han, APRN, 10 mL at 11/19/20 8506  •  vancomycin 500 mg/100 mL 0.9% NS IVPB (mbp), 500 mg, Intravenous, Once, Lázaro Ash MD  •  Vancomycin Pharmacy Intermittent Dosing, , Does not apply, Daily, Lázaro Ash MD     ASSESSMENT  Acute kidney injury  Chronic kidney disease stage IV  Status post kidney transplant in 1995  Sepsis source not clear  Diabetes mellitus with low blood sugar  Hypertension  Hyperlipidemia  Chronic anemia with drop in H&H  Gastroesophageal reflux disease    PLAN  CPM  IV fluids  IV antibiotics   Transfuse 2 units packed  RBC  Nephrology consult appreciated  Infectious disease to follow patient  Adjust home medications  Stress ulcer DVT prophylaxis  Supportive care  Discussed with nursing staff  Follow closely further recommendation current hospital course    KALYANI DINERO MD

## 2020-11-20 NOTE — PROGRESS NOTES
"    NEPHROLOGY PROGRESS NOTE    PATIENT IDENTIFICATION:   Name:  Kinga Taylor      MRN:  [id number]     50 y.o.  female             Reason for visit: YOEL    SUBJECTIVE:   Seen and examined.  Denies shortness of air or chest pain.  Feeling better.  No fever.  OBJECTIVE:  Vitals:    11/19/20 2255 11/20/20 0300 11/20/20 0852 11/20/20 1132   BP: 106/61 107/62 126/82    BP Location: Right arm Right arm Right arm    Patient Position: Lying Lying Sitting    Pulse: 85 80 68    Resp: 16 16 16    Temp: 98.5 °F (36.9 °C)  97.5 °F (36.4 °C)    TempSrc: Oral  Oral    SpO2: 97% 100% 91%    Weight:       Height:    160 cm (62.99\")           Body mass index is 23.57 kg/m².    Intake/Output Summary (Last 24 hours) at 11/20/2020 1304  Last data filed at 11/20/2020 1128  Gross per 24 hour   Intake 680 ml   Output 1050 ml   Net -370 ml         Exam:  GEN:  No distress, appears stated age  EYES:   Anicteric sclera  ENT:    External ears/nose normal, MM are moist  NECK:  No adenopathy, JVP none  LUNGS: Normal chest on inspection; not labored  CV:  Normal S1S2, without murmur  ABD:  Non-tender, non-distended, no hepatosplenomegaly, +BS  EXT:  No edema; no cyanosis; clubbing    Scheduled meds:  allopurinol, 100 mg, Oral, Nightly  atenolol, 50 mg, Oral, Q24H  buPROPion XL, 300 mg, Oral, Daily  cefepime, 2 g, Intravenous, Q24H  famotidine, 20 mg, Oral, BID  insulin glargine, 10 Units, Subcutaneous, Nightly  insulin lispro, 0-7 Units, Subcutaneous, TID AC  paricalcitol, 2 mcg, Oral, Nightly  predniSONE, 5 mg, Oral, Nightly  sirolimus, 2 mg, Oral, Daily  sodium bicarbonate, 1,300 mg, Oral, TID  vancomycin, 500 mg, Intravenous, Once  Vancomycin Pharmacy Intermittent Dosing, , Does not apply, Daily      IV meds:                      Pharmacy to dose vancomycin,   sodium chloride, 75 mL/hr        Data Review:    Results from last 7 days   Lab Units 11/20/20  0653 11/19/20  1052   SODIUM mmol/L 133* 132*   POTASSIUM mmol/L 4.2 3.4* "   CHLORIDE mmol/L 104 94*   CO2 mmol/L 16.7* 24.6   BUN mg/dL 29* 34*   CREATININE mg/dL 3.27* 4.20*   CALCIUM mg/dL 7.7* 9.6   BILIRUBIN mg/dL 0.2 0.4   ALK PHOS U/L 83 112   ALT (SGPT) U/L 9 11   AST (SGOT) U/L 18 28   GLUCOSE mg/dL 357* 38*       Estimated Creatinine Clearance: 19.4 mL/min (A) (by C-G formula based on SCr of 3.27 mg/dL (H)).          Results from last 7 days   Lab Units 11/20/20  0945 11/19/20  1052   WBC 10*3/mm3 3.49 7.36   HEMOGLOBIN g/dL 6.7* 8.9*   PLATELETS 10*3/mm3 182 330                   ASSESSMENT:     Sepsis with acute renal failure without septic shock (CMS/HCC)        -Acute kidney injury on chronic kidney disease stage IV: Likely due to sepsis and UTI.  Her allograft is very tender.  She does have history of recurrent UTI.  We will get urine culture and blood culture.  Will start patient on broad-spectrum antibiotic.  Will continue Rapamune and start her on Solu-Medrol 40 mg IV daily.     -History of kidney transplantation in January 1995 with baseline creatinine 2.4 mg/dL  -Urinary tract infection: Patient was started on cefepime and vancomycin.  Awaiting for urine culture and blood culture  -Immunosuppressive medication  -Hypokalemia  -Hyponatremia     Plan:    Kidney function is better.  Will discontinue IV fluid.  We will switch back to p.o. prednisone.  Continue antibiotic awaiting for final culture result.  Surveillance labs    Lee Welsh MD  11/20/2020    13:04 EST

## 2020-11-20 NOTE — PROGRESS NOTES
"  Infectious Diseases Progress Note    Cintia Damon MD     [facility]  [place]: 1 day  Patient Identification:  Name: Kinga Taylor  Age: 50 y.o.  Sex: female  :  1970  MRN: [id number]         Primary Care Physician: Alfredo Martinez MD            Subjective: Feeling  better denies any specific complaints.  Interval History: See consultation note.    Objective:    Scheduled Meds:allopurinol, 100 mg, Oral, Nightly  atenolol, 50 mg, Oral, Q24H  atorvastatin, 10 mg, Oral, Nightly  buPROPion XL, 300 mg, Oral, Daily  cefepime, 2 g, Intravenous, Q24H  famotidine, 20 mg, Oral, BID  insulin lispro, 0-7 Units, Subcutaneous, TID AC  paricalcitol, 2 mcg, Oral, Nightly  predniSONE, 5 mg, Oral, Nightly  sirolimus, 2 mg, Oral, Daily  sodium bicarbonate, 1,300 mg, Oral, TID  vancomycin, 500 mg, Intravenous, Once  Vancomycin Pharmacy Intermittent Dosing, , Does not apply, Daily      Continuous Infusions:dextrose 5 % and sodium chloride 0.9 %, 75 mL/hr, Last Rate: 75 mL/hr (20 0918)  Pharmacy to dose vancomycin,         Vital signs in last 24 hours:  Temp:  [97.5 °F (36.4 °C)-100.3 °F (37.9 °C)] 97.5 °F (36.4 °C)  Heart Rate:  [68-99] 68  Resp:  [16-18] 16  BP: ()/(51-82) 126/82    Intake/Output:    Intake/Output Summary (Last 24 hours) at 2020 1118  Last data filed at 2020 0750  Gross per 24 hour   Intake 680 ml   Output 800 ml   Net -120 ml       Exam:  /82 (BP Location: Right arm, Patient Position: Sitting)   Pulse 68   Temp 97.5 °F (36.4 °C) (Oral)   Resp 16   Ht 160 cm (63\")   Wt 60.3 kg (133 lb)   LMP 2020   SpO2 91%   BMI 23.56 kg/m²   Patient is examined using the personal protective equipment as per guidelines from infection control for this particular patient as enacted.  Hand washing was performed before and after patient interaction.  General Appearance:    Alert, cooperative, no distress, AAOx3                          Head:    Normocephalic, without " obvious abnormality, atraumatic                           Eyes:    PERRL, conjunctivae/corneas clear, EOM's intact, both eyes                         Throat:   Lips, tongue, gums normal; oral mucosa pink and moist                           Neck:   Supple, symmetrical, trachea midline, no JVD                         Lungs:    Clear to auscultation bilaterally, respirations unlabored                 Chest Wall:    No tenderness or deformity                          Heart:    Regular rate and rhythm, S1 and S2 normal                  Abdomen:   Soft mild right-sided tenderness                 extremities:   Extremities normal, atraumatic, no cyanosis or edema                        Pulses:   Pulses palpable in all extremities                            Skin:   Skin is warm and dry,  no rashes or palpable lesions                  Neurologic: Grossly nonfocal       Data Review:    I reviewed the patient's new clinical results.  Results from last 7 days   Lab Units 11/20/20  0945 11/19/20  1052   WBC 10*3/mm3 3.49 7.36   HEMOGLOBIN g/dL 6.7* 8.9*   PLATELETS 10*3/mm3 182 330     Results from last 7 days   Lab Units 11/20/20  0653 11/19/20  1052   SODIUM mmol/L 133* 132*   POTASSIUM mmol/L 4.2 3.4*   CHLORIDE mmol/L 104 94*   CO2 mmol/L 16.7* 24.6   BUN mg/dL 29* 34*   CREATININE mg/dL 3.27* 4.20*   CALCIUM mg/dL 7.7* 9.6   GLUCOSE mg/dL 357* 38*     Ct Abdomen Pelvis Without Contrast    Result Date: 11/20/2020  1. Evaluation is limited without contrast. There is a markedly distended urinary bladder demonstrating diverticula with mild to moderate transplant kidney hydronephrosis. Urinary catheterization is recommended. There is also cortical thinning of the transplant kidney. There is a perinephric fluid/phlegmonous collection as described above. Follow-up with dedicated sonogram of the transplant kidney is recommended. 2. There is a large amount of fat seen in the peripelvic region of the kidney as well as diffusely  within the pelvis and this may represent pelvic lipomatosis. Correlation with any prior cross-sectional imaging of the abdomen is recommended. A lobulated hypoattenuating lesion within the midabdomen is favored to represent the patient's displaced right ovary. The uterus morphology may be suggestive of an arcuate/bicornuate uterus. Again, correlation with prior cross-sectional imaging and/or follow-up is recommended.  Radiation dose reduction techniques were utilized, including automated exposure control and exposure modulation based on body size.       Xr Chest Ap    Result Date: 11/19/2020   No focal pulmonary consolidation. Borderline heart size. Follow-up as indications persist.    This report was finalized on 11/19/2020 12:49 PM by Dr. Carols Sheffield M.D.          Assessment:    Sepsis with acute renal failure without septic shock (CMS/HCC)  1-evolving sepsis secondary to  2-urinary tract infection with possible intra-abdominal process such as intra-abdominal abscess which could range from transplant kidney hydronephrosis with perinephric abscess versus hepatic abscess versus diverticular abscess abutting urinary bladder causing sympathetic pyuria-CT scan of the abdomen and pelvis is concerning for moderate transplanted kidney hydronephrosis with cortical thickening, markedly distended bladder demonstrating diverticula, cortical thinning of transplant and perinephric fluid/phlegmonous collection along the inferior lateral aspect of the transplanted kidney ranging 4 x 2 cm in size.  Other intra-abdominal processes are ruled out.  3-severe hyperglycemia  4-immunocompromised host  5-history of kidney transplant  6-acute on chronic renal failure  7-severe anemia        Recommendations/Discussions:  · Recommend catheterization and evacuation of bladder  · Urology consultation  · Repeat imaging of her bladder and kidney after catheterization to see hydronephrosis is resolved.  · Consideration for percutaneous  drainage of the perinephric collection around the transplant kidney is not unreasonable especially if the blood culture and urine culture are negative.  · Continue present antibiotics with goal to de-escalate based on evolving information blood cultures urine cultures and possible aspiration of perinephric fluid collection.  · We will discuss with Dr. Henley.    Cintia Damon MD  11/20/2020  11:18 EST    Much of this encounter note is an electronic transcription/translation of spoken language to printed text. The electronic translation of spoken language may permit erroneous, or at times, nonsensical words or phrases to be inadvertently transcribed; Although I have reviewed the note for such errors, some may still exist

## 2020-11-20 NOTE — CONSULTS
CONSULT NOTE    Infectious Diseases - Cintia Day MD  [facility]       Patient Identification:  Name: Kinga Taylor  Age: 50 y.o.  Sex: female  :  1970  MRN: [id number]             Date of Consultation: 2020      Primary Care Physician: Alfredo Martinez MD                               Requesting Physician: Dr. Ash  Reason for Consultation: Sepsis    Impression: Patient is a 50-year-old female with past medical history remarkable for history of kidney transplant in , on immunosuppressive therapy which was adjusted to 2- 3 months ago in which her Prograf was discontinued and Rapamune was started, has chronic kidney disease with baseline creatinine of 2.4 was in her usual state of her health until 3 - 4 days ago when she developed nausea vomiting diarrhea with mid abdominal pain and discomfort in the right lower back and pain.  This is associated with decreased appetite and has lost weight and complaining of being fatigued and weak over the past 2 months.  Work-up in the emergency room revealed abnormal urinalysis consistent with urinary tract infection along with hypoglycemia and lactate of 1.7.  Patient was noted to have low-grade temperature of 99.5 upon arrival.  She was noted to be profoundly hypoglycemic with worsening kidney function and was noted to be severely anemic.  Patient has been given IV fluids and was empirically started on IV vancomycin and cefepime and was provided with prednisone and Solu-Medrol for possible steroid induced adrenal insufficiency.  Patient is currently feeling better.  This presentation and above context is consistent with:  1-evolving sepsis secondary to  2-urinary tract infection with possible intra-abdominal process such as intra-abdominal abscess which could range from hepatic abscess versus diverticular abscess abutting urinary bladder causing sympathetic pyuria  3-severe hyperglycemia  4-immunocompromised host  5-history of kidney  transplant  6-acute on chronic renal failure  7-severe anemia      Recommendations/Discussions:  At this juncture I agree with your care plan consisting of empiric broad-spectrum antibiotics after blood cultures and urine cultures are taken.  Will de-escalate antibiotics based on the culture data response to treatment and any adverse effect that could occur from this combination to her organ system.  Given her chronicity of progressive weight loss and not feeling very well in the context of underlying ongoing use of immunosuppressive therapy I would recommend CT scan of the abdomen and pelvis without contrast to further evaluate intra-abdominal pathology that may require urgent intervention if found despite antibiotic therapy.  Supportive care for her other medical issues per primary team.  Thank you Dr. Henley for letting me be the part of your patient.  Please see above impression and recommendations      History of Present Illness:    Patient is a 50-year-old female with past medical history remarkable for history of kidney transplant in 1995, on immunosuppressive therapy which was adjusted to 2- 3 months ago in which her Prograf was discontinued and Rapamune was started, has chronic kidney disease with baseline creatinine of 2.4 was in her usual state of her health until 3 - 4 days ago when she developed nausea vomiting diarrhea with mid abdominal pain and discomfort in the right lower back and pain.  This is associated with decreased appetite and has lost weight and complaining of being fatigued and weak over the past 2 months.  Work-up in the emergency room revealed abnormal urinalysis consistent with urinary tract infection along with hypoglycemia and lactate of 1.7.  Patient was noted to have low-grade temperature of 99.5 upon arrival.  She was noted to be profoundly hypoglycemic with worsening kidney function and was noted to be severely anemic.  Patient has been given IV fluids and was empirically started  on IV vancomycin and cefepime and was provided with prednisone and Solu-Medrol for possible steroid induced adrenal insufficiency.  Patient is currently feeling better.       Past Medical History:  Past Medical History:   Diagnosis Date   • Acid reflux disease    • Bruises easily    • Depression    • Diabetes mellitus (CMS/HCC)    • Disease of thyroid gland     PARATHYROID   • End stage renal disease (CMS/HCC)    • Fistula, arteriovenous, acquired (CMS/HCC)     left arm   • Hiatal hernia    • Hip pain, left    • History of anesthesia complications     nightmares and has trouble waking up   • History of transfusion     UNSURE OF LAST.  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   • HIATAL HERNIA REPAIR      age 15   • HIP SURGERY      replacements bilt with core depressions    • JOINT REPLACEMENT Bilateral     HIP   • NEPHRECTOMY Right 09/1994   • NEPHROLITHOTOMY Right 01/05/1995    TRANSPLANT   • REIMPLANT URETER IN BLADDER     • STOMACH SURGERY      area near transplant due to necrosis area removed   • TONSILLECTOMY     • TOTAL HIP ARTHROPLASTY REVISION Right    • TOTAL HIP ARTHROPLASTY REVISION Left 6/28/2017    Procedure: LT TOTAL HIP ARTHROPLASTY REVISION;  Surgeon: Wil Curtis MD;  Location: [facility];  Service:       Home Meds:  Medications Prior to Admission   Medication Sig Dispense Refill Last Dose   • alendronate (FOSAMAX) 70 MG tablet Take 70 mg by mouth Every 7 (Seven) Days. Patient takes on Wednesdays 11/18/2020 at  Unknown time   • allopurinol (ZYLOPRIM) 300 MG tablet Take 300 mg by mouth Every Night.   11/18/2020 at Unknown time   • atenolol (TENORMIN) 100 MG tablet Take 100 mg by mouth Every Night.   11/18/2020 at Unknown time   • atorvastatin (LIPITOR) 40 MG tablet Take 40 mg by mouth Every Night.   11/18/2020 at Unknown time   • buPROPion XL (WELLBUTRIN XL) 300 MG 24 hr tablet Take 300 mg by mouth Daily.   11/18/2020 at Unknown time   • glyburide (DIAbeta) 2.5 MG tablet Take 2.5 mg by mouth Every Morning.   11/18/2020 at Unknown time   • hydrOXYzine (ATARAX) 25 MG tablet Take 25 mg by mouth Every Night.   11/18/2020 at Unknown time   • omeprazole (priLOSEC) 40 MG capsule Take 40 mg by mouth Every Morning.   11/18/2020 at Unknown time   • paricalcitol (ZEMPLAR) 2 MCG capsule Take 2 mg by mouth Every Night.   11/18/2020 at Unknown time   • predniSONE (DELTASONE) 5 MG tablet Take 5 mg by mouth Every Night.   11/18/2020 at Unknown time   • sirolimus (Rapamune) 2 MG tablet Take 2 mg by mouth Daily.   11/18/2020 at Unknown time   • sodium bicarbonate 650 MG tablet Take 1,300 mg by mouth 2 (Two) Times a Day.   11/18/2020 at Unknown time     Current Meds:     Current Facility-Administered Medications:   •  acetaminophen (TYLENOL) tablet 650 mg, 650 mg, Oral, Q4H PRN, Lázaro Ash MD  •  allopurinol (ZYLOPRIM) tablet 100 mg, 100 mg, Oral, Nightly, Lázaro Ash MD  •  atenolol (TENORMIN) tablet 50 mg, 50 mg, Oral, Q24H, Lázaro Ash MD  •  atorvastatin (LIPITOR) tablet 10 mg, 10 mg, Oral, Nightly, Lázaro Ash MD  •  buPROPion XL (WELLBUTRIN XL) 24 hr tablet 300 mg, 300 mg, Oral, Daily, Lázaro Ash MD  •  [START ON 11/20/2020] cefepime (MAXIPIME) 2 g/100 mL 0.9% NS (mbp), 2 g, Intravenous, Q24H, Lázaro Ash MD  •  dextrose 5 % and sodium chloride 0.9 % infusion, 75 mL/hr, Intravenous, Continuous, Lázaro Ash MD, Last Rate: 75 mL/hr at 11/19/20 1842, 75 mL/hr at 11/19/20 1842  •  famotidine (PEPCID) tablet 20 mg, 20 mg,  Oral, Daily, Lázaro Ash MD, 20 mg at 11/19/20 1842  •  insulin lispro (humaLOG) injection 0-7 Units, 0-7 Units, Subcutaneous, TID AC, Lázaro Ash MD  •  ondansetron (ZOFRAN) injection 4 mg, 4 mg, Intravenous, Q6H PRN, Lázaro Ash MD, 4 mg at 11/19/20 1842  •  ondansetron ODT (ZOFRAN-ODT) disintegrating tablet 4 mg, 4 mg, Oral, Q6H PRN, Lázaro Ash MD  •  paricalcitol (ZEMPLAR) capsule 2 mcg, 2 mcg, Oral, Nightly, Lázaro Ash MD  •  Pharmacy to dose vancomycin, , Does not apply, Continuous PRN, Lázaro Ash MD  •  potassium chloride (K-DUR,KLOR-CON) ER tablet 20 mEq, 20 mEq, Oral, Once, Lázaro Ash MD  •  predniSONE (DELTASONE) tablet 5 mg, 5 mg, Oral, Nightly, Lázaro Ash MD  •  sirolimus (RAPAMUNE) tablet 2 mg, 2 mg, Oral, Daily, Lázaro Ash MD  •  sodium bicarbonate tablet 1,300 mg, 1,300 mg, Oral, TID, Lázaro Ash MD  •  [COMPLETED] Insert peripheral IV, , , Once **AND** sodium chloride 0.9 % flush 10 mL, 10 mL, Intravenous, PRN, Rachelle Han, APRN, 10 mL at 11/19/20 1443  •  Vancomycin Pharmacy Intermittent Dosing, , Does not apply, Daily, Lázaro Ash MD  Allergies:  Allergies   Allergen Reactions   • Aldomet [Methyldopa] Swelling     FACIAL SWELLING AND HIVES   • Dilaudid [Hydromorphone Hcl] Itching   • Morphine And Related Itching     Social History:   Social History     Tobacco Use   • Smoking status: Never Smoker   • Smokeless tobacco: Never Used   Substance Use Topics   • Alcohol use: No      Family History:  Family History   Problem Relation Age of Onset   • Cancer Mother    • Stroke Maternal Grandmother    • Malig Hyperthermia Neg Hx           Review of Systems  See history of present illness and past medical history.    Constitutional: Remarkable no fever chills feeling poorly progressive fatigue worse in the last few days but has been building up over the course of few months.  Cardiovascular: Remarkable for no cough congestion or shortness of breath  GI: Remarkable for  "abdominal discomfort decreased appetite and weight loss.  : Remarkable for no burning urination does have frequency and urgency and abdominal discomfort and flank pain.  Musculoskeletal: Remarkable for no specific joint aches and pain with generalized discomfort  Neurological: Remarkable for no loss of consciousness or continence but significantly weak.    Remainder of ROS is negative.      Vitals:   /58 (BP Location: Right arm, Patient Position: Lying)   Pulse 99   Temp 100.2 °F (37.9 °C) (Oral)   Resp 16   Ht 160 cm (63\")   Wt 60.3 kg (133 lb)   LMP 11/19/2020   SpO2 94%   BMI 23.56 kg/m²   I/O:     Intake/Output Summary (Last 24 hours) at 11/19/2020 2043  Last data filed at 11/19/2020 1706  Gross per 24 hour   Intake 0 ml   Output --   Net 0 ml     Exam:  Patient is examined using the personal protective equipment as per guidelines from infection control for this particular patient as enacted.  Hand washing was performed before and after patient interaction.  General Appearance:    Alert, cooperative, no distress, appears stated age, does not appear to be toxic but appears ill.   Head:    Normocephalic, without obvious abnormality, atraumatic   Eyes:    PERRL, conjunctivae/corneas clear, EOM's intact, both eyes   Ears:    Normal external ear canals, both ears   Nose:   Nares normal, septum midline, mucosa normal, no drainage    or sinus tenderness   Throat:   Lips, tongue, gums normal; oral mucosa pink and moist   Neck:   Supple, symmetrical, trachea midline, no adenopathy;     thyroid:  no enlargement/tenderness/nodules; no carotid    bruit or JVD   Back:     Symmetric, no curvature, ROM normal, no CVA tenderness   Lungs:     Clear to auscultation bilaterally, respirations unlabored   Chest Wall:    No tenderness or deformity    Heart:    Regular rate and rhythm, S1 and S2 normal, no murmur, rub   or gallop   Abdomen:    Soft no guarding or rigidity noted mild generalized tenderness noted "   Extremities:   Extremities normal, atraumatic, no cyanosis or edema   Pulses:   Pulses palpable in all extremities; symmetric all extremities   Skin:   Skin color normal, Skin is warm and dry,  no rashes or palpable lesions   Neurologic:  Grossly nonfocal       Data Review:    I reviewed the patient's new clinical results.  Results from last 7 days   Lab Units 11/19/20  1052   WBC 10*3/mm3 7.36   HEMOGLOBIN g/dL 8.9*   PLATELETS 10*3/mm3 330     Results from last 7 days   Lab Units 11/19/20  1052   SODIUM mmol/L 132*   POTASSIUM mmol/L 3.4*   CHLORIDE mmol/L 94*   CO2 mmol/L 24.6   BUN mg/dL 34*   CREATININE mg/dL 4.20*   CALCIUM mg/dL 9.6   GLUCOSE mg/dL 38*     Microbiology Results (last 10 days)     Procedure Component Value - Date/Time    Respiratory Panel PCR w/COVID-19(SARS-CoV-2) WEN/KATINA/JORGE A/PAD/COR/MAD/ANGELICA In-House, NP Swab in UTM/VTM, 3-4 HR TAT - Swab, Nasopharynx [[id number]]  (Normal) Collected: 11/19/20 1206    Lab Status: Final result Specimen: Swab from Nasopharynx Updated: 11/19/20 1418     ADENOVIRUS, PCR Not Detected     Coronavirus 229E Not Detected     Coronavirus HKU1 Not Detected     Coronavirus NL63 Not Detected     Coronavirus OC43 Not Detected     COVID19 Not Detected     Human Metapneumovirus Not Detected     Human Rhinovirus/Enterovirus Not Detected     Influenza A PCR Not Detected     Influenza B PCR Not Detected     Parainfluenza Virus 1 Not Detected     Parainfluenza Virus 2 Not Detected     Parainfluenza Virus 3 Not Detected     Parainfluenza Virus 4 Not Detected     RSV, PCR Not Detected     Bordetella pertussis pcr Not Detected     Bordetella parapertussis PCR Not Detected     Chlamydophila pneumoniae PCR Not Detected     Mycoplasma pneumo by PCR Not Detected    Narrative:      Fact sheet for providers: https://docs.[employer]/wp-content/uploads/VED1484-4307-NF6.1-EUA-Provider-Fact-Sheet-3.pdf    Fact sheet for patients:  https://docs.[employer]/wp-content/uploads/UGV2600-2751-EQ8.1-EUA-Patient-Fact-Sheet-1.pdf            Assessment:  Active Hospital Problems    Diagnosis  POA   • Sepsis with acute renal failure without septic shock (CMS/HCC) [A41.9, R65.20, N17.9]  Yes      Resolved Hospital Problems   No resolved problems to display.         Plan:  See above  Cintia Damon MD   11/19/2020  20:43 EST    Much of this encounter note is an electronic transcription/translation of spoken language to printed text. The electronic translation of spoken language may permit erroneous, or at times, nonsensical words or phrases to be inadvertently transcribed; Although I have reviewed the note for such errors, some may still exist

## 2020-11-20 NOTE — PAYOR COMM NOTE
"Titus Barrett (50 y.o. Female)                     ATTENTION;     INITIAL CLINICAL FOR REVIEW ,    AUTH PENDING [id number]                REPLY TO [facility] DEPT , JENNIE PITTS LPN  [phone] OR CALL         Date of Birth Social Security Number Address Home Phone MRN    1970  [id number] [facility] [phone] [id number]    [employer] Marital Status          Restoration        Admission Date Admission Type Admitting Provider Attending Provider Department, Room/Bed    11/19/20 Emergency Lázaro Ash MD Ahmed, Aftab, MD [address], [address]    Discharge Date Discharge Disposition Discharge Destination                       Attending Provider: Lázaro Ash MD    Allergies: Aldomet [Methyldopa], Dilaudid [Hydromorphone Hcl], Morphine And Related    Isolation: None   Infection: None   Code Status: CPR    Ht: 160 cm (62.99\")   Wt: 60.3 kg (133 lb)    Admission Cmt: None   Principal Problem: None                Active Insurance as of 11/19/2020     Primary Coverage     Payor Plan Insurance Group Employer/Plan Group    [employer]HEM BLUE CROSS ANTHEM BLUE CROSS BLUE SHIELD PPO [id number]     Payor Plan Address Payor Plan Phone Number Payor Plan Fax Number Effective Dates    [address] [phone]  1/1/2015 - None Entered    [facility] [id number]       Subscriber Name Subscriber Birth Date Member ID       TITUS BARRETT 1970 [id number]                 Emergency Contacts      (Rel.) Home Phone Work Phone Mobile Phone    Barrett,Charles (Spouse) [phone] -- [phone]    BarrettMaegan ramirez (Daughter) -- -- [phone]    Azeb Molina (Sister) -- -- --               History & Physical      Lázaro Ash MD at 11/19/20 2233          History and physical    Primary care physician  Dr. Martinez    Chief complaint  Fever chills  Nausea vomiting diarrhea    History of present illness  50-year-old white female with history of status " post kidney transplant diabetes mellitus hypertension hyperlipidemia and chronic kidney disease stage IV presented to [facility] emergency room with fever chills abdominal pain nausea vomiting diarrhea started Monday.  Patient has no appetite for several months.  Patient has lost 40 pounds in 3 months.  Patient denies any chest pain increased shortness of breath.  Patient work-up in ER revealed sepsis with acute kidney injury and also found to be hypocalcemic.  Patient admitted for management.  At the time of interview she is in no distress and wants to eat.     PAST MEDICAL HISTORY  • Acid reflux disease     • Bruises easily     • Depression     • Diabetes mellitus (CMS/HCC)     • Disease of thyroid gland     • End stage renal disease (CMS/HCC)     • Fistula, arteriovenous, acquired (CMS/HCC)     • Hiatal hernia     • Hip pain, left     • History of anesthesia complications     • History of transfusion     • Hyperlipemia     • Hypertension     • Kidney transplant recipient     • Occasional tremors     • Osteoporosis     • PONV (postoperative nausea and vomiting)     • Urinary retention     • UTI (urinary tract infection)        PAST SURGICAL HISTORY              Procedure Laterality Date   • APPENDECTOMY       • ARTERIOVENOUS FISTULA Left 09/1994   • BLADDER REPAIR       • COLONOSCOPY       • GYNECOLOGIC CRYOSURGERY         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   • HIATAL HERNIA REPAIR         age 15   • HIP SURGERY         replacements bilt with core depressions    • JOINT REPLACEMENT Bilateral       HIP   • NEPHRECTOMY Right 09/1994   • NEPHROLITHOTOMY Right 01/05/1995     TRANSPLANT   • REIMPLANT URETER IN BLADDER       • STOMACH SURGERY         area near transplant due to necrosis area removed   • TONSILLECTOMY       • TOTAL HIP ARTHROPLASTY REVISION Right     • TOTAL HIP ARTHROPLASTY REVISION Left 6/28/2017     Procedure: LT TOTAL HIP ARTHROPLASTY REVISION;  Surgeon: Wil Curtis,  "MD;  Location: [facility] OR;  Service:         FAMILY HISTORY           Problem Relation Age of Onset   • Cancer Mother     • Stroke Maternal Grandmother     • Malig Hyperthermia Neg Hx        SOCIAL HISTORY                 Socioeconomic History   • Marital status:        Spouse name: Not on file   • Number of children: Not on file   • Years of education: Not on file   • Highest education level: Not on file   Tobacco Use   • Smoking status: Never Smoker   • Smokeless tobacco: Never Used   Substance and Sexual Activity   • Alcohol use: No   • Drug use: No   • Sexual activity: Defer        ALLERGIES  Aldomet [methyldopa], Dilaudid [hydromorphone hcl], and Morphine and related  Home medications reviewed     REVIEW OF SYSTEMS  All systems reviewed and negative except for those discussed in HPI.      PHYSICAL EXAM  Blood pressure 134/80, pulse 98, temperature 100 °F (37.8 °C), temperature source Oral, resp. rate 16, height 160 cm (63\"), weight 60.3 kg (133 lb), last menstrual period 11/19/2020, SpO2 100 %.    GENERAL: Appears older than stated age, non-toxic appearing, not distressed  HENT: normocephalic, atraumatic  EYES: no scleral icterus, PERRL, pale sclera  CV: regular rhythm, regular rate, no murmur  RESPIRATORY: normal effort, CTAB  ABDOMEN: soft, normal bowel sounds, nontender  MUSCULOSKELETAL: no deformity  NEURO: alert, moves all extremities, follows commands, mental status normal/baseline  SKIN: warm, dry, no rash   Psych: Appropriate mood and affect    LAB RESULTS  Lab Results (last 24 hours)     Procedure Component Value Units Date/Time    POC Glucose Once [[id number]]  (Abnormal) Collected: 11/19/20 1623    Specimen: Blood Updated: 11/19/20 1624     Glucose 59 mg/dL     Urine Culture - Urine, Urine, Clean Catch [[id number]] Collected: 11/19/20 1200    Specimen: Urine, Clean Catch Updated: 11/19/20 1526    POC Glucose Once [[id number]]  (Normal) Collected: 11/19/20 1438    Specimen: Blood Updated: " 11/19/20 1438     Glucose 96 mg/dL     Respiratory Panel PCR w/COVID-19(SARS-CoV-2) WEN/KATINA/JORGE A/PAD/COR/MAD/ANGELICA In-House, NP Swab in UTM/VTM, 3-4 HR TAT - Swab, Nasopharynx [[id number]]  (Normal) Collected: 11/19/20 1206    Specimen: Swab from Nasopharynx Updated: 11/19/20 1418     ADENOVIRUS, PCR Not Detected     Coronavirus 229E Not Detected     Coronavirus HKU1 Not Detected     Coronavirus NL63 Not Detected     Coronavirus OC43 Not Detected     COVID19 Not Detected     Human Metapneumovirus Not Detected     Human Rhinovirus/Enterovirus Not Detected     Influenza A PCR Not Detected     Influenza B PCR Not Detected     Parainfluenza Virus 1 Not Detected     Parainfluenza Virus 2 Not Detected     Parainfluenza Virus 3 Not Detected     Parainfluenza Virus 4 Not Detected     RSV, PCR Not Detected     Bordetella pertussis pcr Not Detected     Bordetella parapertussis PCR Not Detected     Chlamydophila pneumoniae PCR Not Detected     Mycoplasma pneumo by PCR Not Detected    Narrative:      Fact sheet for providers: https://[employer]za/wp-content/uploads/SSR7354-9181-ZR9.1-EUA-Provider-Fact-Sheet-3.pdf    Fact sheet for patients: https://[employer]za/wp-content/uploads/PFX9576-9284-XJ5.1-EUA-Patient-Fact-Sheet-1.pdf    Blood Culture - Blood, Arm, Right [[id number]] Collected: 11/19/20 1347    Specimen: Blood from Arm, Right Updated: 11/19/20 1400    Blood Culture - Blood, Hand, Right [[id number]] Collected: 11/19/20 1347    Specimen: Blood from Hand, Right Updated: 11/19/20 1400    CBC & Differential [[id number]]  (Abnormal) Collected: 11/19/20 1052    Specimen: Blood Updated: 11/19/20 1356    Narrative:      The following orders were created for panel order CBC & Differential.  Procedure                               Abnormality         Status                     ---------                               -----------         ------                     CBC Auto Differential[[id number]]        Abnormal             "Final result                 Please view results for these tests on the individual orders.    CBC Auto Differential [[id number]]  (Abnormal) Collected: 11/19/20 1052    Specimen: Blood Updated: 11/19/20 1356     WBC 7.36 10*3/mm3      RBC 3.38 10*6/mm3      Hemoglobin 8.9 g/dL      Hematocrit 27.4 %      MCV 81.1 fL      MCH 26.3 pg      MCHC 32.5 g/dL      RDW 15.6 %      RDW-SD 45.1 fl      MPV 8.8 fL      Platelets 330 10*3/mm3      Neutrophil % 67.8 %      Lymphocyte % 21.3 %      Monocyte % 8.7 %      Eosinophil % 0.8 %      Basophil % 0.3 %      Neutrophils, Absolute 4.99 10*3/mm3      Lymphocytes, Absolute 1.57 10*3/mm3      Monocytes, Absolute 0.64 10*3/mm3      Eosinophils, Absolute 0.06 10*3/mm3      Basophils, Absolute 0.02 10*3/mm3     Scan Slide [[id number]] Collected: 11/19/20 1052    Specimen: Blood Updated: 11/19/20 1356     Ovalocytes Slight/1+     Spherocytes Slight/1+     WBC Morphology Normal     Platelet Morphology Normal    Procalcitonin [[id number]]  (Abnormal) Collected: 11/19/20 1052    Specimen: Blood Updated: 11/19/20 1353     Procalcitonin 0.35 ng/mL     Narrative:      As a Marker for Sepsis (Non-Neonates):   1. <0.5 ng/mL represents a low risk of severe sepsis and/or septic shock.  1. >2 ng/mL represents a high risk of severe sepsis and/or septic shock.    As a Marker for Lower Respiratory Tract Infections that require antibiotic therapy:  PCT on Admission     Antibiotic Therapy             6-12 Hrs later  > 0.5                Strongly Recommended            >0.25 - <0.5         Recommended  0.1 - 0.25           Discouraged                   Remeasure/reassess PCT  <0.1                 Strongly Discouraged          Remeasure/reassess PCT      As 28 day mortality risk marker: \"Change in Procalcitonin Result\" (> 80 % or <=80 %) if Day 0 (or Day 1) and Day 4 values are available. Refer to http://www.[facility]-pct-calculator.com/   Change in PCT <=80 %   A decrease of PCT levels below or equal to " 80 % defines a positive change in PCT test result representing a higher risk for 28-day all-cause mortality of patients diagnosed with severe sepsis or septic shock.  Change in PCT > 80 %   A decrease of PCT levels of more than 80 % defines a negative change in PCT result representing a lower risk for 28-day all-cause mortality of patients diagnosed with severe sepsis or septic shock.                Results may be falsely decreased if patient taking Biotin.     POC Glucose Once [[id number]]  (Abnormal) Collected: 11/19/20 1324    Specimen: Blood Updated: 11/19/20 1326     Glucose 166 mg/dL     POC Glucose Once [[id number]]  (Abnormal) Collected: 11/19/20 1239    Specimen: Blood Updated: 11/19/20 1245     Glucose 30 mg/dL     Urinalysis, Microscopic Only - Urine, Clean Catch [[id number]]  (Abnormal) Collected: 11/19/20 1200    Specimen: Urine, Clean Catch Updated: 11/19/20 1239     RBC, UA 0-2 /HPF      WBC, UA 31-50 /HPF      Bacteria, UA None Seen /HPF      Squamous Epithelial Cells, UA 0-2 /HPF      Hyaline Casts, UA 3-6 /LPF      Methodology Automated Microscopy    POC Glucose Once [[id number]]  (Normal) Collected: 11/19/20 1236    Specimen: Blood Updated: 11/19/20 1238     Glucose 105 mg/dL     Urinalysis With Microscopic If Indicated (No Culture) - Urine, Clean Catch [[id number]]  (Abnormal) Collected: 11/19/20 1200    Specimen: Urine, Clean Catch Updated: 11/19/20 1237     Color, UA Yellow     Appearance, UA Cloudy     pH, UA 7.5     Specific Gravity, UA 1.006     Glucose, UA Negative     Ketones, UA Negative     Bilirubin, UA Negative     Blood, UA Small (1+)     Protein,  mg/dL (2+)     Leuk Esterase, UA Large (3+)     Nitrite, UA Negative     Urobilinogen, UA 0.2 E.U./dL    Lactic Acid, Plasma [[id number]]  (Normal) Collected: 11/19/20 1203    Specimen: Blood Updated: 11/19/20 1233     Lactate 1.7 mmol/L     Comprehensive Metabolic Panel [[id number]]  (Abnormal) Collected: 11/19/20 1052    Specimen: Blood  Updated: 11/19/20 1230     Glucose 38 mg/dL      BUN 34 mg/dL      Creatinine 4.20 mg/dL      Sodium 132 mmol/L      Potassium 3.4 mmol/L      Chloride 94 mmol/L      CO2 24.6 mmol/L      Calcium 9.6 mg/dL      Total Protein 8.1 g/dL      Albumin 3.50 g/dL      ALT (SGPT) 11 U/L      AST (SGOT) 28 U/L      Alkaline Phosphatase 112 U/L      Total Bilirubin 0.4 mg/dL      eGFR Non African Amer 11 mL/min/1.73      Comment: <15 Indicative of kidney failure.        eGFR   Amer --     Comment: <15 Indicative of kidney failure.        Globulin 4.6 gm/dL      A/G Ratio 0.8 g/dL      BUN/Creatinine Ratio 8.1     Anion Gap 13.4 mmol/L     Narrative:      GFR Normal >60  Chronic Kidney Disease <60  Kidney Failure <15      hCG, Serum, Qualitative [[id number]]  (Normal) Collected: 11/19/20 1052    Specimen: Blood Updated: 11/19/20 1208     HCG Qualitative Negative    [place] Draw [[id number]] Collected: 11/19/20 1052    Specimen: Blood Updated: 11/19/20 1200    Narrative:      The following orders were created for panel order [place] Draw.  Procedure                               Abnormality         Status                     ---------                               -----------         ------                     Light Blue Top[[id number]]                                   Final result               Green Top (Gel)[[id number]]                                  Final result               Lavender Top[[id number]]                                     Final result               Gold Top - SST[[id number]]                                   Final result                 Please view results for these tests on the individual orders.    Light Blue Top [[id number]] Collected: 11/19/20 1052    Specimen: Blood Updated: 11/19/20 1200     Extra Tube hold for add-on     Comment: Auto resulted       Green Top (Gel) [[id number]] Collected: 11/19/20 1052    Specimen: Blood Updated: 11/19/20 1200     Extra Tube Hold for add-ons.     Comment: Auto  resulted.       Lavender Top [[id number]] Collected: 11/19/20 1052    Specimen: Blood Updated: 11/19/20 1200     Extra Tube hold for add-on     Comment: Auto resulted       Gold Top - SST [[id number]] Collected: 11/19/20 1052    Specimen: Blood Updated: 11/19/20 1200     Extra Tube Hold for add-ons.     Comment: Auto resulted.           Imaging Results (Last 24 Hours)     Procedure Component Value Units Date/Time    XR Chest AP [[id number]] Collected: 11/19/20 1247     Updated: 11/19/20 1252    Narrative:      XR CHEST AP-     INDICATIONS: Cough and fever, possible Covid 19     TECHNIQUE: Frontal view of the chest     COMPARISON: 05/27/2017     FINDINGS:     The heart size is borderline. Pulmonary vasculature is unremarkable. No  focal pulmonary consolidation, pleural effusion, or pneumothorax. No  acute osseous process.       Impression:         No focal pulmonary consolidation. Borderline heart size. Follow-up as  indications persist.           This report was finalized on 11/19/2020 12:49 PM by Dr. Carlos Sheffield M.D.             Current Facility-Administered Medications:   •  allopurinol (ZYLOPRIM) tablet 100 mg, 100 mg, Oral, Nightly, Lázaro Ash MD  •  atenolol (TENORMIN) tablet 50 mg, 50 mg, Oral, Q24H, Lázaro Ash MD  •  atorvastatin (LIPITOR) tablet 10 mg, 10 mg, Oral, Nightly, Lázaro Ash MD  •  buPROPion XL (WELLBUTRIN XL) 24 hr tablet 300 mg, 300 mg, Oral, Daily, Lázaro Ash MD  •  [START ON 11/20/2020] cefepime (MAXIPIME) 2 g/100 mL 0.9% NS (mbp), 2 g, Intravenous, Q24H, Lázaro Ash MD  •  dextrose 5 % and sodium chloride 0.9 % infusion, 75 mL/hr, Intravenous, Continuous, Lázaro Ash MD  •  ondansetron (ZOFRAN) injection 4 mg, 4 mg, Intravenous, Q6H PRN, Lázaro Ash MD  •  paricalcitol (ZEMPLAR) capsule 2 mcg, 2 mcg, Oral, Nightly, Lázaro Ash MD  •  Pharmacy to dose vancomycin, , Does not apply, Continuous PRN, Lázaro Ash MD  •  predniSONE (DELTASONE) tablet 5 mg, 5 mg, Oral,  Nightly, Kalyani Ash MD  •  sirolimus (RAPAMUNE) tablet 2 mg, 2 mg, Oral, Daily, Kalyani Ash MD  •  sodium bicarbonate tablet 1,300 mg, 1,300 mg, Oral, TID, Kalyani Ash MD  •  [COMPLETED] Insert peripheral IV, , , Once **AND** sodium chloride 0.9 % flush 10 mL, 10 mL, Intravenous, PRN, Rachelle Han APRN, 10 mL at 11/19/20 1443  •  vancomycin 1250 mg/250 mL 0.9% NS IVPB ([facility]), 20 mg/kg, Intravenous, Once, Rachelle Han APRJULIUS     ASSESSMENT  Acute kidney injury  Chronic kidney disease stage IV  Status post kidney transplant in 1995  Sepsis source not clear  Diabetes mellitus with low blood sugar  Hypertension  Hyperlipidemia  Gastroesophageal reflux disease    PLAN  Admit  IV fluids  IV antibiotics after obtaining the cultures  Nephrology consult  Infectious disease to follow patient  Adjust home medications  Stress ulcer DVT prophylaxis  Supportive care  Patient is full code  Discussed with nursing staff  Follow closely further recommendation current hospital course    KALYANI ASH MD      Electronically signed by Kalyani Ash MD at 11/19/20 1700          Emergency Department Notes      Anh Donaldson, RN at 11/19/20 1016        Pt reports for several days N/V/D with fever highest 101, pt states she is a kidney transplant patient.     Patient was placed in face mask during first look triage.  Patient was wearing a face mask throughout encounter.  I wore personal protective equipment throughout the encounter.  Hand hygiene was performed before and after patient encounter.        Anh Donaldson RN  11/19/20 1017      Electronically signed by Anh Donaldson RN at 11/19/20 1017     Lou Mitchell, RN at 11/19/20 1039        Pt states that for the last 3/4 days she has had N/V/D with mild abdominal pain in the RLQ and the right flank. States that over the last few months she has had a decrease in appetite and weight loss. Pt reports fatigue and weakness. Pt masked and this RN wore all  appropriate PPE.      Lou Mitchell, RN  11/19/20 1040      Electronically signed by Lou Mitchell RN at 11/19/20 1040     Emma Tello PCT at 11/19/20 1153        Pt wearing mask. Myself had mask, and eye wear/PPE when present with pt.     Emma Tello PCT  11/19/20 1153      Electronically signed by Emma Tello PCT at 11/19/20 1153     Sara Renteria MD at 11/19/20 1156        The MANSI and I have discussed this patient's history, physical exam, and treatment plan. I have reviewed the documentation and personally had a face to face interaction with the patient  I affirm the documentation and agree with the treatment and plan.  The following describes my personal findings.    The patient presents brought from home by her father with report of intermittent fever to 101, vomiting and diarrhea for 2 days, significant weight loss during the past couple of months.  Patient is status post kidney transplant in 1995 and reports she was taken off Prograf and put on Rapamune approximately 2 to 3 months ago by Dr. Adama Plunkett at which time he had noted that her she had elevated creatinine at 2.4.  Patient denies cough, chest pain, shortness of air does report some mild right lower quadrant abdominal discomfort and reports she has had decreased urine output over the past few days  Patient denies rash, wounds    Limited physical exam:  Patient is pale, alert, oriented, ill-appearing  Lungs/cardiovascular: Good air movement bilaterally, no rails, no tachypnea  Abdomen: Soft, mild tenderness to palpation right lower quadrant without guarding or rebound  Back/extremities: Posterior tibial pulses intact bilateral lower extremities without edema    Patient with history of kidney transplant in 1995, hypertension, diabetes    Anticipate admission for IV resuscitation, further testing, treatment as needed    Patient was wearing facemask when I entered the room and throughout our encounter. Full protective equipment was worn  throughout this patient encounter including a face mask, eye protection and gloves. Hand hygiene was performed before donning protective equipment and after removal when leaving the room.           Sara Renteria MD  11/19/20 2021      Electronically signed by Sara Renteria MD at 11/19/20 2021     Rachelle Han APRN at 11/19/20 1213      Procedure Orders    1. Critical Care [[id number]] ordered by Rachelle Han APRN          Attestation signed by Sara Renteria MD at 11/19/20 2021    MD Attestation Note    I supervised care provided by the midlevel provider.    The MANSI and I have discussed this patient's history, physical exam, and treatment plan. I have reviewed the documentation and personally had a face to face interaction with the patient  I affirm the documentation and agree with the treatment and plan.   My personal findings are documented in a separate note.                    EMERGENCY DEPARTMENT ENCOUNTER    Room Number:  02/02  Date of encounter:  11/19/2020  PCP: Alfredo Martinez MD  Historian: Patient  Nephrologist: Adama Plunkett    PPE    Patient was placed in face mask in first look. Patient was wearing facemask when I entered the room and throughout our encounter. I wore full protective equipment throughout this patient encounter including a face mask, and gloves. Hand hygiene was performed before donning protective equipment and after removal when leaving the room.        HPI:  Chief Complaint: Fever and nausea  A complete HPI/ROS/PMH/PSH/SH/FH are unobtainable due to: Nothing    Context: Kinga Taylor is a 50 y.o. female who arrives to the ED via private vehicle from home with her father.  Patient presents with c/o intermittent, mild to moderate fever that began on Monday.   Patient also complains of vomiting and diarrhea that began yesterday, no appetite for several months.  Patient states that she has had approximately 40 pound weight loss in the last 3 months.  Patient  denies chest pain, cough, dysuria, headache, dizziness.  Patient states that nothing makes the symptoms better and nothing worsens symptoms.  Patient states that she had a kidney transplant in 1995, also has hypertension, diabetes.  She states that she started feeling bad on Monday and is just progressively started to feel weaker and with no appetite.  Patient states that she lives with her  and 6-year-old son and denies any close contact with anyone that has been Covid positive.        PAST MEDICAL HISTORY  Active Ambulatory Problems     Diagnosis Date Noted   • Difficulty walking 06/28/2017   • Anemia due to stage 3 chronic kidney disease treated with erythropoietin 06/27/2019     Resolved Ambulatory Problems     Diagnosis Date Noted   • No Resolved Ambulatory Problems     Past Medical History:   Diagnosis Date   • Acid reflux disease    • Bruises easily    • Depression    • Diabetes mellitus (CMS/HCC)    • Disease of thyroid gland    • End stage renal disease (CMS/HCC)    • Fistula, arteriovenous, acquired (CMS/HCC)    • Hiatal hernia    • Hip pain, left    • History of anesthesia complications    • History of transfusion    • Hyperlipemia    • Hypertension    • Kidney transplant recipient    • Occasional tremors    • Osteoporosis    • PONV (postoperative nausea and vomiting)    • Urinary retention    • UTI (urinary tract infection)          PAST SURGICAL HISTORY  Past Surgical History:   Procedure Laterality Date   • APPENDECTOMY     • ARTERIOVENOUS FISTULA Left 09/1994   • BLADDER REPAIR     • COLONOSCOPY     • GYNECOLOGIC CRYOSURGERY      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   • HIATAL HERNIA REPAIR      age 15   • HIP SURGERY      replacements bilt with core depressions    • JOINT REPLACEMENT Bilateral     HIP   • NEPHRECTOMY Right 09/1994   • NEPHROLITHOTOMY Right 01/05/1995    TRANSPLANT   • REIMPLANT URETER IN BLADDER     • STOMACH SURGERY      area near transplant due to necrosis area  removed   • TONSILLECTOMY     • TOTAL HIP ARTHROPLASTY REVISION Right    • TOTAL HIP ARTHROPLASTY REVISION Left 6/28/2017    Procedure: LT TOTAL HIP ARTHROPLASTY REVISION;  Surgeon: Wil Curtis MD;  Location: [facility] MAIN OR;  Service:          FAMILY HISTORY  Family History   Problem Relation Age of Onset   • Cancer Mother    • Stroke Maternal Grandmother    • Malig Hyperthermia Neg Hx          SOCIAL HISTORY  Social History     Socioeconomic History   • Marital status:      Spouse name: Not on file   • Number of children: Not on file   • Years of education: Not on file   • Highest education level: Not on file   Tobacco Use   • Smoking status: Never Smoker   • Smokeless tobacco: Never Used   Substance and Sexual Activity   • Alcohol use: No   • Drug use: No   • Sexual activity: Defer         ALLERGIES  Aldomet [methyldopa], Dilaudid [hydromorphone hcl], and Morphine and related        REVIEW OF SYSTEMS  Review of Systems     All systems reviewed and negative except for those discussed in HPI.        PHYSICAL EXAM    ED Triage Vitals   Temp Heart Rate Resp BP SpO2   11/19/20 1017 11/19/20 1017 11/19/20 1017 11/19/20 1037 11/19/20 1017   99.5 °F (37.5 °C) 89 20 129/78 96 %       Physical Exam  GENERAL: Appears older than stated age, non-toxic appearing, not distressed  HENT: normocephalic, atraumatic  EYES: no scleral icterus, PERRL, pale sclera  CV: regular rhythm, regular rate, no murmur  RESPIRATORY: normal effort, CTAB  ABDOMEN: soft, normal bowel sounds, nontender  MUSCULOSKELETAL: no deformity  NEURO: alert, moves all extremities, follows commands, mental status normal/baseline  SKIN: warm, dry, no rash   Psych: Appropriate mood and affect  Nursing notes and vital signs reviewed      LAB RESULTS  Recent Results (from the past 24 hour(s))   Light Blue Top    Collection Time: 11/19/20 10:52 AM   Result Value Ref Range    Extra Tube hold for add-on    Green Top (Gel)    Collection Time: 11/19/20 10:52  AM   Result Value Ref Range    Extra Tube Hold for add-ons.    Lavender Top    Collection Time: 11/19/20 10:52 AM   Result Value Ref Range    Extra Tube hold for add-on    Gold Top - SST    Collection Time: 11/19/20 10:52 AM   Result Value Ref Range    Extra Tube Hold for add-ons.    Comprehensive Metabolic Panel    Collection Time: 11/19/20 10:52 AM    Specimen: Blood   Result Value Ref Range    Glucose 38 (C) 65 - 99 mg/dL    BUN 34 (H) 6 - 20 mg/dL    Creatinine 4.20 (H) 0.57 - 1.00 mg/dL    Sodium 132 (L) 136 - 145 mmol/L    Potassium 3.4 (L) 3.5 - 5.2 mmol/L    Chloride 94 (L) 98 - 107 mmol/L    CO2 24.6 22.0 - 29.0 mmol/L    Calcium 9.6 8.6 - 10.5 mg/dL    Total Protein 8.1 6.0 - 8.5 g/dL    Albumin 3.50 3.50 - 5.20 g/dL    ALT (SGPT) 11 1 - 33 U/L    AST (SGOT) 28 1 - 32 U/L    Alkaline Phosphatase 112 39 - 117 U/L    Total Bilirubin 0.4 0.0 - 1.2 mg/dL    eGFR Non African Amer 11 (L) >60 mL/min/1.73    eGFR  African Amer      Globulin 4.6 gm/dL    A/G Ratio 0.8 g/dL    BUN/Creatinine Ratio 8.1 7.0 - 25.0    Anion Gap 13.4 5.0 - 15.0 mmol/L   Procalcitonin    Collection Time: 11/19/20 10:52 AM    Specimen: Blood   Result Value Ref Range    Procalcitonin 0.35 (H) 0.00 - 0.25 ng/mL   hCG, Serum, Qualitative    Collection Time: 11/19/20 10:52 AM    Specimen: Blood   Result Value Ref Range    HCG Qualitative Negative Negative   CBC Auto Differential    Collection Time: 11/19/20 10:52 AM    Specimen: Blood   Result Value Ref Range    WBC 7.36 3.40 - 10.80 10*3/mm3    RBC 3.38 (L) 3.77 - 5.28 10*6/mm3    Hemoglobin 8.9 (L) 12.0 - 15.9 g/dL    Hematocrit 27.4 (L) 34.0 - 46.6 %    MCV 81.1 79.0 - 97.0 fL    MCH 26.3 (L) 26.6 - 33.0 pg    MCHC 32.5 31.5 - 35.7 g/dL    RDW 15.6 (H) 12.3 - 15.4 %    RDW-SD 45.1 37.0 - 54.0 fl    MPV 8.8 6.0 - 12.0 fL    Platelets 330 140 - 450 10*3/mm3    Neutrophil % 67.8 42.7 - 76.0 %    Lymphocyte % 21.3 19.6 - 45.3 %    Monocyte % 8.7 5.0 - 12.0 %    Eosinophil % 0.8 0.3 - 6.2 %     Basophil % 0.3 0.0 - 1.5 %    Neutrophils, Absolute 4.99 1.70 - 7.00 10*3/mm3    Lymphocytes, Absolute 1.57 0.70 - 3.10 10*3/mm3    Monocytes, Absolute 0.64 0.10 - 0.90 10*3/mm3    Eosinophils, Absolute 0.06 0.00 - 0.40 10*3/mm3    Basophils, Absolute 0.02 0.00 - 0.20 10*3/mm3   Scan Slide    Collection Time: 11/19/20 10:52 AM    Specimen: Blood   Result Value Ref Range    Ovalocytes Slight/1+ None Seen    Spherocytes Slight/1+ None Seen    WBC Morphology Normal Normal    Platelet Morphology Normal Normal   Urinalysis With Microscopic If Indicated (No Culture) - Urine, Clean Catch    Collection Time: 11/19/20 12:00 PM    Specimen: Urine, Clean Catch   Result Value Ref Range    Color, UA Yellow Yellow, Straw    Appearance, UA Cloudy (A) Clear    pH, UA 7.5 5.0 - 8.0    Specific Gravity, UA 1.006 1.005 - 1.030    Glucose, UA Negative Negative    Ketones, UA Negative Negative    Bilirubin, UA Negative Negative    Blood, UA Small (1+) (A) Negative    Protein,  mg/dL (2+) (A) Negative    Leuk Esterase, UA Large (3+) (A) Negative    Nitrite, UA Negative Negative    Urobilinogen, UA 0.2 E.U./dL 0.2 - 1.0 E.U./dL   Urinalysis, Microscopic Only - Urine, Clean Catch    Collection Time: 11/19/20 12:00 PM    Specimen: Urine, Clean Catch   Result Value Ref Range    RBC, UA 0-2 None Seen, 0-2 /HPF    WBC, UA 31-50 (A) None Seen, 0-2 /HPF    Bacteria, UA None Seen None Seen /HPF    Squamous Epithelial Cells, UA 0-2 None Seen, 0-2 /HPF    Hyaline Casts, UA 3-6 None Seen /LPF    Methodology Automated Microscopy    Lactic Acid, Plasma    Collection Time: 11/19/20 12:03 PM    Specimen: Blood   Result Value Ref Range    Lactate 1.7 0.5 - 2.0 mmol/L   Respiratory Panel PCR w/COVID-19(SARS-CoV-2) WEN/KATINA/JORGE A/PAD/COR/MAD/ANGELICA In-House, NP Swab in UTM/VTM, 3-4 HR TAT - Swab, Nasopharynx    Collection Time: 11/19/20 12:06 PM    Specimen: Nasopharynx; Swab   Result Value Ref Range    ADENOVIRUS, PCR Not Detected Not Detected     Coronavirus 229E Not Detected Not Detected    Coronavirus HKU1 Not Detected Not Detected    Coronavirus NL63 Not Detected Not Detected    Coronavirus OC43 Not Detected Not Detected    COVID19 Not Detected Not Detected - Ref. Range    Human Metapneumovirus Not Detected Not Detected    Human Rhinovirus/Enterovirus Not Detected Not Detected    Influenza A PCR Not Detected Not Detected    Influenza B PCR Not Detected Not Detected    Parainfluenza Virus 1 Not Detected Not Detected    Parainfluenza Virus 2 Not Detected Not Detected    Parainfluenza Virus 3 Not Detected Not Detected    Parainfluenza Virus 4 Not Detected Not Detected    RSV, PCR Not Detected Not Detected    Bordetella pertussis pcr Not Detected Not Detected    Bordetella parapertussis PCR Not Detected Not Detected    Chlamydophila pneumoniae PCR Not Detected Not Detected    Mycoplasma pneumo by PCR Not Detected Not Detected   POC Glucose Once    Collection Time: 11/19/20 12:36 PM    Specimen: Blood   Result Value Ref Range    Glucose 105 70 - 130 mg/dL   POC Glucose Once    Collection Time: 11/19/20 12:39 PM    Specimen: Blood   Result Value Ref Range    Glucose 30 (C) 70 - 130 mg/dL   POC Glucose Once    Collection Time: 11/19/20  1:24 PM    Specimen: Blood   Result Value Ref Range    Glucose 166 (H) 70 - 130 mg/dL   POC Glucose Once    Collection Time: 11/19/20  2:38 PM    Specimen: Blood   Result Value Ref Range    Glucose 96 70 - 130 mg/dL       Ordered the above labs and independently reviewed the results.      RADIOLOGY  Xr Chest Ap    Result Date: 11/19/2020  XR CHEST AP-  INDICATIONS: Cough and fever, possible Covid 19  TECHNIQUE: Frontal view of the chest  COMPARISON: 05/27/2017  FINDINGS:  The heart size is borderline. Pulmonary vasculature is unremarkable. No focal pulmonary consolidation, pleural effusion, or pneumothorax. No acute osseous process.       No focal pulmonary consolidation. Borderline heart size. Follow-up as indications persist.     This report was finalized on 11/19/2020 12:49 PM by Dr. Carlos Sheffield M.D.        I ordered the above noted radiological studies and viewed the images on the PACS system.           MEDICAL RECORD REVIEW  Medical records reviewed in epic      PROCEDURES    Critical Care  Performed by: Rachelle Han APRN  Authorized by: Sara Renteria MD     Critical care provider statement:     Critical care time (minutes):  30    Critical care was necessary to treat or prevent imminent or life-threatening deterioration of the following conditions:  Sepsis and metabolic crisis    Critical care was time spent personally by me on the following activities:  Ordering and performing treatments and interventions, development of treatment plan with patient or surrogate, ordering and review of laboratory studies, discussions with consultants, ordering and review of radiographic studies, discussions with primary provider, evaluation of patient's response to treatment, re-evaluation of patient's condition, examination of patient, review of old charts and obtaining history from patient or surrogate            DIFFERENTIAL DIAGNOSIS  Differential diagnosis include but are not limited to the following: Dehydration, COVID-19, pneumonia, electrolyte abnormality, anemia, viral illness      PROGRESS, DATA ANALYSIS, CONSULTS, AND MEDICAL DECISION MAKING        ED Course as of Nov 19 1507   Thu Nov 19, 2020   1218 Discussed pertinent information from history and physical exam with patient.  Discussed differential diagnosis and plan for ED evaluation/work-up and treatment including labs, chest x-ray, IV fluids.  All questions answered.  Patient is agreeable with this plan.        [MS]   1236 Glucose(!!): 38 [MS]   1236 BUN(!): 34 [MS]   1236 Creatinine(!): 4.20 [MS]   1236 Patient is blood glucose is 38, will give an amp of D50 and patient is currently receiving IV fluids.  Patient has a elevated BUN and creatinine, the last parison that  I have was 3 years ago when they were essentially normal.   Potassium(!): 3.4 [MS]   1431 Discussed with patient's nephrology Dr. Plunkett regarding her current symptoms and lab values.  He is going to have one of the physicians that will be seen the patient in consult to call back.   Glucose(!): 166 [MS]   1441 Discussed with Dr Welsh regarding patient's relevant history, exam, workup and ED findings/concerns.  Dr Welsh agrees to see patient in consult.  He states he will see her while in the ER.         [MS]   1442 Glucose 96, patient is currenlty dry heaving and shaking, Zofran 4 mg IV ordered and NS at 125 an hour.   Glucose: 96 [MS]   1444 Temp is 100.3, Tylenol will be ordered.    [MS]   1500 Consult Note    Discussed care with Dr Ash  Reviewed patient's history, exam, results and need for admission secondary to Acute kidney injury, sepsis  Dr. Ash accepts the patient to be admitted to inpatient telemetry bed.        [MS]   1502 Dr Welsh has evaluated patient, would like for patient to have 1 dose of Vancomycin and 40 mg of IV Solumedrol.    [MS]      ED Course User Index  [MS] Rachelle Han, APRN     ADMISSION    Discussed treatment plan and reason for admission with pt/family and admitting physician.  Pt/family voiced understanding of the plan for admission for further testing/treatment as needed.      DIAGNOSIS  Final diagnoses:   Sepsis with acute renal failure without septic shock, due to unspecified organism, unspecified acute renal failure type (CMS/[facility])   Acute kidney injury (CMS/[facility])   Hypoglycemia           MEDICATIONS GIVEN IN ED    Medications   sodium chloride 0.9 % flush 10 mL (10 mL Intravenous Given 11/19/20 1443)   sodium chloride 0.9 % infusion (125 mL/hr Intravenous New Bag 11/19/20 1449)   acetaminophen (TYLENOL) tablet 1,000 mg (has no administration in time range)   vancomycin 1250 mg/250 mL 0.9% NS IVPB ([facility]) (has no administration in time range)      methylPREDNISolone sodium succinate (SOLU-Medrol) injection 40 mg (has no administration in time range)   sodium chloride 0.9 % bolus 1,000 mL (0 mL Intravenous Stopped 11/19/20 1443)   dextrose (D50W) 25 g/ 50mL Intravenous Solution 25 g (25 g Intravenous Given 11/19/20 1246)   cefepime (MAXIPIME) 2 g/100 mL 0.9% NS (mbp) (0 g Intravenous Stopped 11/19/20 1434)   ondansetron (ZOFRAN) injection 4 mg (4 mg Intravenous Given 11/19/20 1443)           COURSE & MEDICAL DECISION MAKING  Any/All labs and Any/All Imaging studies that were ordered were reviewed and are noted above.  Results were reviewed/discussed with the patient and they were also made aware of online assess.   Pt also made aware that some labs, such as cultures, will not be resulted during ER visit and follow up with PMD is necessary.        Rachelle Han, APRN  11/19/20 1508      Electronically signed by Sara Renteria MD at 11/19/20 2021                 Vital Signs (last day)     Date/Time   Temp   Temp src   Pulse   Resp   BP   Patient Position   SpO2    11/20/20 0852   97.5 (36.4)   Oral   68   16   126/82   Sitting   91    11/20/20 0300   --   --   80   16   107/62   Lying   100    11/19/20 2255   98.5 (36.9)   Oral   85   16   106/61   Lying   97    11/19/20 1933   --   --   --   --   101/58   Lying   --    11/19/20 1932   100.2 (37.9)   Oral   99   16   (!) 87/69   Lying   94    11/19/20 1603   100 (37.8)   Oral   98   16   134/80   Lying   100    11/19/20 14:52:35   100.3 (37.9)   Tympanic   91   18   121/58   --   95    11/19/20 13:48:29   98.7 (37.1)   Oral   82   18   107/51   Lying   100    11/19/20 12:46:22   --   --   82   18   142/76   Lying   97    11/19/20 12:04:02   --   --   82   18   129/80   Lying   98    11/19/20 1037   --   --   --   --   129/78   Lying   --    11/19/20 1017   99.5 (37.5)   Tympanic   89   20   --   --   96              Oxygen Therapy (last day)     Date/Time   SpO2   Device (Oxygen Therapy)   Flow  (L/min)   Oxygen Concentration (%)   ETCO2 (mmHg)    11/20/20 0852   91   room air   --   --   --    11/20/20 0750   --   room air   --   --   --    11/20/20 0300   100   room air   --   --   --    11/20/20 0259   --   room air   --   --   --    11/19/20 2255   97   room air   --   --   --    11/19/20 2042   --   room air   --   --   --    11/19/20 1932   94   room air   --   --   --    11/19/20 1603   100   room air   --   --   --    11/19/20 1600   --   room air   --   --   --    11/19/20 14:52:35   95   room air   --   --   --    11/19/20 13:48:29   100   room air   --   --   --    11/19/20 12:46:22   97   room air   --   --   --    11/19/20 12:04:02   98   room air   --   --   --    11/19/20 1017   96   room air   --   --   --                Facility-Administered Medications as of 11/20/2020   Medication Dose Route Frequency Provider Last Rate Last Admin   • acetaminophen (TYLENOL) tablet 650 mg  650 mg Oral Q4H PRN Lázaro Ash MD   650 mg at 11/19/20 2152   • allopurinol (ZYLOPRIM) tablet 100 mg  100 mg Oral Nightly Lázaro Ash MD   100 mg at 11/19/20 2154   • atenolol (TENORMIN) tablet 50 mg  50 mg Oral Q24H Lázaro Ash MD   Stopped at 11/20/20 0922   • atorvastatin (LIPITOR) tablet 10 mg  10 mg Oral Nightly Lázaro Ash MD   10 mg at 11/19/20 2153   • buPROPion XL (WELLBUTRIN XL) 24 hr tablet 300 mg  300 mg Oral Daily Lázaro Ash MD   300 mg at 11/20/20 0922   • [COMPLETED] cefepime (MAXIPIME) 2 g/100 mL 0.9% NS (mbp)  2 g Intravenous Once Rachelle Han APRN   Stopped at 11/19/20 1434   • cefepime (MAXIPIME) 2 g/100 mL 0.9% NS (mbp)  2 g Intravenous Q24H Lázaro Ash MD 25 mL/hr at 11/20/20 1204 2 g at 11/20/20 1204   • [COMPLETED] dextrose (D50W) 25 g/ 50mL Intravenous Solution 25 g  25 g Intravenous Once Rachelle Han APRN   25 g at 11/19/20 1246   • dextrose 5 % and sodium chloride 0.9 % infusion  75 mL/hr Intravenous Continuous Lázaro Ash MD 75 mL/hr at 11/20/20 0918 75  mL/hr at 11/20/20 0918   • famotidine (PEPCID) tablet 20 mg  20 mg Oral BID Lázaro Ash MD   20 mg at 11/20/20 0630   • insulin lispro (humaLOG) injection 0-7 Units  0-7 Units Subcutaneous TID AC Lázaro Ash MD   5 Units at 11/20/20 1203   • [COMPLETED] methylPREDNISolone sodium succinate (SOLU-Medrol) injection 40 mg  40 mg Intravenous Once Rachelle Han APRN   40 mg at 11/19/20 1517   • [COMPLETED] ondansetron (ZOFRAN) injection 4 mg  4 mg Intravenous Once Rachelle Han APRN   4 mg at 11/19/20 1443   • ondansetron (ZOFRAN) injection 4 mg  4 mg Intravenous Q6H PRN Lázaro Ash MD   4 mg at 11/19/20 1842   • ondansetron ODT (ZOFRAN-ODT) disintegrating tablet 4 mg  4 mg Oral Q6H PRN Lázaro Ash MD       • paricalcitol (ZEMPLAR) capsule 2 mcg  2 mcg Oral Nightly Lázaro Ash MD   2 mcg at 11/19/20 2156   • Pharmacy to dose vancomycin   Does not apply Continuous PRN Lázaro Ash MD       • [COMPLETED] potassium chloride (K-DUR,KLOR-CON) ER tablet 20 mEq  20 mEq Oral Once Lázaro Ash MD   20 mEq at 11/19/20 2152   • predniSONE (DELTASONE) tablet 5 mg  5 mg Oral Nightly Lázaro Ash MD   5 mg at 11/19/20 2154   • sirolimus (RAPAMUNE) tablet 2 mg  2 mg Oral Daily Lázaro Ash MD   2 mg at 11/19/20 2155   • sodium bicarbonate tablet 1,300 mg  1,300 mg Oral TID Lázaro Ash MD   1,300 mg at 11/20/20 0924   • [COMPLETED] sodium chloride 0.9 % bolus 1,000 mL  1,000 mL Intravenous Once Rachelle Han APRN   Stopped at 11/19/20 1443   • sodium chloride 0.9 % flush 10 mL  10 mL Intravenous PRN Rachelle Han APRN   10 mL at 11/19/20 2156   • [COMPLETED] vancomycin 1250 mg/250 mL 0.9% NS IVPB ([facility])  20 mg/kg Intravenous Once Rachelle Han APRN   1,250 mg at 11/19/20 1843   • vancomycin 500 mg/100 mL 0.9% NS IVPB (mbp)  500 mg Intravenous Once Lázaro Ash MD       • Vancomycin Pharmacy Intermittent Dosing   Does not apply Daily Lázaro Ash MD         Orders  (last 24 hrs)      Start     Ordered    11/20/20 1500  vancomycin 500 mg/100 mL 0.9% NS IVPB (mbp)  Once      11/20/20 0800    11/20/20 1300  cefepime (MAXIPIME) 2 g/100 mL 0.9% NS (mbp)  Every 24 Hours      11/19/20 1655    11/20/20 1138  POC Glucose Once  Once      11/20/20 1126    11/20/20 1123  Insert Indwelling Urinary Catheter  Once      11/20/20 1123    11/20/20 1123  Assess Need for Indwelling Urinary Catheter - Follow Removal Protocol  Continuous     Comments: Indwelling Urinary Catheter Removal Criteria  Discontinue Indwelling Urinary Catheter Unless One of the Following is Present:  Urinary Retention or Obstruction  Chronic Urinary Catheter Use  End of Life  Critical Illness with Strict I/O   Tract or Abdominal Surgery  Stage 3/4 Sacral / Perineal Wound  Required Activity Restriction: Trauma  Required Activity Restriction: Spine Surgery  If Patient is Being Followed by Urology Contact Them PRIOR to Removal  Do Not Remove Indwelling Urinary Catheter Order is Present with a CLINICAL REASON to Maintain the Catheter. Provider is Required to Include a Clinical Reason to Maintain a Urinary Catheter    Patient Admitted With Indwelling Urinary Catheter (Not Placed at [facility])  Assess for Continued Need & Document Medical Necessity  If Infection is Suspected, Contact the Provider        11/20/20 1123    11/20/20 1123  Urinary Catheter Care  Every Shift      11/20/20 1123    11/20/20 0900  methylPREDNISolone sodium succinate (SOLU-Medrol) injection 40 mg  Daily,   Status:  Discontinued      11/19/20 1519    11/20/20 0715  Hemoglobin A1c  Morning Draw      11/19/20 1702    11/20/20 0714  CBC Auto Differential  PROCEDURE ONCE      11/20/20 0002    11/20/20 0712  CT Abdomen Pelvis Without Contrast  1 Time Imaging      11/20/20 0711    11/20/20 0708  Manual Differential  Once,   Status:  Canceled      11/20/20 0707    11/20/20 0630  POC Glucose Once  Once      11/20/20 0621    11/20/20 0629  POC Glucose Once   Once      11/20/20 0620    11/20/20 0600  CBC & Differential  Morning Draw      11/19/20 1702    11/20/20 0600  Comprehensive Metabolic Panel  Morning Draw      11/19/20 1702    11/20/20 0600  BNP  Morning Draw      11/19/20 1702    11/20/20 0600  TSH  Morning Draw      11/19/20 1702    11/20/20 0600  Lipid Panel  Morning Draw      11/19/20 1702    11/20/20 0600  Vancomycin, Random  Morning Draw      11/19/20 1729    11/20/20 0600  famotidine (PEPCID) tablet 20 mg  2 times daily      11/19/20 2258    11/19/20 2200  POC Glucose 4x Daily AC & at Bedtime  4 Times Daily Before Meals & at Bedtime     Comments: If bedtime blood glucose is greater than 350 mg/dl, call MD.      11/19/20 1701    11/19/20 2118  POC Glucose Once  Once      11/19/20 2115 11/19/20 2100  allopurinol (ZYLOPRIM) tablet 300 mg  Nightly,   Status:  Discontinued      11/19/20 1650 11/19/20 2100  atorvastatin (LIPITOR) tablet 40 mg  Nightly,   Status:  Discontinued      11/19/20 1650 11/19/20 2100  paricalcitol (ZEMPLAR) capsule 2 mcg  Nightly      11/19/20 1650 11/19/20 2100  predniSONE (DELTASONE) tablet 5 mg  Nightly      11/19/20 1650    11/19/20 2100  sodium bicarbonate tablet 1,300 mg  3 Times Daily      11/19/20 1650    11/19/20 2100  allopurinol (ZYLOPRIM) tablet 100 mg  Nightly      11/19/20 1651 11/19/20 2100  atorvastatin (LIPITOR) tablet 10 mg  Nightly      11/19/20 1652 11/19/20 2033  acetaminophen (TYLENOL) tablet 650 mg  Every 4 Hours PRN      11/19/20 2033 11/19/20 2000  potassium chloride (K-DUR,KLOR-CON) ER tablet 20 mEq  Once      11/19/20 1901 11/19/20 1830  sodium chloride 0.9 % with KCl 20 mEq/L infusion  Continuous,   Status:  Discontinued      11/19/20 1649 11/19/20 1830  atenolol (TENORMIN) tablet 25 mg  Every 24 Hours Scheduled,   Status:  Discontinued      11/19/20 1650 11/19/20 1830  buPROPion XL (WELLBUTRIN XL) 24 hr tablet 300 mg  Daily      11/19/20 1650 11/19/20 1830  sirolimus  (RAPAMUNE) tablet 2 mg  Daily      11/19/20 1650    11/19/20 1830  atenolol (TENORMIN) tablet 50 mg  Every 24 Hours Scheduled      11/19/20 1652    11/19/20 1815  Vancomycin Pharmacy Intermittent Dosing  Daily      11/19/20 1729    11/19/20 1800  insulin lispro (humaLOG) injection 0-7 Units  3 Times Daily Before Meals      11/19/20 1701    11/19/20 1756  POC Glucose Once  Once      11/19/20 1753    11/19/20 1745  cefepime (MAXIPIME) 2 g/100 mL 0.9% NS (mbp)  Once,   Status:  Discontinued      11/19/20 1647    11/19/20 1745  dextrose (D50W) 25 g/ 50mL Intravenous Solution 25 g  Once,   Status:  Discontinued      11/19/20 1647    11/19/20 1745  methylPREDNISolone sodium succinate (SOLU-Medrol) injection 40 mg  Once,   Status:  Discontinued      11/19/20 1647    11/19/20 1745  cefepime (MAXIPIME) 1 g/100 mL 0.9% NS IVPB (mbp)  Every 12 Hours,   Status:  Discontinued      11/19/20 1648    11/19/20 1745  sodium chloride 0.9 % bolus 1,000 mL  Once,   Status:  Discontinued      11/19/20 1648    11/19/20 1745  dextrose 5 % and sodium chloride 0.9 % infusion  Continuous      11/19/20 1652    11/19/20 1718  ondansetron ODT (ZOFRAN-ODT) disintegrating tablet 4 mg  Every 6 Hours PRN      11/19/20 1718    11/19/20 1715  famotidine (PEPCID) tablet 20 mg  Daily,   Status:  Discontinued      11/19/20 1701    11/19/20 1703  Place Sequential Compression Device  Once      11/19/20 1702    11/19/20 1703  Maintain Sequential Compression Device  Continuous      11/19/20 1702    11/19/20 1703  Code Status and Medical Interventions:  Continuous      11/19/20 1702    11/19/20 1703  Activity As Tolerated  Until Discontinued      11/19/20 1702    11/19/20 1703  Inpatient Infectious Diseases Consult  Once     Specialty:  Infectious Diseases  Provider:  Cintia Damon MD    11/19/20 1702    11/19/20 1653  Diet Clear Liquid  Diet Effective Now      11/19/20 1652 11/19/20 1651  Pharmacy to dose vancomycin  Continuous PRN      11/19/20 1652     11/19/20 1648  ondansetron (ZOFRAN) injection 4 mg  Every 6 Hours PRN      11/19/20 1648    11/19/20 1625  POC Glucose Once  Once      11/19/20 1623    11/19/20 1611  Inpatient Spiritual Care Consult  Once     Provider:  (Not yet assigned)    11/19/20 1611    11/19/20 1611  Inpatient Nutrition Consult  Once     Provider:  (Not yet assigned)    11/19/20 1611    11/19/20 1527  Urine Culture - Urine, Urine, Clean Catch  Once      11/19/20 1526    11/19/20 1521  potassium chloride (K-DUR,KLOR-CON) ER tablet 20 mEq  Once,   Status:  Discontinued      11/19/20 1519    11/19/20 1521  dextrose 5 % and sodium chloride 0.9 % infusion  Continuous,   Status:  Discontinued      11/19/20 1519    11/19/20 1519  Blood Culture - Blood, Arm, Right  Once      11/19/20 1519    11/19/20 1519  Urinalysis With Culture If Indicated - Urine, Clean Catch  Once,   Status:  Canceled      11/19/20 1519    11/19/20 1508  Critical Care  Once     Comments: This order was created via procedure documentation    11/19/20 1507    11/19/20 1507  vancomycin 1250 mg/250 mL 0.9% NS IVPB ([facility])  Once      11/19/20 1505    11/19/20 1507  methylPREDNISolone sodium succinate (SOLU-Medrol) injection 40 mg  Once      11/19/20 1505    11/19/20 1507  Inpatient Admission  Once      11/19/20 1506    11/19/20 1447  acetaminophen (TYLENOL) tablet 1,000 mg  Once,   Status:  Discontinued      11/19/20 1445    11/19/20 1442  sodium chloride 0.9 % infusion  Continuous,   Status:  Discontinued      11/19/20 1440    11/19/20 1442  ondansetron (ZOFRAN) injection 4 mg  Once      11/19/20 1440    11/19/20 1442  LIPPS (on-call MD unless specified)  Once     Specialty:  Internal Medicine  Provider:  (Not yet assigned)    11/19/20 1441    11/19/20 1441  Discontinue Patient Isolation  Once      11/19/20 1440    11/19/20 1439  POC Glucose Once  Once      11/19/20 1438    11/19/20 1356  Scan Slide  Once      11/19/20 1355    11/19/20 1330  cefepime (MAXIPIME) 2 g/100 mL 0.9% NS  (mbp)  Once      20 1328    20 1329  Nephrology (on -call MD unless specified)  Once     Specialty:  Nephrology  Provider:  Minh Plunkett MD    20 1328    20 1328  Blood Culture - Blood, Arm, Right  Once      20 1328    20 1328  Blood Culture - Blood, Hand, Right  Once      20 1328    20 1327  POC Glucose Once  Once      20 1324    20 1322  Manual Differential  Once,   Status:  Canceled      20 1321    20 1246  POC Glucose Once  Once      20 1239    20 1239  POC Glucose Once  Once      20 1236    20 1238  dextrose (D50W) 25 g/ 50mL Intravenous Solution 25 g  Once      20 1236    20 1226  Urinalysis, Microscopic Only - Urine, Clean Catch  Once      20 1225    20 1213  CBC Auto Differential  Once      20 1212    20 1155  sodium chloride 0.9 % bolus 1,000 mL  Once      20 1153    20 1153  sodium chloride 0.9 % flush 10 mL  As Needed      20 1153    --  buPROPion XL (WELLBUTRIN XL) 300 MG 24 hr tablet  Daily      20 1534    --  hydrOXYzine (ATARAX) 25 MG tablet  Nightly      20 1534    --  alendronate (FOSAMAX) 70 MG tablet  Every 7 Days      20 1534                          Consult Notes      Lindy Chapman at 20 1034        Patient mentioned that she takes care of others instead of herself. We were having a conversation but she received a phone call from her granddaughter.    Electronically signed by Lindy Chapman at 20 1036     Cintia Damon MD at 20      Consult Orders    1. Inpatient Infectious Diseases Consult [[id number]] ordered by Lázaro Ash MD at 20 1702               CONSULT NOTE    Infectious Diseases - Cintia Day MD  [facility]       Patient Identification:  Name: Kinga Taylor  Age: 50 y.o.  Sex: female  :  1970  MRN: [id number]             Date of  Consultation: 11/19/2020      Primary Care Physician: Alfredo Martinez MD                               Requesting Physician: Dr. Ash  Reason for Consultation: Sepsis    Impression: Patient is a 50-year-old female with past medical history remarkable for history of kidney transplant in 1995, on immunosuppressive therapy which was adjusted to 2- 3 months ago in which her Prograf was discontinued and Rapamune was started, has chronic kidney disease with baseline creatinine of 2.4 was in her usual state of her health until 3 - 4 days ago when she developed nausea vomiting diarrhea with mid abdominal pain and discomfort in the right lower back and pain.  This is associated with decreased appetite and has lost weight and complaining of being fatigued and weak over the past 2 months.  Work-up in the emergency room revealed abnormal urinalysis consistent with urinary tract infection along with hypoglycemia and lactate of 1.7.  Patient was noted to have low-grade temperature of 99.5 upon arrival.  She was noted to be profoundly hypoglycemic with worsening kidney function and was noted to be severely anemic.  Patient has been given IV fluids and was empirically started on IV vancomycin and cefepime and was provided with prednisone and Solu-Medrol for possible steroid induced adrenal insufficiency.  Patient is currently feeling better.  This presentation and above context is consistent with:  1-evolving sepsis secondary to  2-urinary tract infection with possible intra-abdominal process such as intra-abdominal abscess which could range from hepatic abscess versus diverticular abscess abutting urinary bladder causing sympathetic pyuria  3-severe hyperglycemia  4-immunocompromised host  5-history of kidney transplant  6-acute on chronic renal failure  7-severe anemia      Recommendations/Discussions:  At this juncture I agree with your care plan consisting of empiric broad-spectrum antibiotics after blood cultures and urine  cultures are taken.  Will de-escalate antibiotics based on the culture data response to treatment and any adverse effect that could occur from this combination to her organ system.  Given her chronicity of progressive weight loss and not feeling very well in the context of underlying ongoing use of immunosuppressive therapy I would recommend CT scan of the abdomen and pelvis without contrast to further evaluate intra-abdominal pathology that may require urgent intervention if found despite antibiotic therapy.  Supportive care for her other medical issues per primary team.  Thank you Dr. Henley for letting me be the part of your patient.  Please see above impression and recommendations      History of Present Illness:    Patient is a 50-year-old female with past medical history remarkable for history of kidney transplant in 1995, on immunosuppressive therapy which was adjusted to 2- 3 months ago in which her Prograf was discontinued and Rapamune was started, has chronic kidney disease with baseline creatinine of 2.4 was in her usual state of her health until 3 - 4 days ago when she developed nausea vomiting diarrhea with mid abdominal pain and discomfort in the right lower back and pain.  This is associated with decreased appetite and has lost weight and complaining of being fatigued and weak over the past 2 months.  Work-up in the emergency room revealed abnormal urinalysis consistent with urinary tract infection along with hypoglycemia and lactate of 1.7.  Patient was noted to have low-grade temperature of 99.5 upon arrival.  She was noted to be profoundly hypoglycemic with worsening kidney function and was noted to be severely anemic.  Patient has been given IV fluids and was empirically started on IV vancomycin and cefepime and was provided with prednisone and Solu-Medrol for possible steroid induced adrenal insufficiency.  Patient is currently feeling better.       Past Medical History:  Past Medical History:    Diagnosis Date   • Acid reflux disease    • Bruises easily    • Depression    • Diabetes mellitus (CMS/HCC)    • Disease of thyroid gland     PARATHYROID   • End stage renal disease (CMS/HCC)    • Fistula, arteriovenous, acquired (CMS/HCC)     left arm   • Hiatal hernia    • Hip pain, left    • History of anesthesia complications     nightmares and has trouble waking up   • History of transfusion     UNSURE OF LAST.  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   • HIATAL HERNIA REPAIR      age 15   • HIP SURGERY      replacements bilt with core depressions    • JOINT REPLACEMENT Bilateral     HIP   • NEPHRECTOMY Right 09/1994   • NEPHROLITHOTOMY Right 01/05/1995    TRANSPLANT   • REIMPLANT URETER IN BLADDER     • STOMACH SURGERY      area near transplant due to necrosis area removed   • TONSILLECTOMY     • TOTAL HIP ARTHROPLASTY REVISION Right    • TOTAL HIP ARTHROPLASTY REVISION Left 6/28/2017    Procedure: LT TOTAL HIP ARTHROPLASTY REVISION;  Surgeon: Wil Curtis MD;  Location: [facility];  Service:       Home Meds:  Medications Prior to Admission   Medication Sig Dispense Refill Last Dose   • alendronate (FOSAMAX) 70 MG tablet Take 70 mg by mouth Every 7 (Seven) Days. Patient takes on Wednesdays 11/18/2020 at Unknown time   • allopurinol (ZYLOPRIM) 300 MG tablet Take 300 mg by mouth Every Night.   11/18/2020 at Unknown time   • atenolol (TENORMIN) 100 MG tablet Take 100 mg by mouth Every Night.   11/18/2020 at Unknown time   •  atorvastatin (LIPITOR) 40 MG tablet Take 40 mg by mouth Every Night.   11/18/2020 at Unknown time   • buPROPion XL (WELLBUTRIN XL) 300 MG 24 hr tablet Take 300 mg by mouth Daily.   11/18/2020 at Unknown time   • glyburide (DIAbeta) 2.5 MG tablet Take 2.5 mg by mouth Every Morning.   11/18/2020 at Unknown time   • hydrOXYzine (ATARAX) 25 MG tablet Take 25 mg by mouth Every Night.   11/18/2020 at Unknown time   • omeprazole (priLOSEC) 40 MG capsule Take 40 mg by mouth Every Morning.   11/18/2020 at Unknown time   • paricalcitol (ZEMPLAR) 2 MCG capsule Take 2 mg by mouth Every Night.   11/18/2020 at Unknown time   • predniSONE (DELTASONE) 5 MG tablet Take 5 mg by mouth Every Night.   11/18/2020 at Unknown time   • sirolimus (Rapamune) 2 MG tablet Take 2 mg by mouth Daily.   11/18/2020 at Unknown time   • sodium bicarbonate 650 MG tablet Take 1,300 mg by mouth 2 (Two) Times a Day.   11/18/2020 at Unknown time     Current Meds:     Current Facility-Administered Medications:   •  acetaminophen (TYLENOL) tablet 650 mg, 650 mg, Oral, Q4H PRN, Lázaro Ash MD  •  allopurinol (ZYLOPRIM) tablet 100 mg, 100 mg, Oral, Nightly, Lázaro Ash MD  •  atenolol (TENORMIN) tablet 50 mg, 50 mg, Oral, Q24H, Lázaro Ash MD  •  atorvastatin (LIPITOR) tablet 10 mg, 10 mg, Oral, Nightly, Lázaro Ash MD  •  buPROPion XL (WELLBUTRIN XL) 24 hr tablet 300 mg, 300 mg, Oral, Daily, Lázaro Ash MD  •  [START ON 11/20/2020] cefepime (MAXIPIME) 2 g/100 mL 0.9% NS (mbp), 2 g, Intravenous, Q24H, Lázaro Ash MD  •  dextrose 5 % and sodium chloride 0.9 % infusion, 75 mL/hr, Intravenous, Continuous, Lázaro Ash MD, Last Rate: 75 mL/hr at 11/19/20 1842, 75 mL/hr at 11/19/20 1842  •  famotidine (PEPCID) tablet 20 mg, 20 mg, Oral, Daily, Lázaro Ash MD, 20 mg at 11/19/20 1842  •  insulin lispro (humaLOG) injection 0-7 Units, 0-7 Units, Subcutaneous, TID AC, Lázaro Ash MD  •  ondansetron (ZOFRAN) injection 4 mg, 4 mg, Intravenous, Q6H PRN,  Lázaro Ash MD, 4 mg at 11/19/20 1842  •  ondansetron ODT (ZOFRAN-ODT) disintegrating tablet 4 mg, 4 mg, Oral, Q6H PRN, Lázaro Ash MD  •  paricalcitol (ZEMPLAR) capsule 2 mcg, 2 mcg, Oral, Nightly, Lázaro Ash MD  •  Pharmacy to dose vancomycin, , Does not apply, Continuous PRN, Lázaro Ash MD  •  potassium chloride (K-DUR,KLOR-CON) ER tablet 20 mEq, 20 mEq, Oral, Once, Lázaro Ash MD  •  predniSONE (DELTASONE) tablet 5 mg, 5 mg, Oral, Nightly, Lázaro Ash MD  •  sirolimus (RAPAMUNE) tablet 2 mg, 2 mg, Oral, Daily, Lázaro Ash MD  •  sodium bicarbonate tablet 1,300 mg, 1,300 mg, Oral, TID, Lázaro Ash MD  •  [COMPLETED] Insert peripheral IV, , , Once **AND** sodium chloride 0.9 % flush 10 mL, 10 mL, Intravenous, PRN, Rachelle Han, APRN, 10 mL at 11/19/20 1443  •  Vancomycin Pharmacy Intermittent Dosing, , Does not apply, Daily, Lázaro Ash MD  Allergies:  Allergies   Allergen Reactions   • Aldomet [Methyldopa] Swelling     FACIAL SWELLING AND HIVES   • Dilaudid [Hydromorphone Hcl] Itching   • Morphine And Related Itching     Social History:   Social History     Tobacco Use   • Smoking status: Never Smoker   • Smokeless tobacco: Never Used   Substance Use Topics   • Alcohol use: No      Family History:  Family History   Problem Relation Age of Onset   • Cancer Mother    • Stroke Maternal Grandmother    • Malig Hyperthermia Neg Hx           Review of Systems  See history of present illness and past medical history.    Constitutional: Remarkable no fever chills feeling poorly progressive fatigue worse in the last few days but has been building up over the course of few months.  Cardiovascular: Remarkable for no cough congestion or shortness of breath  GI: Remarkable for abdominal discomfort decreased appetite and weight loss.  : Remarkable for no burning urination does have frequency and urgency and abdominal discomfort and flank pain.  Musculoskeletal: Remarkable for no specific joint  "aches and pain with generalized discomfort  Neurological: Remarkable for no loss of consciousness or continence but significantly weak.    Remainder of ROS is negative.      Vitals:   /58 (BP Location: Right arm, Patient Position: Lying)   Pulse 99   Temp 100.2 °F (37.9 °C) (Oral)   Resp 16   Ht 160 cm (63\")   Wt 60.3 kg (133 lb)   LMP 11/19/2020   SpO2 94%   BMI 23.56 kg/m²   I/O:     Intake/Output Summary (Last 24 hours) at 11/19/2020 2043  Last data filed at 11/19/2020 1706  Gross per 24 hour   Intake 0 ml   Output --   Net 0 ml     Exam:  Patient is examined using the personal protective equipment as per guidelines from infection control for this particular patient as enacted.  Hand washing was performed before and after patient interaction.  General Appearance:    Alert, cooperative, no distress, appears stated age, does not appear to be toxic but appears ill.   Head:    Normocephalic, without obvious abnormality, atraumatic   Eyes:    PERRL, conjunctivae/corneas clear, EOM's intact, both eyes   Ears:    Normal external ear canals, both ears   Nose:   Nares normal, septum midline, mucosa normal, no drainage    or sinus tenderness   Throat:   Lips, tongue, gums normal; oral mucosa pink and moist   Neck:   Supple, symmetrical, trachea midline, no adenopathy;     thyroid:  no enlargement/tenderness/nodules; no carotid    bruit or JVD   Back:     Symmetric, no curvature, ROM normal, no CVA tenderness   Lungs:     Clear to auscultation bilaterally, respirations unlabored   Chest Wall:    No tenderness or deformity    Heart:    Regular rate and rhythm, S1 and S2 normal, no murmur, rub   or gallop   Abdomen:    Soft no guarding or rigidity noted mild generalized tenderness noted   Extremities:   Extremities normal, atraumatic, no cyanosis or edema   Pulses:   Pulses palpable in all extremities; symmetric all extremities   Skin:   Skin color normal, Skin is warm and dry,  no rashes or palpable lesions "   Neurologic:  Grossly nonfocal       Data Review:    I reviewed the patient's new clinical results.  Results from last 7 days   Lab Units 11/19/20  1052   WBC 10*3/mm3 7.36   HEMOGLOBIN g/dL 8.9*   PLATELETS 10*3/mm3 330     Results from last 7 days   Lab Units 11/19/20  1052   SODIUM mmol/L 132*   POTASSIUM mmol/L 3.4*   CHLORIDE mmol/L 94*   CO2 mmol/L 24.6   BUN mg/dL 34*   CREATININE mg/dL 4.20*   CALCIUM mg/dL 9.6   GLUCOSE mg/dL 38*     Microbiology Results (last 10 days)     Procedure Component Value - Date/Time    Respiratory Panel PCR w/COVID-19(SARS-CoV-2) WEN/KATINA/JORGE A/PAD/COR/MAD/ANGELICA In-House, NP Swab in UTM/VTM, 3-4 HR TAT - Swab, Nasopharynx [[id number]]  (Normal) Collected: 11/19/20 1206    Lab Status: Final result Specimen: Swab from Nasopharynx Updated: 11/19/20 1418     ADENOVIRUS, PCR Not Detected     Coronavirus 229E Not Detected     Coronavirus HKU1 Not Detected     Coronavirus NL63 Not Detected     Coronavirus OC43 Not Detected     COVID19 Not Detected     Human Metapneumovirus Not Detected     Human Rhinovirus/Enterovirus Not Detected     Influenza A PCR Not Detected     Influenza B PCR Not Detected     Parainfluenza Virus 1 Not Detected     Parainfluenza Virus 2 Not Detected     Parainfluenza Virus 3 Not Detected     Parainfluenza Virus 4 Not Detected     RSV, PCR Not Detected     Bordetella pertussis pcr Not Detected     Bordetella parapertussis PCR Not Detected     Chlamydophila pneumoniae PCR Not Detected     Mycoplasma pneumo by PCR Not Detected    Narrative:      Fact sheet for providers: https://docs.[employer]/wp-content/uploads/VDZ1110-9157-VM4.1-EUA-Provider-Fact-Sheet-3.pdf    Fact sheet for patients: https://docs.[employer]/wp-content/uploads/ABH9748-9612-QT5.1-EUA-Patient-Fact-Sheet-1.pdf            Assessment:  Active Hospital Problems    Diagnosis  POA   • Sepsis with acute renal failure without septic shock (CMS/HCC) [A41.9, R65.20, N17.9]  Yes      Resolved Hospital  Problems   No resolved problems to display.         Plan:  See above  Cintia Damon MD   11/19/2020  20:43 EST    Much of this encounter note is an electronic transcription/translation of spoken language to printed text. The electronic translation of spoken language may permit erroneous, or at times, nonsensical words or phrases to be inadvertently transcribed; Although I have reviewed the note for such errors, some may still exist      Electronically signed by Cintia Damon MD at 11/20/20 0710     Lee Welsh MD at 11/19/20 1154      Consult Orders    1. Nephrology (on -call MD unless specified) [[id number]] ordered by Rachelle Han APRN at 11/19/20 1328                 Referring Provider: Rachelle Han APRN   Reason for Consultation:YOEL    Subjective     Chief complaint   Chief Complaint   Patient presents with   • Nausea   • Fever       History of present illness:      50 years old white female with a past medical history of kidney transplantation in January 1985.  Patient is currently being maintained on Rapamune and prednisone and previously on Prograf and prednisone.  Her baseline creatinine appears to be around 2.6 mg/dL as of September 2020.  Patient came to the hospital because of fever chills.  Her urinalysis showed white blood cells less than 2050.  She does have history of recurrent UTI.  Patient was started on vancomycin and cefepime.  We were consulted to help with management of her acute kidney injury.          Past Medical History:   Diagnosis Date   • Acid reflux disease    • Bruises easily    • Depression    • Diabetes mellitus (CMS/HCC)    • Disease of thyroid gland     PARATHYROID   • End stage renal disease (CMS/HCC)    • Fistula, arteriovenous, acquired (CMS/HCC)     left arm   • Hiatal hernia    • Hip pain, left    • History of anesthesia complications     nightmares and has trouble waking up   • History of transfusion     UNSURE OF LAST.  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   • HIATAL HERNIA REPAIR      age 15   • HIP SURGERY      replacements bilt with core depressions    • JOINT REPLACEMENT Bilateral     HIP   • NEPHRECTOMY Right 09/1994   • NEPHROLITHOTOMY Right 01/05/1995    TRANSPLANT   • REIMPLANT URETER IN BLADDER     • STOMACH SURGERY      area near transplant due to necrosis area removed   • TONSILLECTOMY     • TOTAL HIP ARTHROPLASTY REVISION Right    • TOTAL HIP ARTHROPLASTY REVISION Left 6/28/2017    Procedure: LT TOTAL HIP ARTHROPLASTY REVISION;  Surgeon: Wil Curtis MD;  Location: [facility];  Service:      Family History   Problem Relation Age of Onset   • Cancer Mother    • Stroke Maternal Grandmother    • Malig Hyperthermia Neg Hx      Social History     Tobacco Use   • Smoking status: Never Smoker   • Smokeless tobacco: Never Used   Substance Use Topics   • Alcohol use: No   • Drug use: No     (Not in a hospital admission)    Allergies:  Aldomet [methyldopa], Dilaudid [hydromorphone hcl], and Morphine and related    Review of Systems  Pertinent items are noted in HPI.    Objective     Vital Signs  Temp:  [98.7 °F (37.1 °C)-100.3 °F (37.9 °C)] 100.3 °F (37.9 °C)  Heart Rate:  [82-91] 91  Resp:  [18-20] 18  BP: (107-142)/(51-80) 121/58    Flowsheet Rows      First Filed Value   Admission Height  160 cm (63\") Documented at 11/19/2020 1017   Admission Weight  60.3 kg (133 lb) Documented at 11/19/2020 1017           No intake/output data recorded.  No intake/output data recorded.  No intake " or output data in the 24 hours ending 11/19/20 1513    Physical Exam:     General Appearance:    Alert, cooperative, in no acute distress   Head:    Normocephalic, without obvious abnormality, atraumatic   Eyes:            Lids and lashes normal, conjunctivae and sclerae normal, no   icterus, no pallor, corneas clear, PERRLA   Ears:    Ears appear intact with no abnormalities noted   Throat:   No oral lesions, no thrush, oral mucosa moist   Neck:   No adenopathy, supple, trachea midline, no thyromegaly, no     carotid bruit, no JVD   Back:     No kyphosis present, no scoliosis present, no skin lesions,       erythema or scars, no tenderness to percussion or                   palpation,   range of motion normal   Lungs:     Clear to auscultation,respirations regular, even and                   unlabored    Heart:    Regular rhythm and normal rate, normal S1 and S2, no            murmur, no gallop, no rub, no click   Breast Exam:    Deferred   Abdomen:     Normal bowel sounds, no masses, no organomegaly, soft        + tender, non-distended, no guarding, no rebound                 tenderness   Genitalia:    Deferred   Extremities:   Moves all extremities well, no edema, no cyanosis, no              redness   Pulses:   Pulses palpable and equal bilaterally   Skin:   No bleeding, bruising or rash   Lymph nodes:   No palpable adenopathy   Neurologic:   Cranial nerves 2 - 12 grossly intact, sensation intact, DTR        present and equal bilaterally       Results Review:  Results from last 7 days   Lab Units 11/19/20  1052   SODIUM mmol/L 132*   POTASSIUM mmol/L 3.4*   CHLORIDE mmol/L 94*   CO2 mmol/L 24.6   BUN mg/dL 34*   CREATININE mg/dL 4.20*   CALCIUM mg/dL 9.6   BILIRUBIN mg/dL 0.4   ALK PHOS U/L 112   ALT (SGPT) U/L 11   AST (SGOT) U/L 28   GLUCOSE mg/dL 38*       Estimated Creatinine Clearance: 15.1 mL/min (A) (by C-G formula based on SCr of 4.2 mg/dL (H)).          Results from last 7 days   Lab Units  11/19/20  1052   WBC 10*3/mm3 7.36   HEMOGLOBIN g/dL 8.9*   PLATELETS 10*3/mm3 330             Active Medications  acetaminophen, 1,000 mg, Oral, Once  methylPREDNISolone sodium succinate, 40 mg, Intravenous, Once  vancomycin, 20 mg/kg, Intravenous, Once      sodium chloride, 125 mL/hr, Last Rate: 125 mL/hr (11/19/20 1449)        Assessment/Plan       -Acute kidney injury on chronic kidney disease stage IV: Likely due to sepsis and UTI.  Her allograft is very tender.  She does have history of recurrent UTI.  We will get urine culture and blood culture.  Will start patient on broad-spectrum antibiotic.  Will continue Rapamune and start her on Solu-Medrol 40 mg IV daily.    -History of kidney transplantation in January 1995 with baseline creatinine 2.4 mg/dL  -Urinary tract infection: Patient was started on cefepime and vancomycin.  Awaiting for urine culture and blood culture  -Immunosuppressive medication  -Hypokalemia  -Hyponatremia        Lee Welsh MD  11/19/20  15:13 EST                Electronically signed by Lee Welsh MD at 11/19/20 6998

## 2020-11-20 NOTE — PROGRESS NOTES
Discharge Planning Assessment  [facility]     Patient Name: Kinga Taylor  MRN: [id number]  Today's Date: 11/20/2020    Admit Date: 11/19/2020    Discharge Needs Assessment     Row Name 11/20/20 1423       Living Environment    Lives With  spouse    Name(s) of Who Lives With Patient  Michel    Current Living Arrangements  home/apartment/condo    Primary Care Provided by  self    Provides Primary Care For  no one    Family Caregiver if Needed  spouse    Family Caregiver Names  Michel Taylor    Quality of Family Relationships  helpful;involved;supportive    Able to Return to Prior Arrangements  yes       Resource/Environmental Concerns    Resource/Environmental Concerns  none    Transportation Concerns  car, none       Transition Planning    Patient/Family Anticipates Transition to  home with family;home with help/services    Patient/Family Anticipated Services at Transition  home health care    Transportation Anticipated  family or friend will provide       Discharge Needs Assessment    Readmission Within the Last 30 Days  no previous admission in last 30 days    Current Outpatient/Agency/Support Group  homecare agency    Equipment Currently Used at Home  walker, standard    Concerns to be Addressed  discharge planning    Anticipated Changes Related to Illness  none    Equipment Needed After Discharge  none    Outpatient/Agency/Support Group Needs  homecare agency    Provided Post Acute Provider List?  N/A    Provided Post Acute Provider Quality & Resource List?  N/A        Discharge Plan     Row Name 11/20/20 1423       Plan    Plan  Home with family    Patient/Family in Agreement with Plan  yes    Plan Comments  Facesheet verified.  Patient lives with her , Michel.  She is usually independent with ADLs and has a walker she uses at home.  She has used [facility] in the past and if needed would like to  use them again.  [facility] will continue to follow for any discharge needs.        Continued Care  and Services - Admitted Since 11/19/2020    Coordination has not been started for this encounter.         Demographic Summary     Row Name 11/20/20 1415       General Information    Admission Type  inpatient    Arrived From  home    Required Notices Provided  Important Message from Medicare    Reason for Consult  discharge planning    Preferred Language  English       Contact Information    Permission Granted to Share Info With  family/designee    Contact Information Comments  spouse Michel lam        Functional Status     Row Name 11/20/20 1422       Functional Status    Usual Activity Tolerance  moderate    Current Activity Tolerance  moderate       Functional Status, IADL    Medications  assistive equipment    Meal Preparation  assistive equipment    Housekeeping  assistive equipment    Laundry  assistive equipment    Shopping  assistive equipment    IADL Comments  uses a walker at home       Mental Status    General Appearance WDL  WDL       Mental Status Summary    Recent Changes in Mental Status/Cognitive Functioning  no changes        Psychosocial     Row Name 11/20/20 1422       Values/Beliefs    Spiritual, Cultural Beliefs, Mandaen Practices, Values that Affect Care  no       Behavior WDL    Behavior WDL  WDL       Emotion Mood WDL    Emotion/Mood/Affect WDL  WDL       Speech WDL    Speech WDL  WDL       Perceptual State WDL    Perceptual State WDL  WDL       Thought Process WDL    Thought Process WDL  WDL       Intellectual Performance WDL    Intellectual Performance WDL  WDL       Coping/Stress    Major Change/Loss/Stressor  none    Patient Personal Strengths  able to adapt    Sources of Support  adult child(radha);spouse    Reaction to Health Status  accepting;adjusting    Understanding of Condition and Treatment  adequate understanding of medical condition;adequate understanding of treatment       Developmental Stage (Eriksson's)    Developmental Stage  Stage 7 (35-65 years/Middle Adulthood)  Generativity vs. Stagnation        Abuse/Neglect     Row Name 11/20/20 1423       Personal Safety    Feels Unsafe at Home or Work/School  no    Feels Threatened by Someone  no    Does Anyone Try to Keep You From Having Contact with Others or Doing Things Outside Your Home?  no    Physical Signs of Abuse Present  no        Legal    No documentation.       Substance Abuse    No documentation.       Patient Forms    No documentation.           Shannon Epley, RN

## 2020-11-20 NOTE — CONSULTS
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 We were having a conversation but she received a phone call from her granddaughter.

## 2020-11-20 NOTE — NURSING NOTE
@1830 Patient's sister, who works in endoscopy has visited patient at least 2x today and I told patient during the day before 2nd visit that sister is the designated visitor since she visited and patients are allowed 1 visitor who has to be the same one each day on this admission.   Patient is now asking if her daughter can visit.  I told her I would call the .  I spoke to Sarahi,  and told her of the above and she reiterated that sister is the designated visitor for now since she has been in the room visiting patient.  Patient was told this and states that her sister did not know the visiting policy because she works in endoscopy and her rules are different.

## 2020-11-20 NOTE — PROGRESS NOTES
"[facility] Clinical Pharmacy Services: Vancomycin Pharmacokinetic Note (Follow-Up Note)    Patient Name: Kinga Taylor  Indication: Sepsis  Target AUC24 level: 400-600  Consulting Provider: Dr. Ash (Dr. Damon is following for ID)  Allergies:   Allergies as of 11/19/2020 - Reviewed 11/19/2020   Allergen Reaction Noted   • Aldomet [methyldopa] Swelling 01/30/2017   • Dilaudid [hydromorphone hcl] Itching 01/30/2017   • Morphine and related Itching 01/30/2017     Duration of Therapy: 5 days    Other Antimicrobials: Cefepime 2g IV q24h    Current Vancomycin Dose: Intermittent dosing     Relevant clinical data and objective history reviewed:  50 y.o. female 160 cm (62.99\") 60.3 kg (133 lb)    Vitals:    11/19/20 2255 11/20/20 0300 11/20/20 0852 11/20/20 1132   BP: 106/61 107/62 126/82    BP Location: Right arm Right arm Right arm    Pulse: 85 80 68    Resp: 16 16 16    Temp: 98.5 °F (36.9 °C)  97.5 °F (36.4 °C)    TempSrc: Oral  Oral    SpO2: 97% 100% 91%      Creatinine   Date Value Ref Range Status   11/20/2020 3.27 (H) 0.57 - 1.00 mg/dL Final   11/19/2020 4.20 (H) 0.57 - 1.00 mg/dL Final     BUN   Date Value Ref Range Status   11/20/2020 29 (H) 6 - 20 mg/dL Final     Estimated Creatinine Clearance: 19.4 mL/min (A) (by C-G formula based on SCr of 3.27 mg/dL (H)).    Lab Results   Component Value Date    WBC 3.49 11/20/2020     Temp Readings from Last 3 Encounters:   11/20/20 97.5 °F (36.4 °C) (Oral)     Baseline culture/source/susceptibility:  11/19: BCx-NGTD  11/19: UCx-negative  11/19: Bcx-pending    Vancomycin Dosing History:   11/19: Vancomycin 1250 mg (20 mg/kg) loading dose IV once @ 1843   11/20 @ 0653: Random vancomycin level=21.7 (~12 hr level)    Assessment:  Bayesian analysis of the most recent level(s) using [employer] provides the following patient-specific pharmacokinetic parameters:              CL:       0.958 L/hr              Vd:       28.9 L              T1/2:    22.2 hrs  Due to patient's " unstable renal function, will dose intermittently using random levels to better direct patient care.      Recommendations/Plan:  · Based on vancomycin level of 21.7 mcg/mL, will give one dose of 500 mg today, then get another random level tomorrow morning with am labs to determine further dosing.  · Patient has had a kidney transplant, and currently has acute-on-chronic kidney disease. Will monitor closely and dose conservatively.  · Will monitor serum creatinine every 24 hours for the first 3 days then at least every 48 hours per dosing recommendations. SCr is trending down from yesterday, and was 3.27 this morning (4.2-->3.27)  · Pharmacy will continue to follow daily while on vancomycin and adjust as needed.     Thank you for allowing me to participate in your patient's care. Please call with any concerns or questions.   Anna Llamas, Pharm.D., [facility]   Clinical Staff Pharmacist  Phone Extension #[phone]

## 2020-11-21 LAB
ANION GAP SERPL CALCULATED.3IONS-SCNC: 10.1 MMOL/L (ref 5–15)
BASOPHILS # BLD AUTO: 0.01 10*3/MM3 (ref 0–0.2)
BASOPHILS NFR BLD AUTO: 0.2 % (ref 0–1.5)
BH BB BLOOD EXPIRATION DATE: NORMAL
BH BB BLOOD EXPIRATION DATE: NORMAL
BH BB BLOOD TYPE BARCODE: 6200
BH BB BLOOD TYPE BARCODE: 6200
BH BB DISPENSE STATUS: NORMAL
BH BB DISPENSE STATUS: NORMAL
BH BB PRODUCT CODE: NORMAL
BH BB PRODUCT CODE: NORMAL
BH BB UNIT NUMBER: NORMAL
BH BB UNIT NUMBER: NORMAL
BUN SERPL-MCNC: 23 MG/DL (ref 6–20)
BUN/CREAT SERPL: 8.5 (ref 7–25)
CALCIUM SPEC-SCNC: 7.9 MG/DL (ref 8.6–10.5)
CHLORIDE SERPL-SCNC: 105 MMOL/L (ref 98–107)
CO2 SERPL-SCNC: 21.9 MMOL/L (ref 22–29)
CREAT SERPL-MCNC: 2.71 MG/DL (ref 0.57–1)
CROSSMATCH INTERPRETATION: NORMAL
CROSSMATCH INTERPRETATION: NORMAL
DEPRECATED RDW RBC AUTO: 51 FL (ref 37–54)
EOSINOPHIL # BLD AUTO: 0.02 10*3/MM3 (ref 0–0.4)
EOSINOPHIL NFR BLD AUTO: 0.5 % (ref 0.3–6.2)
ERYTHROCYTE [DISTWIDTH] IN BLOOD BY AUTOMATED COUNT: 16.7 % (ref 12.3–15.4)
GFR SERPL CREATININE-BSD FRML MDRD: 18 ML/MIN/1.73
GLUCOSE BLDC GLUCOMTR-MCNC: 171 MG/DL (ref 70–130)
GLUCOSE BLDC GLUCOMTR-MCNC: 178 MG/DL (ref 70–130)
GLUCOSE BLDC GLUCOMTR-MCNC: 69 MG/DL (ref 70–130)
GLUCOSE BLDC GLUCOMTR-MCNC: 99 MG/DL (ref 70–130)
GLUCOSE SERPL-MCNC: 119 MG/DL (ref 65–99)
HCT VFR BLD AUTO: 28.4 % (ref 34–46.6)
HGB BLD-MCNC: 9.1 G/DL (ref 12–15.9)
IMM GRANULOCYTES # BLD AUTO: 0.03 10*3/MM3 (ref 0–0.05)
IMM GRANULOCYTES NFR BLD AUTO: 0.7 % (ref 0–0.5)
LYMPHOCYTES # BLD AUTO: 1.06 10*3/MM3 (ref 0.7–3.1)
LYMPHOCYTES NFR BLD AUTO: 24.3 % (ref 19.6–45.3)
MCH RBC QN AUTO: 26.8 PG (ref 26.6–33)
MCHC RBC AUTO-ENTMCNC: 32 G/DL (ref 31.5–35.7)
MCV RBC AUTO: 83.8 FL (ref 79–97)
MONOCYTES # BLD AUTO: 0.34 10*3/MM3 (ref 0.1–0.9)
MONOCYTES NFR BLD AUTO: 7.8 % (ref 5–12)
NEUTROPHILS NFR BLD AUTO: 2.91 10*3/MM3 (ref 1.7–7)
NEUTROPHILS NFR BLD AUTO: 66.5 % (ref 42.7–76)
NRBC BLD AUTO-RTO: 0 /100 WBC (ref 0–0.2)
PLATELET # BLD AUTO: 165 10*3/MM3 (ref 140–450)
PMV BLD AUTO: 8.9 FL (ref 6–12)
POTASSIUM SERPL-SCNC: 3.5 MMOL/L (ref 3.5–5.2)
RBC # BLD AUTO: 3.39 10*6/MM3 (ref 3.77–5.28)
SODIUM SERPL-SCNC: 137 MMOL/L (ref 136–145)
UNIT  ABO: NORMAL
UNIT  ABO: NORMAL
UNIT  RH: NORMAL
UNIT  RH: NORMAL
VANCOMYCIN SERPL-MCNC: 17.9 MCG/ML (ref 5–40)
WBC # BLD AUTO: 4.37 10*3/MM3 (ref 3.4–10.8)

## 2020-11-21 PROCEDURE — 63710000001 SIROLIMUS 0.5 MG TABLET: Performed by: HOSPITALIST

## 2020-11-21 PROCEDURE — 82962 GLUCOSE BLOOD TEST: CPT

## 2020-11-21 PROCEDURE — 80202 ASSAY OF VANCOMYCIN: CPT | Performed by: HOSPITALIST

## 2020-11-21 PROCEDURE — 85025 COMPLETE CBC W/AUTO DIFF WBC: CPT | Performed by: HOSPITALIST

## 2020-11-21 PROCEDURE — 63710000001 INSULIN LISPRO (HUMAN) PER 5 UNITS: Performed by: HOSPITALIST

## 2020-11-21 PROCEDURE — 63710000001 PREDNISONE PER 5 MG: Performed by: HOSPITALIST

## 2020-11-21 PROCEDURE — 80048 BASIC METABOLIC PNL TOTAL CA: CPT | Performed by: HOSPITALIST

## 2020-11-21 RX ORDER — SODIUM CHLORIDE 0.9 % (FLUSH) 0.9 %
10 SYRINGE (ML) INJECTION EVERY 12 HOURS SCHEDULED
Status: CANCELLED | OUTPATIENT
Start: 2020-11-21

## 2020-11-21 RX ORDER — LEVOFLOXACIN 500 MG/1
500 TABLET, FILM COATED ORAL EVERY OTHER DAY
Status: DISCONTINUED | OUTPATIENT
Start: 2020-11-21 | End: 2020-11-22 | Stop reason: HOSPADM

## 2020-11-21 RX ORDER — SODIUM CHLORIDE 0.9 % (FLUSH) 0.9 %
10 SYRINGE (ML) INJECTION AS NEEDED
Status: CANCELLED | OUTPATIENT
Start: 2020-11-21

## 2020-11-21 RX ORDER — HEPARIN SODIUM (PORCINE) LOCK FLUSH IV SOLN 100 UNIT/ML 100 UNIT/ML
3 SOLUTION INTRAVENOUS DAILY PRN
Status: CANCELLED | OUTPATIENT
Start: 2020-11-21

## 2020-11-21 RX ORDER — SODIUM CHLORIDE 0.9 % (FLUSH) 0.9 %
20 SYRINGE (ML) INJECTION AS NEEDED
Status: CANCELLED | OUTPATIENT
Start: 2020-11-21

## 2020-11-21 RX ADMIN — SODIUM BICARBONATE 1300 MG: 650 TABLET ORAL at 08:55

## 2020-11-21 RX ADMIN — PREDNISONE 5 MG: 5 TABLET ORAL at 20:37

## 2020-11-21 RX ADMIN — FAMOTIDINE 20 MG: 20 TABLET, FILM COATED ORAL at 08:55

## 2020-11-21 RX ADMIN — LEVOFLOXACIN 500 MG: 500 TABLET, FILM COATED ORAL at 20:37

## 2020-11-21 RX ADMIN — ACETAMINOPHEN 650 MG: 325 TABLET, FILM COATED ORAL at 22:45

## 2020-11-21 RX ADMIN — SIROLIMUS 2 MG: 0.5 TABLET, SUGAR COATED ORAL at 23:00

## 2020-11-21 RX ADMIN — FAMOTIDINE 20 MG: 20 TABLET, FILM COATED ORAL at 20:38

## 2020-11-21 RX ADMIN — BUPROPION HYDROCHLORIDE 300 MG: 300 TABLET, EXTENDED RELEASE ORAL at 08:55

## 2020-11-21 RX ADMIN — PARICALCITOL 2 MCG: 1 CAPSULE, LIQUID FILLED ORAL at 20:37

## 2020-11-21 RX ADMIN — SODIUM BICARBONATE 1300 MG: 650 TABLET ORAL at 16:48

## 2020-11-21 RX ADMIN — ATENOLOL 50 MG: 50 TABLET ORAL at 22:43

## 2020-11-21 RX ADMIN — SODIUM BICARBONATE 1300 MG: 650 TABLET ORAL at 20:37

## 2020-11-21 RX ADMIN — INSULIN LISPRO 2 UNITS: 100 INJECTION, SOLUTION INTRAVENOUS; SUBCUTANEOUS at 08:55

## 2020-11-21 RX ADMIN — ALLOPURINOL 100 MG: 100 TABLET ORAL at 20:38

## 2020-11-21 RX ADMIN — HYDROXYZINE HYDROCHLORIDE 25 MG: 25 TABLET ORAL at 20:37

## 2020-11-21 RX ADMIN — SODIUM CHLORIDE, PRESERVATIVE FREE 10 ML: 5 INJECTION INTRAVENOUS at 08:55

## 2020-11-21 NOTE — PLAN OF CARE
Goal Outcome Evaluation:  Plan of Care Reviewed With: patient  Progress: no change  Outcome Summary: Hgb low and blood transfusion started.  Wynn place for urine retention.  Up to bedside commode multiple times.  Med ordered for chronic itching.

## 2020-11-21 NOTE — PLAN OF CARE
Goal Outcome Evaluation:  Plan of Care Reviewed With: patient  Progress: no change  Outcome Summary: Patient is a 50 year old patient admitted for sepsis with acute renal failure, septic shock, and uti.  Patient denies any acute distress or discomfort.  Patient in and out caths self at home, liu catheter placed upon admission.  Draining clear yellow urine.  Patient currently scheduled to receive IV abx per ID.  Unable to receive at this time related to loss of IV access.  Patient denies any acute distress or discomfort.  Sitting in chair at bedside visiting with family.  Will continue to monitor.

## 2020-11-21 NOTE — PROGRESS NOTES
I was asked to place a central line.  I reviewed the patient's chart.  The only intravenous medications she requires are vancomycin and cefepime.  The plan outlined by Dr. Welsh was to switch to oral antibiotics tomorrow.  We spoke on the phone, we think the most appropriate step in management will be to switch to oral antibiotics today rather than place a central line for 1 day of antibiotics.  Please call me if there are other issues that would make IV access more pressing and appropriate.

## 2020-11-21 NOTE — PROGRESS NOTES
"Daily progress note    Chief complaint  Doing better   No new complaints    History of present illness  50-year-old white female with history of status post kidney transplant diabetes mellitus hypertension hyperlipidemia and chronic kidney disease stage IV presented to [facility] emergency room with fever chills abdominal pain nausea vomiting diarrhea started Monday.  Patient has no appetite for several months.  Patient has lost 40 pounds in 3 months.  Patient denies any chest pain increased shortness of breath.  Patient work-up in ER revealed sepsis with acute kidney injury and also found to be hypocalcemic.  Patient admitted for management.  At the time of interview she is in no distress and wants to eat.      REVIEW OF SYSTEMS  Unremarkable     PHYSICAL EXAM  Blood pressure 127/69, pulse 61, temperature 97.7 °F (36.5 °C), temperature source Oral, resp. rate 18, height 160 cm (62.99\"), weight 60.3 kg (133 lb), last menstrual period 11/19/2020, SpO2 95 %.    GENERAL: Appears older than stated age, non-toxic appearing, not distressed  HENT: normocephalic, atraumatic  EYES: no scleral icterus, PERRL, pale sclera  CV: regular rhythm, regular rate, no murmur  RESPIRATORY: normal effort, CTAB  ABDOMEN: soft, normal bowel sounds, nontender  MUSCULOSKELETAL: no deformity  NEURO: alert, moves all extremities, follows commands, mental status normal/baseline  SKIN: warm, dry, no rash   Psych: Appropriate mood and affect    LAB RESULTS  Lab Results (last 24 hours)     Procedure Component Value Units Date/Time    POC Glucose Once [[id number]]  (Abnormal) Collected: 11/21/20 1227    Specimen: Blood Updated: 11/21/20 1234     Glucose 69 mg/dL     Vancomycin, Random [[id number]]  (Normal) Collected: 11/21/20 1002    Specimen: Blood from Arm, Right Updated: 11/21/20 1232     Vancomycin Random 17.90 mcg/mL     Basic Metabolic Panel [[id number]]  (Abnormal) Collected: 11/21/20 1002    Specimen: Blood Updated: 11/21/20 1105   "     Glucose 119 mg/dL      BUN 23 mg/dL      Creatinine 2.71 mg/dL      Sodium 137 mmol/L      Potassium 3.5 mmol/L      Chloride 105 mmol/L      CO2 21.9 mmol/L      Calcium 7.9 mg/dL      eGFR Non African Amer 18 mL/min/1.73      BUN/Creatinine Ratio 8.5     Anion Gap 10.1 mmol/L     Narrative:      GFR Normal >60  Chronic Kidney Disease <60  Kidney Failure <15      CBC & Differential [[id number]]  (Abnormal) Collected: 11/21/20 1002    Specimen: Blood Updated: 11/21/20 1042    Narrative:      The following orders were created for panel order CBC & Differential.  Procedure                               Abnormality         Status                     ---------                               -----------         ------                     CBC Auto Differential[[id number]]        Abnormal            Final result                 Please view results for these tests on the individual orders.    CBC Auto Differential [[id number]]  (Abnormal) Collected: 11/21/20 1002    Specimen: Blood Updated: 11/21/20 1042     WBC 4.37 10*3/mm3      RBC 3.39 10*6/mm3      Hemoglobin 9.1 g/dL      Hematocrit 28.4 %      MCV 83.8 fL      MCH 26.8 pg      MCHC 32.0 g/dL      RDW 16.7 %      RDW-SD 51.0 fl      MPV 8.9 fL      Platelets 165 10*3/mm3      Neutrophil % 66.5 %      Lymphocyte % 24.3 %      Monocyte % 7.8 %      Eosinophil % 0.5 %      Basophil % 0.2 %      Immature Grans % 0.7 %      Neutrophils, Absolute 2.91 10*3/mm3      Lymphocytes, Absolute 1.06 10*3/mm3      Monocytes, Absolute 0.34 10*3/mm3      Eosinophils, Absolute 0.02 10*3/mm3      Basophils, Absolute 0.01 10*3/mm3      Immature Grans, Absolute 0.03 10*3/mm3      nRBC 0.0 /100 WBC     POC Glucose Once [[id number]]  (Abnormal) Collected: 11/21/20 0621    Specimen: Blood Updated: 11/21/20 0623     Glucose 178 mg/dL     POC Glucose Once [[id number]]  (Normal) Collected: 11/20/20 2054    Specimen: Blood Updated: 11/20/20 2056     Glucose 73 mg/dL     Blood Culture - Blood, Arm,  Right [[id number]] Collected: 11/19/20 1741    Specimen: Blood from Arm, Right Updated: 11/20/20 1800     Blood Culture No growth at 24 hours    POC Glucose Once [[id number]]  (Normal) Collected: 11/20/20 1727    Specimen: Blood Updated: 11/20/20 1731     Glucose 97 mg/dL     Blood Culture - Blood, Arm, Right [[id number]] Collected: 11/19/20 1347    Specimen: Blood from Arm, Right Updated: 11/20/20 1415     Blood Culture No growth at 24 hours    Blood Culture - Blood, Hand, Right [[id number]] Collected: 11/19/20 1347    Specimen: Blood from Hand, Right Updated: 11/20/20 1415     Blood Culture No growth at 24 hours        Imaging Results (Last 24 Hours)     ** No results found for the last 24 hours. **          Current Facility-Administered Medications:   •  acetaminophen (TYLENOL) tablet 650 mg, 650 mg, Oral, Q4H PRN, Lázaro Ash MD, 650 mg at 11/19/20 2152  •  allopurinol (ZYLOPRIM) tablet 100 mg, 100 mg, Oral, Nightly, Lázaro Ash MD, 100 mg at 11/20/20 2118  •  atenolol (TENORMIN) tablet 50 mg, 50 mg, Oral, Q24H, Lázaro Ash MD, 50 mg at 11/20/20 2119  •  buPROPion XL (WELLBUTRIN XL) 24 hr tablet 300 mg, 300 mg, Oral, Daily, Lázaro Ash MD, 300 mg at 11/21/20 0855  •  cefepime (MAXIPIME) 2 g/100 mL 0.9% NS (mbp), 2 g, Intravenous, Q24H, Lázaro Ash MD, Last Rate: 25 mL/hr at 11/20/20 1204, 2 g at 11/20/20 1204  •  famotidine (PEPCID) tablet 20 mg, 20 mg, Oral, BID, Lázaro Ash MD, 20 mg at 11/21/20 0855  •  hydrOXYzine (ATARAX) tablet 25 mg, 25 mg, Oral, Nightly, Lázaro Ash MD  •  insulin glargine (LANTUS) injection 10 Units, 10 Units, Subcutaneous, Nightly, Lázaro Ash MD  •  insulin lispro (humaLOG) injection 0-7 Units, 0-7 Units, Subcutaneous, TID AC, Lázaro Ash MD, 2 Units at 11/21/20 0855  •  ondansetron (ZOFRAN) injection 4 mg, 4 mg, Intravenous, Q6H PRN, Lázaro Ash MD, 4 mg at 11/19/20 1842  •  ondansetron ODT (ZOFRAN-ODT) disintegrating tablet 4 mg, 4 mg, Oral, Q6H PRN, Lázaro Ash,  MD  •  paricalcitol (ZEMPLAR) capsule 2 mcg, 2 mcg, Oral, Nightly, Kalyani Ash MD, 2 mcg at 11/20/20 2118  •  Pharmacy to dose vancomycin, , Does not apply, Continuous PRN, Kalyani Ash MD  •  predniSONE (DELTASONE) tablet 5 mg, 5 mg, Oral, Nightly, Kalyani Ash MD, 5 mg at 11/20/20 2118  •  sirolimus (RAPAMUNE) tablet 2 mg, 2 mg, Oral, Daily, Kalyani Ash MD, 2 mg at 11/20/20 2118  •  sodium bicarbonate tablet 1,300 mg, 1,300 mg, Oral, TID, Kalyani Ash MD, 1,300 mg at 11/21/20 0855  •  [COMPLETED] Insert peripheral IV, , , Once **AND** sodium chloride 0.9 % flush 10 mL, 10 mL, Intravenous, PRN, Rachelle Han, APRN, 10 mL at 11/21/20 0855  •  vancomycin 500 mg/100 mL 0.9% NS IVPB (mbp), 500 mg, Intravenous, Once, Cintia Damon MD  •  Vancomycin Pharmacy Intermittent Dosing, , Does not apply, Daily, Kalyani Ash MD     ASSESSMENT  Acute kidney injury  Chronic kidney disease stage IV  Status post kidney transplant in 1995  Sepsis source not clear  Diabetes mellitus with low blood sugar  Hypertension  Hyperlipidemia  Chronic anemia with drop in H&H  Gastroesophageal reflux disease    PLAN  CPM  Discontinue IV fluids  IV antibiotics per infectious disease  Transfuse PRN  Nephrology consult appreciated  Infectious disease to follow patient  Adjust home medications  Stress ulcer DVT prophylaxis  Supportive care  Discussed with nursing staff  Discharge planning    KALYANI ASH MD

## 2020-11-21 NOTE — PROGRESS NOTES
"[facility] Clinical Pharmacy Services: Vancomycin Pharmacokinetic Note (Follow-Up Note)    Patient Name: Kinga Taylor  Indication: Sepsis  Target AUC24 level: 400-600  Consulting Provider: Dr. Ash (Dr. Damon is following for ID)  Allergies:   Allergies as of 11/19/2020 - Reviewed 11/19/2020   Allergen Reaction Noted   • Aldomet [methyldopa] Swelling 01/30/2017   • Dilaudid [hydromorphone hcl] Itching 01/30/2017   • Morphine and related Itching 01/30/2017     Duration of Therapy: 5 days    Other Antimicrobials: Cefepime 2g IV q24h    Current Vancomycin Dose: Intermittent dosing     Relevant clinical data and objective history reviewed:  50 y.o. female 160 cm (62.99\") 60.3 kg (133 lb)    Vitals:    11/21/20 0752   BP: 127/69   Pulse: 61   Resp: 18   Temp: 97.7 °F (36.5 °C)   SpO2:      Creatinine   Date Value Ref Range Status   11/21/2020 2.71 (H) 0.57 - 1.00 mg/dL Final   11/20/2020 3.27 (H) 0.57 - 1.00 mg/dL Final   11/19/2020 4.20 (H) 0.57 - 1.00 mg/dL Final     BUN   Date Value Ref Range Status   11/21/2020 23 (H) 6 - 20 mg/dL Final     Estimated Creatinine Clearance: 23.4 mL/min (A) (by C-G formula based on SCr of 2.71 mg/dL (H)).    Lab Results   Component Value Date    WBC 4.37 11/21/2020     Baseline culture/source/susceptibility:  11/19: BCx-NGTD  11/19: UCx-25K mixed gosia  11/19: Bcx-NGTD    Vancomycin Dosing History:   11/19: Vancomycin 1250 mg (20 mg/kg) loading dose IV once @ 1843   11/20 @ 0653: Random vancomycin level=21.7 (~12 hr level)  11/20: Vancomycin 500 mg IV x 1 @ 1530   11/21 @ 1002: Random vancomycin level = 17.9     Assessment:  Bayesian analysis of the most recent level(s) using [employer] provides the following patient-specific pharmacokinetic parameters:              CL:       1.12 L/hr              Vd:       29 L              T1/2:    20.6 hrs (good model fit)  [employer] is predicting AUC of 451 mg/L.hr and steady state trough of 14.3 mcg/mL with vancomycin 500 mg (8.3 mg/kg) " IV q24h dosing regimen.     Due to patient's unstable renal function, will dose intermittently using random levels to better direct patient care.      Recommendations/Plan:  · Based on vancomycin level of 17.9 mcg/mL, will give one dose of 500 mg today, then get another random level tomorrow morning with am labs to determine further dosing.  · Patient has had a kidney transplant, and currently has acute-on-chronic kidney disease. Will monitor closely and dose conservatively.  · Will monitor serum creatinine every 24 hours for the first 3 days then at least every 48 hours per dosing recommendations. SCr is trending down from yesterday, and was 2.71 this morning (4.2-->3.27-->2.71)  · Pharmacy will continue to follow daily while on vancomycin and adjust as needed.     Thank you for allowing me to participate in your patient's care. Please call with any concerns or questions.     Sharon Yeager, PharmD, BCPS   Clinical Staff Pharmacist

## 2020-11-21 NOTE — PROGRESS NOTES
"  Infectious Diseases Progress Note    Cintia Damon MD     [facility]  [place]: 2 days  Patient Identification:  Name: Kinga Taylor  Age: 50 y.o.  Sex: female  :  1970  MRN: [id number]         Primary Care Physician: Alfredo Martinez MD            Subjective: Feeling much better when she come into the hospital.  Later today she started having discomfort because of the Wynn catheter.  IV access has not been an issue and despite several attempts to secure IV access was unsuccessful.  Given her chronic renal disease PICC line is not  an option and other form of proximal IV access is not considered needed by nephrology service.  Antibiotics have been switched to oral Levaquin as per nephrology service.  Interval History: See consultation note.    Objective:    Scheduled Meds:allopurinol, 100 mg, Oral, Nightly  atenolol, 50 mg, Oral, Q24H  buPROPion XL, 300 mg, Oral, Daily  cefepime, 2 g, Intravenous, Q24H  famotidine, 20 mg, Oral, BID  hydrOXYzine, 25 mg, Oral, Nightly  insulin glargine, 10 Units, Subcutaneous, Nightly  insulin lispro, 0-7 Units, Subcutaneous, TID AC  paricalcitol, 2 mcg, Oral, Nightly  predniSONE, 5 mg, Oral, Nightly  sirolimus, 2 mg, Oral, Daily  sodium bicarbonate, 1,300 mg, Oral, TID  Vancomycin Pharmacy Intermittent Dosing, , Does not apply, Daily      Continuous Infusions:Pharmacy to dose vancomycin,         Vital signs in last 24 hours:  Temp:  [96.4 °F (35.8 °C)-97.7 °F (36.5 °C)] 97.7 °F (36.5 °C)  Heart Rate:  [61-84] 61  Resp:  [16-18] 18  BP: (114-139)/(65-82) 127/69    Intake/Output:    Intake/Output Summary (Last 24 hours) at 2020 0821  Last data filed at 2020 0302  Gross per 24 hour   Intake 2097.92 ml   Output 3650 ml   Net -1552.08 ml       Exam:  /69 (BP Location: Right arm, Patient Position: Lying)   Pulse 61   Temp 97.7 °F (36.5 °C) (Oral)   Resp 18   Ht 160 cm (62.99\")   Wt 60.3 kg (133 lb)   LMP 2020   SpO2 95%   BMI 23.57 " kg/m²   Patient is examined using the personal protective equipment as per guidelines from infection control for this particular patient as enacted.  Hand washing was performed before and after patient interaction.  General Appearance:    Alert, cooperative, no distress, AAOx3                          Head:    Normocephalic, without obvious abnormality, atraumatic                           Eyes:    PERRL, conjunctivae/corneas clear, EOM's intact, both eyes                         Throat:   Lips, tongue, gums normal; oral mucosa pink and moist                           Neck:   Supple, symmetrical, trachea midline, no JVD                         Lungs:    Clear to auscultation bilaterally, respirations unlabored                 Chest Wall:    No tenderness or deformity                          Heart:    Regular rate and rhythm, S1 and S2 normal                  Abdomen:   Soft mild right-sided tenderness, Wynn catheter in place.                 extremities:   Extremities normal, atraumatic, no cyanosis or edema                        Pulses:   Pulses palpable in all extremities                            Skin:   Skin is warm and dry,  no rashes or palpable lesions                  Neurologic: Grossly nonfocal       Data Review:    I reviewed the patient's new clinical results.  Results from last 7 days   Lab Units 11/20/20  0945 11/19/20  1052   WBC 10*3/mm3 3.49 7.36   HEMOGLOBIN g/dL 6.7* 8.9*   PLATELETS 10*3/mm3 182 330     Results from last 7 days   Lab Units 11/20/20  0653 11/19/20  1052   SODIUM mmol/L 133* 132*   POTASSIUM mmol/L 4.2 3.4*   CHLORIDE mmol/L 104 94*   CO2 mmol/L 16.7* 24.6   BUN mg/dL 29* 34*   CREATININE mg/dL 3.27* 4.20*   CALCIUM mg/dL 7.7* 9.6   GLUCOSE mg/dL 357* 38*     Ct Abdomen Pelvis Without Contrast    Result Date: 11/20/2020  1. Evaluation is limited without contrast. There is a markedly distended urinary bladder demonstrating diverticula with mild to moderate transplant kidney  hydronephrosis. Urinary catheterization is recommended. There is also cortical thinning of the transplant kidney. There is a perinephric fluid/phlegmonous collection as described above. Follow-up with dedicated sonogram of the transplant kidney is recommended. 2. There is a large amount of fat seen in the peripelvic region of the kidney as well as diffusely within the pelvis and this may represent pelvic lipomatosis. Correlation with any prior cross-sectional imaging of the abdomen is recommended. A lobulated hypoattenuating lesion within the midabdomen is favored to represent the patient's displaced right ovary. The uterus morphology may be suggestive of an arcuate/bicornuate uterus. Again, correlation with prior cross-sectional imaging and/or follow-up is recommended.  Radiation dose reduction techniques were utilized, including automated exposure control and exposure modulation based on body size.       Xr Chest Ap    Result Date: 11/19/2020   No focal pulmonary consolidation. Borderline heart size. Follow-up as indications persist.    This report was finalized on 11/19/2020 12:49 PM by Dr. Carlos Sheffield M.D.          Assessment:    Sepsis with acute renal failure without septic shock (CMS/HCC)  1-evolving sepsis secondary to  2-urinary tract infection with possible intra-abdominal process such as intra-abdominal abscess which could range from transplant kidney hydronephrosis with perinephric abscess versus hepatic abscess versus diverticular abscess abutting urinary bladder causing sympathetic pyuria-CT scan of the abdomen and pelvis is concerning for moderate transplanted kidney hydronephrosis with cortical thickening, markedly distended bladder demonstrating diverticula, cortical thinning of transplant and perinephric fluid/phlegmonous collection along the inferior lateral aspect of the transplanted kidney ranging 4 x 2 cm in size.  Other intra-abdominal processes are ruled out.  3-severe  hyperglycemia  4-immunocompromised host  5-history of kidney transplant  6-acute on chronic renal failure  7-severe anemia  8-Wynn catheter related discomfort  9-issues with IV access.        Recommendations/Discussions:  · Since she is having difficulty and discomfort because of Wynn I think the best course of action would be to repeat CT scan of her abdomen to follow-up on urinary retention and hydronephrosis that was noticed.  · Given the CT scan finding of moderate transplanted kidney hydronephrosis along with perinephric fluid/phlegmonous collection along the inferior lateral aspect of the transplant kidney as noted on this for CT scan I would recommend urology consultation.  · Switch of IV antibiotics to oral Levaquin noted.  Discussed with nephrology service.  · Since we do not have culture results would follow the clinical course on oral Levaquin switch because of the IV access issues.  Duration of antibiotic treatment is open ended as hydronephrosis of the transplant kidney and perinephric phlegmon/fluid collection adjacent to the transplant kidney requires urology evaluation to see if any intervention is needed as part of the adjunct treatment besides antibiotic therapy.  Intervention could range from percutaneous drainage of the perinephric fluid collection to open surgical drainage.  Either way the sample can be sent for culture results to achieve microbiological diagnosis, which would be helpful to decide specific antibiotic therapy.    Cintia Damon MD  11/21/2020  08:21 EST    Much of this encounter note is an electronic transcription/translation of spoken language to printed text. The electronic translation of spoken language may permit erroneous, or at times, nonsensical words or phrases to be inadvertently transcribed; Although I have reviewed the note for such errors, some may still exist

## 2020-11-21 NOTE — PROGRESS NOTES
NEPHROLOGY PROGRESS NOTE    PATIENT IDENTIFICATION:   Name:  Kinga Taylor      MRN:  [id number]     50 y.o.  female             Reason for visit: YOEL    SUBJECTIVE:   Seen and examined.  Denies shortness of air or chest pain.  Feeling better.  No fever.  OBJECTIVE:  Vitals:    11/20/20 2056 11/20/20 2126 11/21/20 0130 11/21/20 0752   BP: 132/69 138/70 139/82 127/69   BP Location:    Right arm   Patient Position:    Lying   Pulse: 75 78 72 61   Resp:  18 18 18   Temp: 97.3 °F (36.3 °C) 97.3 °F (36.3 °C) 97.6 °F (36.4 °C) 97.7 °F (36.5 °C)   TempSrc: Oral  Oral Oral   SpO2:  95%     Weight:       Height:               Body mass index is 23.57 kg/m².    Intake/Output Summary (Last 24 hours) at 11/21/2020 1012  Last data filed at 11/21/2020 0302  Gross per 24 hour   Intake 2097.92 ml   Output 3650 ml   Net -1552.08 ml         Exam:  GEN:  No distress, appears stated age  EYES:   Anicteric sclera  ENT:    External ears/nose normal, MM are moist  NECK:  No adenopathy, JVP none  LUNGS: Normal chest on inspection; not labored  CV:  Normal S1S2, without murmur  ABD:  Non-tender, non-distended, no hepatosplenomegaly, +BS  EXT:  No edema; no cyanosis; clubbing    Scheduled meds:  allopurinol, 100 mg, Oral, Nightly  atenolol, 50 mg, Oral, Q24H  buPROPion XL, 300 mg, Oral, Daily  cefepime, 2 g, Intravenous, Q24H  famotidine, 20 mg, Oral, BID  hydrOXYzine, 25 mg, Oral, Nightly  insulin glargine, 10 Units, Subcutaneous, Nightly  insulin lispro, 0-7 Units, Subcutaneous, TID AC  paricalcitol, 2 mcg, Oral, Nightly  predniSONE, 5 mg, Oral, Nightly  sirolimus, 2 mg, Oral, Daily  sodium bicarbonate, 1,300 mg, Oral, TID  Vancomycin Pharmacy Intermittent Dosing, , Does not apply, Daily      IV meds:                      Pharmacy to dose vancomycin,         Data Review:    Results from last 7 days   Lab Units 11/20/20  0653 11/19/20  1052   SODIUM mmol/L 133* 132*   POTASSIUM mmol/L 4.2 3.4*   CHLORIDE mmol/L 104 94*   CO2  mmol/L 16.7* 24.6   BUN mg/dL 29* 34*   CREATININE mg/dL 3.27* 4.20*   CALCIUM mg/dL 7.7* 9.6   BILIRUBIN mg/dL 0.2 0.4   ALK PHOS U/L 83 112   ALT (SGPT) U/L 9 11   AST (SGOT) U/L 18 28   GLUCOSE mg/dL 357* 38*       Estimated Creatinine Clearance: 19.4 mL/min (A) (by C-G formula based on SCr of 3.27 mg/dL (H)).          Results from last 7 days   Lab Units 11/20/20  0945 11/19/20  1052   WBC 10*3/mm3 3.49 7.36   HEMOGLOBIN g/dL 6.7* 8.9*   PLATELETS 10*3/mm3 182 330                   ASSESSMENT:     Sepsis with acute renal failure without septic shock (CMS/HCC)        -Acute kidney injury on chronic kidney disease stage IV: Likely due to sepsis and UTI.  Her allograft is very tender.  She does have history of recurrent UTI.    Cultures has been negative so far.  on antibiotic.  Will continue Rapamune and prednisone     -History of kidney transplantation in January 1995 with baseline creatinine 2.4 mg/dL  -Urinary tract infection: Patient was started on cefepime and vancomycin.  Awaiting for urine culture and blood culture  -Immunosuppressive medication  -Hypokalemia  -Hyponatremia     Plan:    Awaiting kidney function today.  Blood cultures and urine cultures are negative so far however I do think that patient had  UTI and unlikely she has rejection.    She feels much better today.  I will continue IV antibiotic at least 1 more day.  If kidney function is better tomorrow patient should be able to be discharged on oral antibiotic to follow-up with her nephrologist in 7 days  Surveillance labs    Lee Welsh MD  11/21/2020    10:12 EST

## 2020-11-22 ENCOUNTER — APPOINTMENT (OUTPATIENT)
Dept: CT IMAGING | Facility: HOSPITAL | Age: 50
End: 2020-11-22

## 2020-11-22 VITALS
HEART RATE: 68 BPM | TEMPERATURE: 97.6 F | DIASTOLIC BLOOD PRESSURE: 82 MMHG | SYSTOLIC BLOOD PRESSURE: 151 MMHG | RESPIRATION RATE: 18 BRPM | WEIGHT: 133 LBS | HEIGHT: 63 IN | OXYGEN SATURATION: 98 % | BODY MASS INDEX: 23.57 KG/M2

## 2020-11-22 LAB
ANION GAP SERPL CALCULATED.3IONS-SCNC: 8.9 MMOL/L (ref 5–15)
BASOPHILS # BLD AUTO: 0.01 10*3/MM3 (ref 0–0.2)
BASOPHILS NFR BLD AUTO: 0.3 % (ref 0–1.5)
BUN SERPL-MCNC: 22 MG/DL (ref 6–20)
BUN/CREAT SERPL: 8 (ref 7–25)
CALCIUM SPEC-SCNC: 8.2 MG/DL (ref 8.6–10.5)
CHLORIDE SERPL-SCNC: 104 MMOL/L (ref 98–107)
CO2 SERPL-SCNC: 22.1 MMOL/L (ref 22–29)
CREAT SERPL-MCNC: 2.75 MG/DL (ref 0.57–1)
DEPRECATED RDW RBC AUTO: 50.6 FL (ref 37–54)
EOSINOPHIL # BLD AUTO: 0.03 10*3/MM3 (ref 0–0.4)
EOSINOPHIL NFR BLD AUTO: 0.8 % (ref 0.3–6.2)
ERYTHROCYTE [DISTWIDTH] IN BLOOD BY AUTOMATED COUNT: 16.1 % (ref 12.3–15.4)
GFR SERPL CREATININE-BSD FRML MDRD: 18 ML/MIN/1.73
GLUCOSE BLDC GLUCOMTR-MCNC: 133 MG/DL (ref 70–130)
GLUCOSE BLDC GLUCOMTR-MCNC: 306 MG/DL (ref 70–130)
GLUCOSE SERPL-MCNC: 241 MG/DL (ref 65–99)
HCT VFR BLD AUTO: 30.4 % (ref 34–46.6)
HGB BLD-MCNC: 9.4 G/DL (ref 12–15.9)
IMM GRANULOCYTES # BLD AUTO: 0.02 10*3/MM3 (ref 0–0.05)
IMM GRANULOCYTES NFR BLD AUTO: 0.6 % (ref 0–0.5)
LYMPHOCYTES # BLD AUTO: 0.45 10*3/MM3 (ref 0.7–3.1)
LYMPHOCYTES NFR BLD AUTO: 12.4 % (ref 19.6–45.3)
MCH RBC QN AUTO: 26.3 PG (ref 26.6–33)
MCHC RBC AUTO-ENTMCNC: 30.9 G/DL (ref 31.5–35.7)
MCV RBC AUTO: 85.2 FL (ref 79–97)
MONOCYTES # BLD AUTO: 0.27 10*3/MM3 (ref 0.1–0.9)
MONOCYTES NFR BLD AUTO: 7.4 % (ref 5–12)
NEUTROPHILS NFR BLD AUTO: 2.85 10*3/MM3 (ref 1.7–7)
NEUTROPHILS NFR BLD AUTO: 78.5 % (ref 42.7–76)
NRBC BLD AUTO-RTO: 0 /100 WBC (ref 0–0.2)
PLATELET # BLD AUTO: 168 10*3/MM3 (ref 140–450)
PMV BLD AUTO: 8.7 FL (ref 6–12)
POTASSIUM SERPL-SCNC: 4.1 MMOL/L (ref 3.5–5.2)
RBC # BLD AUTO: 3.57 10*6/MM3 (ref 3.77–5.28)
SODIUM SERPL-SCNC: 135 MMOL/L (ref 136–145)
VANCOMYCIN SERPL-MCNC: 13 MCG/ML (ref 5–40)
WBC # BLD AUTO: 3.63 10*3/MM3 (ref 3.4–10.8)

## 2020-11-22 PROCEDURE — 80202 ASSAY OF VANCOMYCIN: CPT | Performed by: INTERNAL MEDICINE

## 2020-11-22 PROCEDURE — 74176 CT ABD & PELVIS W/O CONTRAST: CPT

## 2020-11-22 PROCEDURE — 85025 COMPLETE CBC W/AUTO DIFF WBC: CPT | Performed by: HOSPITALIST

## 2020-11-22 PROCEDURE — 80048 BASIC METABOLIC PNL TOTAL CA: CPT | Performed by: HOSPITALIST

## 2020-11-22 PROCEDURE — 82962 GLUCOSE BLOOD TEST: CPT

## 2020-11-22 PROCEDURE — 63710000001 INSULIN LISPRO (HUMAN) PER 5 UNITS: Performed by: HOSPITALIST

## 2020-11-22 RX ORDER — LEVOFLOXACIN 500 MG/1
500 TABLET, FILM COATED ORAL EVERY OTHER DAY
Qty: 2 TABLET | Refills: 0 | Status: SHIPPED | OUTPATIENT
Start: 2020-11-23 | End: 2020-11-26

## 2020-11-22 RX ORDER — ATENOLOL 50 MG/1
50 TABLET ORAL
Qty: 30 TABLET | Refills: 0 | Status: SHIPPED | OUTPATIENT
Start: 2020-11-22 | End: 2020-12-22

## 2020-11-22 RX ADMIN — BUPROPION HYDROCHLORIDE 300 MG: 300 TABLET, EXTENDED RELEASE ORAL at 09:41

## 2020-11-22 RX ADMIN — FAMOTIDINE 20 MG: 20 TABLET, FILM COATED ORAL at 09:41

## 2020-11-22 RX ADMIN — SODIUM BICARBONATE 1300 MG: 650 TABLET ORAL at 09:41

## 2020-11-22 RX ADMIN — INSULIN LISPRO 5 UNITS: 100 INJECTION, SOLUTION INTRAVENOUS; SUBCUTANEOUS at 08:30

## 2020-11-22 NOTE — DISCHARGE SUMMARY
Discharge summary    Date of admission 11/19/2020  Date of discharge 11/22/2020    Final diagnosis  Acute kidney injury resolved  Chronic kidney disease stage IV  Status post kidney transplant in 1995  Sepsis   Diabetes mellitus with low blood sugar  Hypertension  Hyperlipidemia  Chronic anemia   Gastroesophageal reflux disease    Discharge medications    Current Facility-Administered Medications:   •  acetaminophen (TYLENOL) tablet 650 mg, 650 mg, Oral, Q4H PRN, Lázaro Ash MD, 650 mg at 11/21/20 2245  •  allopurinol (ZYLOPRIM) tablet 100 mg, 100 mg, Oral, Nightly, Lázaro Ash MD, 100 mg at 11/21/20 2038  •  atenolol (TENORMIN) tablet 50 mg, 50 mg, Oral, Q24H, Lázaro Ash MD, 50 mg at 11/21/20 2243  •  buPROPion XL (WELLBUTRIN XL) 24 hr tablet 300 mg, 300 mg, Oral, Daily, Lázaro Ash MD, 300 mg at 11/22/20 0941  •  famotidine (PEPCID) tablet 20 mg, 20 mg, Oral, BID, Lázaro Ash MD, 20 mg at 11/22/20 0941  •  hydrOXYzine (ATARAX) tablet 25 mg, 25 mg, Oral, Nightly, Lázaro Ash MD, 25 mg at 11/21/20 2037  •  insulin lispro (humaLOG) injection 0-7 Units, 0-7 Units, Subcutaneous, TID AC, Lázaro Ash MD, 5 Units at 11/22/20 0830  •  levoFLOXacin (LEVAQUIN) tablet 500 mg, 500 mg, Oral, Every Other Day, Lee Welsh MD, 500 mg at 11/21/20 2037  •  ondansetron (ZOFRAN) injection 4 mg, 4 mg, Intravenous, Q6H PRN, Lázaro Ash MD, 4 mg at 11/19/20 1842  •  ondansetron ODT (ZOFRAN-ODT) disintegrating tablet 4 mg, 4 mg, Oral, Q6H PRN, Lázaro Ash MD  •  paricalcitol (ZEMPLAR) capsule 2 mcg, 2 mcg, Oral, Nightly, Lázaro Ash MD, 2 mcg at 11/21/20 2037  •  predniSONE (DELTASONE) tablet 5 mg, 5 mg, Oral, Nightly, Lázaro Ash MD, 5 mg at 11/21/20 2037  •  sirolimus (RAPAMUNE) tablet 2 mg, 2 mg, Oral, Daily, Lázaro Ash MD, 2 mg at 11/21/20 2300  •  sodium bicarbonate tablet 1,300 mg, 1,300 mg, Oral, TID, Lázaro Ash MD, 1,300 mg at 11/22/20 0941  •  [COMPLETED] Insert peripheral IV, , , Once **AND**  sodium chloride 0.9 % flush 10 mL, 10 mL, Intravenous, PRN, Rachelle Han, APRN, 10 mL at 11/21/20 0855     Consults obtained  Nephrology  Urology  Infectious disease  Nutrition    Procedures  None    Hospital course  50-year white female with history of kidney transplant in 1995 other medical problem diabetes hypertension hyperlipidemia and chronic kidney disease stage IV admitted through emergency room with fever chills abdominal pain nausea vomiting diarrhea started few days prior to admission.  Patient work-up in ER revealed developing sepsis and worsening anemia admit for management.  Patient admitted received fluids and empiric antibiotics followed by nephrology and infectious disease.  Patient cultures remains negative but she treated with broad-spectrum cefepime and vancomycin.  Patient has difficulty with IV access and infections recommend to change to oral Levaquin and can be discharged.  Patient has been afebrile and blood pressure stabilized and kidney function returned to baseline with BUN of 22 creatinine 2.75 and hemoglobin of 9.4 after transfusion 2 units.  Patient has perinephric fluid collection urology consult obtained which is pending at this time but she wants to go home and clear from nephrology and infectious disease to be discharged on oral antibiotics if no need for surgical drainage.  Patient also has a repeated CT which is also pending.  Patient is clinically stable and wants to go home.    Discharge diet regular    Activity as tolerated    Medication as above    Follow-up with primary doctor in 1 week and follow-up with nephrology urology and infectious disease per their instruction and take medication as directed    KALYANI DINERO MD

## 2020-11-22 NOTE — PLAN OF CARE
Goal Outcome Evaluation:  Plan of Care Reviewed With: patient  Progress: no change  Outcome Summary: Patient is a 50 year old patient admitted for sepsis with acute renal failure, septic shock, and uti.  Patient denies any acute distress or discomfort.  Patient in and out caths self at home, liu catheter placed upon admission.  Draining clear yellow urine.  Patient currently scheduled to receive IV abx per ID.  Unable to receive at this time related to loss of IV access.  Patient denies any acute distress or discomfort.  Sitting in chair at bedside visiting with family.  Will continue to monitor.    11/22- Patient has met criteria for discharge and will be discharged home with outpatient follow up.  Discharge will be reviewed at length with patient with opportunity for patient to ask questions.

## 2020-11-22 NOTE — NURSING NOTE
Urologist called to confirm okay for patient to go home without voiding as she will  intermittently cath at home as needed.  Urologist confirmed that this was okay.

## 2020-11-22 NOTE — PLAN OF CARE
Goal Outcome Evaluation:  Plan of Care Reviewed With: patient  Progress: no change  Outcome Summary: VSS, , NSR, call to Dr. Damon re pt c/o pain with F/C - abdominal CT ordered & Urology consult called, Dr. Damon will determine whether to d/c liu after reviewing CT results, Tylenol x1 - last check pt states no pain

## 2020-11-22 NOTE — PROGRESS NOTES
NEPHROLOGY PROGRESS NOTE    PATIENT IDENTIFICATION:   Name:  Kinga Taylor      MRN:  [id number]     50 y.o.  female             Reason for visit: YOEL    SUBJECTIVE:     Seen and examined this morning.  Patient feeling better  Denies any chest pain, shortness of breath, nausea or vomiting  Pain over graft improving  OBJECTIVE:  Vitals:    11/21/20 1426 11/21/20 2045 11/22/20 0051 11/22/20 0748   BP: 140/83 134/90 129/79 135/80   BP Location: Right arm Right arm Right arm Right arm   Patient Position: Lying Sitting Lying Lying   Pulse: 64 80  68   Resp: 18 18 16 18   Temp: 98.7 °F (37.1 °C)  97.8 °F (36.6 °C) 97.9 °F (36.6 °C)   TempSrc: Oral  Oral Oral   SpO2:  95% 98%    Weight:       Height:               Body mass index is 23.57 kg/m².    Intake/Output Summary (Last 24 hours) at 11/22/2020 0844  Last data filed at 11/22/2020 0546  Gross per 24 hour   Intake 460 ml   Output 2300 ml   Net -1840 ml         Exam:  GEN:  No distress, appears stated age  EYES:   Anicteric sclera  ENT:    External ears/nose normal, MM are moist  NECK:  No adenopathy, JVP none  LUNGS: Normal chest on inspection; not labored  CV:  Normal S1S2, without murmur  ABD:  Non-tender, non-distended, no hepatosplenomegaly, +BS  EXT:  No edema; no cyanosis; clubbing    Scheduled meds:  allopurinol, 100 mg, Oral, Nightly  atenolol, 50 mg, Oral, Q24H  buPROPion XL, 300 mg, Oral, Daily  cefepime, 2 g, Intravenous, Q24H  famotidine, 20 mg, Oral, BID  hydrOXYzine, 25 mg, Oral, Nightly  insulin lispro, 0-7 Units, Subcutaneous, TID AC  levoFLOXacin, 500 mg, Oral, Every Other Day  paricalcitol, 2 mcg, Oral, Nightly  predniSONE, 5 mg, Oral, Nightly  sirolimus, 2 mg, Oral, Daily  sodium bicarbonate, 1,300 mg, Oral, TID  vancomycin, 500 mg, Intravenous, Once  Vancomycin Pharmacy Intermittent Dosing, , Does not apply, Daily      IV meds:                      Pharmacy to dose vancomycin,         Data Review:    Results from last 7 days   Lab Units  11/22/20  0509 11/21/20  1002 11/20/20  0653 11/19/20  1052   SODIUM mmol/L 135* 137 133* 132*   POTASSIUM mmol/L 4.1 3.5 4.2 3.4*   CHLORIDE mmol/L 104 105 104 94*   CO2 mmol/L 22.1 21.9* 16.7* 24.6   BUN mg/dL 22* 23* 29* 34*   CREATININE mg/dL 2.75* 2.71* 3.27* 4.20*   CALCIUM mg/dL 8.2* 7.9* 7.7* 9.6   BILIRUBIN mg/dL  --   --  0.2 0.4   ALK PHOS U/L  --   --  83 112   ALT (SGPT) U/L  --   --  9 11   AST (SGOT) U/L  --   --  18 28   GLUCOSE mg/dL 241* 119* 357* 38*       Estimated Creatinine Clearance: 23 mL/min (A) (by C-G formula based on SCr of 2.75 mg/dL (H)).          Results from last 7 days   Lab Units 11/22/20  0510 11/21/20  1002 11/20/20  0945 11/19/20  1052   WBC 10*3/mm3 3.63 4.37 3.49 7.36   HEMOGLOBIN g/dL 9.4* 9.1* 6.7* 8.9*   PLATELETS 10*3/mm3 168 165 182 330                   ASSESSMENT:     Sepsis with acute renal failure without septic shock (CMS/[facility])        -Acute kidney injury on chronic kidney disease stage IV: Likely due to sepsis and UTI.    Creatinine improved almost to baseline.  Electrolytes, volume status okay.  Transplant kidney hydronephrosis noticed on CT scan.  Wynn catheter in place     -History of kidney transplantation in January 1995 with baseline creatinine 2.4 mg/dL  -Urinary tract infection:  On oral Levaquin now  -Immunosuppressive medication     Plan:  Creatinine now improved to around 2.7 mg/DL and stable around that.  Electrolytes, volume status okay.  Less likely to be rejection  Continue Rapamune and prednisone      Hong Restrepo MD  11/22/2020    08:44 EST

## 2020-11-22 NOTE — CONSULTS
FIRST UROLOGY CONSULT      Patient Identification:  NAME:  Kinga Taylor  Age:  50 y.o.   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  She is done very well with her transplant but she is had declining function.  She recently had a transplant renal biopsy.  We are asked see the patient due to her hydronephrosis of the transplant.  She was admitted with urinary sepsis.  She has a neurogenic bladder and she catheterized herself only twice a day and voids in between times.  She does not void on a cycle basis.  She states that she catheterize self more than twice a day she tends to have a bit of burning but she does have putting catheters at home.  CT imaging shows a very distended bladder with hydronephrosis of the transplant collecting system.  Catheter was placed shortly thereafter.  I do not know with the documented volume of urine was in the bladder.  She feels very comfortable self-catheterization only complaints were just that burning at times.      Past medical history:  Past Medical History:   Diagnosis Date   • Acid reflux disease    • Bruises easily    • Depression    • Diabetes mellitus (CMS/HCC)    • Disease of thyroid gland     PARATHYROID   • End stage renal disease (CMS/HCC)    • Fistula, arteriovenous, acquired (CMS/HCC)     left arm   • Hiatal hernia    • Hip pain, left    • History of anesthesia complications     nightmares and has trouble waking up   • History of transfusion     UNSURE OF LAST.  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   • HIATAL HERNIA REPAIR      age 15   • HIP SURGERY      replacements bilt with core depressions    • JOINT REPLACEMENT Bilateral     HIP   • NEPHRECTOMY Right 09/1994   • NEPHROLITHOTOMY Right 01/05/1995    TRANSPLANT   • REIMPLANT URETER IN BLADDER     • STOMACH SURGERY      area near transplant due to necrosis area removed   • TONSILLECTOMY     • TOTAL HIP ARTHROPLASTY REVISION Right    • TOTAL HIP ARTHROPLASTY REVISION Left 6/28/2017    Procedure: LT TOTAL HIP ARTHROPLASTY REVISION;  Surgeon: Wil Curtis MD;  Location: [facility] OR;  Service:        Allergies:  Aldomet [methyldopa], Dilaudid [hydromorphone hcl], and Morphine and related    Home medications:  Medications Prior to Admission   Medication Sig Dispense Refill Last Dose   • alendronate (FOSAMAX) 70 MG tablet Take 70 mg by mouth Every 7 (Seven) Days. Patient takes on Wednesdays 11/18/2020 at Unknown time   • allopurinol (ZYLOPRIM) 300 MG tablet Take 300 mg by mouth Every Night.   11/18/2020 at Unknown time   • atenolol (TENORMIN) 100 MG tablet Take 100 mg by mouth Every Night.   11/18/2020 at Unknown time   • atorvastatin (LIPITOR) 40 MG tablet Take 40 mg by mouth Every Night.   11/18/2020 at Unknown time   • buPROPion XL (WELLBUTRIN XL) 300 MG 24 hr tablet Take 300 mg by mouth Daily.   11/18/2020 at Unknown time   • glyburide (DIAbeta) 2.5 MG tablet Take 2.5 mg by mouth Every Morning.   11/18/2020 at Unknown time   • hydrOXYzine (ATARAX) 25 MG tablet Take 25 mg by mouth Every Night.   11/18/2020 at Unknown time   • omeprazole (priLOSEC) 40 MG capsule Take 40 mg by mouth Every Morning.   11/18/2020 at Unknown time   • paricalcitol (ZEMPLAR) 2 MCG capsule Take 2 mg by  mouth Every Night.   2020 at Unknown time   • predniSONE (DELTASONE) 5 MG tablet Take 5 mg by mouth Every Night.   2020 at Unknown time   • sirolimus (Rapamune) 2 MG tablet Take 2 mg by mouth Daily.   2020 at Unknown time   • sodium bicarbonate 650 MG tablet Take 1,300 mg by mouth 2 (Two) Times a Day.   2020 at Unknown time       Hospital medications:  allopurinol, 100 mg, Oral, Nightly  atenolol, 50 mg, Oral, Q24H  buPROPion XL, 300 mg, Oral, Daily  famotidine, 20 mg, Oral, BID  hydrOXYzine, 25 mg, Oral, Nightly  insulin lispro, 0-7 Units, Subcutaneous, TID AC  levoFLOXacin, 500 mg, Oral, Every Other Day  paricalcitol, 2 mcg, Oral, Nightly  predniSONE, 5 mg, Oral, Nightly  sirolimus, 2 mg, Oral, Daily  sodium bicarbonate, 1,300 mg, Oral, TID         •  acetaminophen  •  ondansetron  •  ondansetron ODT  •  [COMPLETED] Insert peripheral IV **AND** sodium chloride    Family history:  Family History   Problem Relation Age of Onset   • Cancer Mother    • Stroke Maternal Grandmother    • Malig Hyperthermia Neg Hx        Social history:  Social History     Tobacco Use   • Smoking status: Never Smoker   • Smokeless tobacco: Never Used   Substance Use Topics   • Alcohol use: No   • Drug use: No       Review of systems:    Negative 12-system ROS except for the following: Currently no cardiac or pulmonary complaints.  No GI or hepatobiliary issues.  Musculoskeletal complaints.  She feels a fistula in her left upper extremity was working well.      Objective:  TMax 24 hours:   Temp (24hrs), Av.9 °F (36.6 °C), Min:97.8 °F (36.6 °C), Max:97.9 °F (36.6 °C)      Vitals Ranges:   Temp:  [97.8 °F (36.6 °C)-97.9 °F (36.6 °C)] 97.9 °F (36.6 °C)  Heart Rate:  [68-80] 68  Resp:  [16-18] 18  BP: (129-135)/(79-90) 135/80    Intake/Output Last 3 shifts:  I/O last 3 completed shifts:  In: 1157.9 [P.O.:460; Blood:697.9]  Out: 3600 [Urine:3600]     Physical Exam:       General Appearance:    Alert, cooperative, in  no acute distress   Head:    Normocephalic, without obvious abnormality, atraumatic   Eyes:          PERRL, conjunctivae and corneas clear   Ears:    Normal external inspection   Throat:   No oral lesions, oral mucosa moist   Neck:   Supple, no LAD, trachea midline   Back:     No CVA tenderness   Lungs:     Respirations unlabored, symmetric excursion    Heart:    RRR, intact peripheral pulses   Abdomen:    Postsurgical benign no peritonitis.   :   Deferred   Extremities:   No edema, no deformity   Skin:   No bleeding, bruising or rashes   Neuro/Psych:   Orientation intact, mood/affect pleasant, no focal findings       Results review:   I reviewed the patient's new clinical results.    Data review:  Lab Results (last 24 hours)     Procedure Component Value Units Date/Time    Blood Culture - Blood, Arm, Right [[id number]] Collected: 11/19/20 1347    Specimen: Blood from Arm, Right Updated: 11/22/20 1415     Blood Culture No growth at 3 days    Blood Culture - Blood, Hand, Right [[id number]] Collected: 11/19/20 1347    Specimen: Blood from Hand, Right Updated: 11/22/20 1415     Blood Culture No growth at 3 days    POC Glucose Once [[id number]]  (Abnormal) Collected: 11/22/20 1149    Specimen: Blood Updated: 11/22/20 1153     Glucose 133 mg/dL     Vancomycin, Random [[id number]]  (Normal) Collected: 11/22/20 0509    Specimen: Blood Updated: 11/22/20 0628     Vancomycin Random 13.00 mcg/mL     Basic Metabolic Panel [[id number]]  (Abnormal) Collected: 11/22/20 0509    Specimen: Blood Updated: 11/22/20 0627     Glucose 241 mg/dL      BUN 22 mg/dL      Creatinine 2.75 mg/dL      Sodium 135 mmol/L      Potassium 4.1 mmol/L      Chloride 104 mmol/L      CO2 22.1 mmol/L      Calcium 8.2 mg/dL      eGFR Non African Amer 18 mL/min/1.73      BUN/Creatinine Ratio 8.0     Anion Gap 8.9 mmol/L     Narrative:      GFR Normal >60  Chronic Kidney Disease <60  Kidney Failure <15      POC Glucose Once [[id number]]  (Abnormal) Collected:  11/22/20 0607    Specimen: Blood Updated: 11/22/20 0608     Glucose 306 mg/dL     CBC & Differential [[id number]]  (Abnormal) Collected: 11/22/20 0510    Specimen: Blood Updated: 11/22/20 0604    Narrative:      The following orders were created for panel order CBC & Differential.  Procedure                               Abnormality         Status                     ---------                               -----------         ------                     CBC Auto Differential[[id number]]        Abnormal            Final result                 Please view results for these tests on the individual orders.    CBC Auto Differential [[id number]]  (Abnormal) Collected: 11/22/20 0510    Specimen: Blood Updated: 11/22/20 0604     WBC 3.63 10*3/mm3      RBC 3.57 10*6/mm3      Hemoglobin 9.4 g/dL      Hematocrit 30.4 %      MCV 85.2 fL      MCH 26.3 pg      MCHC 30.9 g/dL      RDW 16.1 %      RDW-SD 50.6 fl      MPV 8.7 fL      Platelets 168 10*3/mm3      Neutrophil % 78.5 %      Lymphocyte % 12.4 %      Monocyte % 7.4 %      Eosinophil % 0.8 %      Basophil % 0.3 %      Immature Grans % 0.6 %      Neutrophils, Absolute 2.85 10*3/mm3      Lymphocytes, Absolute 0.45 10*3/mm3      Monocytes, Absolute 0.27 10*3/mm3      Eosinophils, Absolute 0.03 10*3/mm3      Basophils, Absolute 0.01 10*3/mm3      Immature Grans, Absolute 0.02 10*3/mm3      nRBC 0.0 /100 WBC     POC Glucose Once [[id number]]  (Abnormal) Collected: 11/21/20 2049    Specimen: Blood Updated: 11/21/20 2051     Glucose 171 mg/dL     Blood Culture - Blood, Arm, Right [[id number]] Collected: 11/19/20 1741    Specimen: Blood from Arm, Right Updated: 11/21/20 1800     Blood Culture No growth at 2 days    POC Glucose Once [[id number]]  (Normal) Collected: 11/21/20 1645    Specimen: Blood Updated: 11/21/20 1651     Glucose 99 mg/dL            Imaging:  Imaging Results (Last 24 Hours)     ** No results found for the last 24 hours. **             Assessment:       Sepsis with acute  renal failure without septic shock (CMS/HCC)    Transplant hydronephrosis secondary to bladder distention and retention.      Plan:     Patient may be discharged today after removal of her catheter.  We will see what her repeat CAT scan's shows as per the infectious disease service.  I suspect her perinephric fluid should be resolving.  I suspect her hydronephrosis should be getting better as well.  She will resume intermittent catheterization at home but I told her she needs to do this 4 times a day.  She will need a follow-up ultrasound then in a few months in our office.    Teto Draper MD  11/22/20  14:28 EST

## 2020-11-22 NOTE — PROGRESS NOTES
"[facility] Clinical Pharmacy Services: Vancomycin Pharmacokinetic Note (Follow-Up Note)    Patient Name: Kinga Taylor  Indication: Sepsis  Target AUC24 level: 400-600  Consulting Provider: Dr. Ash (Dr. Damon is following for ID)  Allergies:   Allergies as of 11/19/2020 - Reviewed 11/19/2020   Allergen Reaction Noted   • Aldomet [methyldopa] Swelling 01/30/2017   • Dilaudid [hydromorphone hcl] Itching 01/30/2017   • Morphine and related Itching 01/30/2017     Duration of Therapy: 5 days    Other Antimicrobials: Cefepime 2g IV q24h    Current Vancomycin Dose: Intermittent dosing     Relevant clinical data and objective history reviewed:  50 y.o. female 160 cm (62.99\") 60.3 kg (133 lb)    Vitals:    11/22/20 0748   BP: 135/80   Pulse: 68   Resp: 18   Temp: 97.9 °F (36.6 °C)   SpO2:      Creatinine   Date Value Ref Range Status   11/22/2020 2.75 (H) 0.57 - 1.00 mg/dL Final   11/21/2020 2.71 (H) 0.57 - 1.00 mg/dL Final   11/20/2020 3.27 (H) 0.57 - 1.00 mg/dL Final     BUN   Date Value Ref Range Status   11/22/2020 22 (H) 6 - 20 mg/dL Final     Estimated Creatinine Clearance: 23 mL/min (A) (by C-G formula based on SCr of 2.75 mg/dL (H)).    Lab Results   Component Value Date    WBC 3.63 11/22/2020     Baseline culture/source/susceptibility:  11/19: BCx-NGTD  11/19: UCx-25K mixed gosia  11/19: Bcx-NGTD    Vancomycin Dosing History:   11/19: Vancomycin 1250 mg (20 mg/kg) loading dose IV once @ 1843   11/20 @ 0653: Random vancomycin level=21.7 (~12 hr level)  11/20: Vancomycin 500 mg IV x 1 @ 1530   11/21 @ 1002: Random vancomycin level = 17.9    11/22 @  0509: random level = 13 mcg/mL      Assessment:     Recommendations/Plan:  Pt lost IV access prior to yesterday's dose.  Abx converted to PO levaquin.  No further need for therapeutic drug monitoring.  Pharmacy will sign off.  Please re-consult if needed.  Thank you for the consultation.     Elise Melchor, PharmD, MPH, BCPS      "

## 2020-11-22 NOTE — NURSING NOTE
Nurse enntered room at this time to hang IV abx.  IV assessed, and noted to be painful.  Patient with poor access, left arm fistula; no bp's or sticks.  IV therapy paged, awaiting IV placement.      At approximately 1530, informed by IV therapy of inability to obtain IV access.  Infectious disease paged to determine length of antibiotics.  Per ID on-call patient will need IV abx for 2 weeks and preferred, tunneled central line placement for IV abx.  General surgery consulted for placement.      Spoke with Dr. Patel, he is to review chart and follow up with nurse after review.  After speaking with Dr. Nanci Chino, it was determined that line would not be placed and antibiotics would be switched to oral.    1700- Dr. Damon paged to ensure he was okay with patient's antibiotics being switched to oral.  Dr. Damon to call Dr. Nanci Chino and discuss.    Will continue to monitor chart for further orders.

## 2020-11-22 NOTE — PROGRESS NOTES
"Daily progress note    Chief complaint  Doing better   No new complaints  Wants to go home    History of present illness  50-year-old white female with history of status post kidney transplant diabetes mellitus hypertension hyperlipidemia and chronic kidney disease stage IV presented to [facility] emergency room with fever chills abdominal pain nausea vomiting diarrhea started Monday.  Patient has no appetite for several months.  Patient has lost 40 pounds in 3 months.  Patient denies any chest pain increased shortness of breath.  Patient work-up in ER revealed sepsis with acute kidney injury and also found to be hypocalcemic.  Patient admitted for management.  At the time of interview she is in no distress and wants to eat.      REVIEW OF SYSTEMS  Unremarkable     PHYSICAL EXAM  Blood pressure 135/80, pulse 68, temperature 97.9 °F (36.6 °C), temperature source Oral, resp. rate 18, height 160 cm (62.99\"), weight 60.3 kg (133 lb), last menstrual period 11/19/2020, SpO2 98 %.    GENERAL: Appears older than stated age, non-toxic appearing, not distressed  HENT: normocephalic, atraumatic  EYES: no scleral icterus, PERRL, pale sclera  CV: regular rhythm, regular rate, no murmur  RESPIRATORY: normal effort, CTAB  ABDOMEN: soft, normal bowel sounds, nontender  MUSCULOSKELETAL: no deformity  NEURO: alert, moves all extremities, follows commands, mental status normal/baseline  SKIN: warm, dry, no rash   Psych: Appropriate mood and affect    LAB RESULTS  Lab Results (last 24 hours)     Procedure Component Value Units Date/Time    POC Glucose Once [[id number]]  (Abnormal) Collected: 11/22/20 1149    Specimen: Blood Updated: 11/22/20 1153     Glucose 133 mg/dL     Vancomycin, Random [[id number]]  (Normal) Collected: 11/22/20 0509    Specimen: Blood Updated: 11/22/20 0628     Vancomycin Random 13.00 mcg/mL     Basic Metabolic Panel [[id number]]  (Abnormal) Collected: 11/22/20 0509    Specimen: Blood Updated: 11/22/20 " 0627     Glucose 241 mg/dL      BUN 22 mg/dL      Creatinine 2.75 mg/dL      Sodium 135 mmol/L      Potassium 4.1 mmol/L      Chloride 104 mmol/L      CO2 22.1 mmol/L      Calcium 8.2 mg/dL      eGFR Non African Amer 18 mL/min/1.73      BUN/Creatinine Ratio 8.0     Anion Gap 8.9 mmol/L     Narrative:      GFR Normal >60  Chronic Kidney Disease <60  Kidney Failure <15      POC Glucose Once [[id number]]  (Abnormal) Collected: 11/22/20 0607    Specimen: Blood Updated: 11/22/20 0608     Glucose 306 mg/dL     CBC & Differential [[id number]]  (Abnormal) Collected: 11/22/20 0510    Specimen: Blood Updated: 11/22/20 0604    Narrative:      The following orders were created for panel order CBC & Differential.  Procedure                               Abnormality         Status                     ---------                               -----------         ------                     CBC Auto Differential[[id number]]        Abnormal            Final result                 Please view results for these tests on the individual orders.    CBC Auto Differential [[id number]]  (Abnormal) Collected: 11/22/20 0510    Specimen: Blood Updated: 11/22/20 0604     WBC 3.63 10*3/mm3      RBC 3.57 10*6/mm3      Hemoglobin 9.4 g/dL      Hematocrit 30.4 %      MCV 85.2 fL      MCH 26.3 pg      MCHC 30.9 g/dL      RDW 16.1 %      RDW-SD 50.6 fl      MPV 8.7 fL      Platelets 168 10*3/mm3      Neutrophil % 78.5 %      Lymphocyte % 12.4 %      Monocyte % 7.4 %      Eosinophil % 0.8 %      Basophil % 0.3 %      Immature Grans % 0.6 %      Neutrophils, Absolute 2.85 10*3/mm3      Lymphocytes, Absolute 0.45 10*3/mm3      Monocytes, Absolute 0.27 10*3/mm3      Eosinophils, Absolute 0.03 10*3/mm3      Basophils, Absolute 0.01 10*3/mm3      Immature Grans, Absolute 0.02 10*3/mm3      nRBC 0.0 /100 WBC     POC Glucose Once [[id number]]  (Abnormal) Collected: 11/21/20 2049    Specimen: Blood Updated: 11/21/20 2051     Glucose 171 mg/dL     Blood Culture -  Blood, Arm, Right [[id number]] Collected: 11/19/20 1741    Specimen: Blood from Arm, Right Updated: 11/21/20 1800     Blood Culture No growth at 2 days    POC Glucose Once [[id number]]  (Normal) Collected: 11/21/20 1645    Specimen: Blood Updated: 11/21/20 1651     Glucose 99 mg/dL     Blood Culture - Blood, Arm, Right [[id number]] Collected: 11/19/20 1347    Specimen: Blood from Arm, Right Updated: 11/21/20 1415     Blood Culture No growth at 2 days    Blood Culture - Blood, Hand, Right [[id number]] Collected: 11/19/20 1347    Specimen: Blood from Hand, Right Updated: 11/21/20 1415     Blood Culture No growth at 2 days        Imaging Results (Last 24 Hours)     ** No results found for the last 24 hours. **          Current Facility-Administered Medications:   •  acetaminophen (TYLENOL) tablet 650 mg, 650 mg, Oral, Q4H PRN, Lázaro Ash MD, 650 mg at 11/21/20 2245  •  allopurinol (ZYLOPRIM) tablet 100 mg, 100 mg, Oral, Nightly, Lázaro Ash MD, 100 mg at 11/21/20 2038  •  atenolol (TENORMIN) tablet 50 mg, 50 mg, Oral, Q24H, Lázaro Ash MD, 50 mg at 11/21/20 2243  •  buPROPion XL (WELLBUTRIN XL) 24 hr tablet 300 mg, 300 mg, Oral, Daily, Lázaro Ash MD, 300 mg at 11/22/20 0941  •  famotidine (PEPCID) tablet 20 mg, 20 mg, Oral, BID, Lázaro Ash MD, 20 mg at 11/22/20 0941  •  hydrOXYzine (ATARAX) tablet 25 mg, 25 mg, Oral, Nightly, Lázaro Ash MD, 25 mg at 11/21/20 2037  •  insulin lispro (humaLOG) injection 0-7 Units, 0-7 Units, Subcutaneous, TID AC, Lázaro Ash MD, 5 Units at 11/22/20 0830  •  levoFLOXacin (LEVAQUIN) tablet 500 mg, 500 mg, Oral, Every Other Day, Al-Solaiman, Yaser, MD, 500 mg at 11/21/20 2037  •  ondansetron (ZOFRAN) injection 4 mg, 4 mg, Intravenous, Q6H PRN, Lázaro Ash MD, 4 mg at 11/19/20 1842  •  ondansetron ODT (ZOFRAN-ODT) disintegrating tablet 4 mg, 4 mg, Oral, Q6H PRN, Lázaro Ash MD  •  paricalcitol (ZEMPLAR) capsule 2 mcg, 2 mcg, Oral, Nightly, Lázaro Ash MD, 2 mcg at  11/21/20 2037  •  predniSONE (DELTASONE) tablet 5 mg, 5 mg, Oral, Nightly, Kalyani Ash MD, 5 mg at 11/21/20 2037  •  sirolimus (RAPAMUNE) tablet 2 mg, 2 mg, Oral, Daily, Kalyani Ash MD, 2 mg at 11/21/20 2300  •  sodium bicarbonate tablet 1,300 mg, 1,300 mg, Oral, TID, Kalyani Ash MD, 1,300 mg at 11/22/20 0941  •  [COMPLETED] Insert peripheral IV, , , Once **AND** sodium chloride 0.9 % flush 10 mL, 10 mL, Intravenous, PRN, Rachelle Han, APRN, 10 mL at 11/21/20 0855     ASSESSMENT  Acute kidney injury  Chronic kidney disease stage IV  Status post kidney transplant in 1995  Sepsis source not clear  Diabetes mellitus with low blood sugar  Hypertension  Hyperlipidemia  Chronic anemia with drop in H&H  Gastroesophageal reflux disease    PLAN  Discharge home on oral antibiotics after seen by urology and okay with all  Discharge summary dictated    KALYANI ASH MD

## 2020-11-23 ENCOUNTER — READMISSION MANAGEMENT (OUTPATIENT)
Dept: CALL CENTER | Facility: HOSPITAL | Age: 50
End: 2020-11-23

## 2020-11-23 NOTE — OUTREACH NOTE
Prep Survey      Responses   [facility] facility patient discharged from?  [place]   Is LACE score < 7 ?  No   Eligibility  Readm Mgmt   Discharge diagnosis  YOEL,  Chronic kidney disease stage IV,  Sepsis,  Hx of kidney transplant   Does the patient have one of the following disease processes/diagnoses(primary or secondary)?  Sepsis   Does the patient have Home health ordered?  No   Is there a DME ordered?  No   Prep survey completed?  Yes          Celia Hewitt RN

## 2020-11-23 NOTE — PROGRESS NOTES
Case Management Discharge Note      Final Note: DC'd home 11/22    Provided Post Acute Provider List?: N/A  Provided Post Acute Provider Quality & Resource List?: N/A         Transportation Services  Private: Car    Final Discharge Disposition Code: 01 - home or self-care

## 2020-11-24 LAB
BACTERIA SPEC AEROBE CULT: NORMAL

## 2020-11-25 NOTE — PAYOR COMM NOTE
"Titus Barrett (50 y.o. Female)               ATTENTION;    DC SUMMARY CASE REF # [id number]        Date of Birth Social Security Number Address Home Phone MRN    [id number]  [id number] [facility] [address] [phone] [id number]    [employer] Marital Status          Zoroastrianism        Admission Date Admission Type Admitting Provider Attending Provider Department, Room/Bed    11/19/20 Emergency Lázaro Ash MD  [address], [address]    Discharge Date Discharge Disposition Discharge Destination        11/22/2020 Home or Self Care              Attending Provider: (none)   Allergies: Aldomet [Methyldopa], Dilaudid [Hydromorphone Hcl], Morphine And Related    Isolation: None   Infection: None   Code Status: Prior    Ht: 160 cm (62.99\")   Wt: 60.3 kg (133 lb)    Admission Cmt: None   Principal Problem: None                Active Insurance as of 11/19/2020     Primary Coverage     Payor Plan Insurance Group Employer/Plan Group    [employer] BLUE [employer] [facility] [email or web address] BLUE SHIELD PPO [id number]     Payor Plan Address Payor Plan Phone Number Payor Plan Fax Number Effective Dates    [address] [phone]  1/1/2015 - None Entered    [facility] [id number]       Subscriber Name Subscriber Birth Date Member ID       TITUS BARRETT 1970 [id number]                 Emergency Contacts      (Rel.) Home Phone Work Phone Mobile Phone    BarrettMichel ramirez (Spouse) [phone] -- [phone]    Maegan Barrett (Daughter) -- -- [phone]    Azeb Molina (Sister) -- -- --               Discharge Summary      Lázaro Ash MD at 11/22/20 1326        Discharge summary    Date of admission 11/19/2020  Date of discharge 11/22/2020    Final diagnosis  Acute kidney injury resolved  Chronic kidney disease stage IV  Status post kidney transplant in 1995  Sepsis   Diabetes mellitus with low blood sugar  Hypertension  Hyperlipidemia  Chronic anemia   Gastroesophageal " reflux disease    Discharge medications    Current Facility-Administered Medications:   •  acetaminophen (TYLENOL) tablet 650 mg, 650 mg, Oral, Q4H PRN, Lázaro Ash MD, 650 mg at 11/21/20 2245  •  allopurinol (ZYLOPRIM) tablet 100 mg, 100 mg, Oral, Nightly, Lázaro Ahs MD, 100 mg at 11/21/20 2038  •  atenolol (TENORMIN) tablet 50 mg, 50 mg, Oral, Q24H, Lázaro Ash MD, 50 mg at 11/21/20 2243  •  buPROPion XL (WELLBUTRIN XL) 24 hr tablet 300 mg, 300 mg, Oral, Daily, Lázaro Ash MD, 300 mg at 11/22/20 0941  •  famotidine (PEPCID) tablet 20 mg, 20 mg, Oral, BID, Lázaro Ash MD, 20 mg at 11/22/20 0941  •  hydrOXYzine (ATARAX) tablet 25 mg, 25 mg, Oral, Nightly, Lázaro Ash MD, 25 mg at 11/21/20 2037  •  insulin lispro (humaLOG) injection 0-7 Units, 0-7 Units, Subcutaneous, TID AC, Lázaro Ash MD, 5 Units at 11/22/20 0830  •  levoFLOXacin (LEVAQUIN) tablet 500 mg, 500 mg, Oral, Every Other Day, Lee Welsh MD, 500 mg at 11/21/20 2037  •  ondansetron (ZOFRAN) injection 4 mg, 4 mg, Intravenous, Q6H PRN, Lázaro Ash MD, 4 mg at 11/19/20 1842  •  ondansetron ODT (ZOFRAN-ODT) disintegrating tablet 4 mg, 4 mg, Oral, Q6H PRN, Lázaro Ash MD  •  paricalcitol (ZEMPLAR) capsule 2 mcg, 2 mcg, Oral, Nightly, Lázaro Ash MD, 2 mcg at 11/21/20 2037  •  predniSONE (DELTASONE) tablet 5 mg, 5 mg, Oral, Nightly, Lázaro Ash MD, 5 mg at 11/21/20 2037  •  sirolimus (RAPAMUNE) tablet 2 mg, 2 mg, Oral, Daily, Lázaro Ash MD, 2 mg at 11/21/20 2300  •  sodium bicarbonate tablet 1,300 mg, 1,300 mg, Oral, TID, Lázaro Ash MD, 1,300 mg at 11/22/20 0941  •  [COMPLETED] Insert peripheral IV, , , Once **AND** sodium chloride 0.9 % flush 10 mL, 10 mL, Intravenous, PRN, Rachelle Han, APRN, 10 mL at 11/21/20 0855     Consults obtained  Nephrology  Urology  Infectious disease  Nutrition    Procedures  None    Hospital course  50-year white female with history of kidney transplant in 1995 other medical problem  diabetes hypertension hyperlipidemia and chronic kidney disease stage IV admitted through emergency room with fever chills abdominal pain nausea vomiting diarrhea started few days prior to admission.  Patient work-up in ER revealed developing sepsis and worsening anemia admit for management.  Patient admitted received fluids and empiric antibiotics followed by nephrology and infectious disease.  Patient cultures remains negative but she treated with broad-spectrum cefepime and vancomycin.  Patient has difficulty with IV access and infections recommend to change to oral Levaquin and can be discharged.  Patient has been afebrile and blood pressure stabilized and kidney function returned to baseline with BUN of 22 creatinine 2.75 and hemoglobin of 9.4 after transfusion 2 units.  Patient has perinephric fluid collection urology consult obtained which is pending at this time but she wants to go home and clear from nephrology and infectious disease to be discharged on oral antibiotics if no need for surgical drainage.  Patient also has a repeated CT which is also pending.  Patient is clinically stable and wants to go home.    Discharge diet regular    Activity as tolerated    Medication as above    Follow-up with primary doctor in 1 week and follow-up with nephrology urology and infectious disease per their instruction and take medication as directed    KALYANI DINERO MD    Electronically signed by Kalyani Dinero MD at 11/22/20 1138

## 2020-12-02 ENCOUNTER — READMISSION MANAGEMENT (OUTPATIENT)
Dept: CALL CENTER | Facility: HOSPITAL | Age: 50
End: 2020-12-02

## 2020-12-02 NOTE — OUTREACH NOTE
Sepsis Week 2 Survey      Responses   [facility] patient discharged from?  [place]   Does the patient have one of the following disease processes/diagnoses(primary or secondary)?  Sepsis   Week 2 attempt successful?  Yes   Call start time  1150   Call end time  1153   Discharge diagnosis  YOEL,  Chronic kidney disease stage IV,  Sepsis,  Hx of kidney transplant   Medication alerts for this patient  been on IV antibiotics at outlying [facility]   Meds reviewed with patient/caregiver?  Yes   Is the patient taking all medications as directed (includes completed medication regime)?  Yes   Has the patient kept scheduled appointments due by today?  Yes   Psychosocial issues?  No   Comments  Pt went back to another hosp after DC from  where she was on IV anbx. She is home now doing well and has returned to work & baseline.    What is the patient's perception of their health status since discharge?  Returned to baseline/stable   Is the patient/caregiver able to teach back Sepsis?  S - Short of breath   Is patient/caregiver able to teach back steps to recovery at home?  Rest and regain strength   Is the patient/caregiver able to teach back signs and symptoms of worsening condition:  Fever, Rapid heart rate (>90)   Is the patient/caregiver able to teach back the hierarchy of who to call/visit for symptoms/problems? PCP, Specialist, Home health nurse, Urgent Care, ED, 911  Yes   Week 2 call completed?  Yes   Revoked  No further contact(revokes)-requires comment   Is the patient interested in additional calls from an ambulatory ?  NOTE:  applies to high risk patients requiring additional follow-up.  No   Graduated/Revoked comments  baseline/back to work.           Lily Gonzalez RN

## 2021-01-15 NOTE — PATIENT INSTRUCTIONS
Attempted to give report to 12T RN, RN was unavailable. Tere CRAVEN, LILLIE endorsed to give report to 12T RN.    Bacterial Conjunctivitis  Bacterial conjunctivitis, commonly called pink eye, is an inflammation of the clear membrane that covers the white part of the eye (conjunctiva). The inflammation can also happen on the underside of the eyelids. The blood vessels in the conjunctiva become inflamed, causing the eye to become red or pink. Bacterial conjunctivitis may spread easily from one eye to another and from person to person (contagious).   CAUSES   Bacterial conjunctivitis is caused by bacteria. The bacteria may come from your own skin, your upper respiratory tract, or from someone else with bacterial conjunctivitis.  SYMPTOMS   The normally white color of the eye or the underside of the eyelid is usually pink or red. The pink eye is usually associated with irritation, tearing, and some sensitivity to light. Bacterial conjunctivitis is often associated with a thick, yellowish discharge from the eye. The discharge may turn into a crust on the eyelids overnight, which causes your eyelids to stick together. If a discharge is present, there may also be some blurred vision in the affected eye.  DIAGNOSIS   Bacterial conjunctivitis is diagnosed by your caregiver through an eye exam and the symptoms that you report. Your caregiver looks for changes in the surface tissues of your eyes, which may point to the specific type of conjunctivitis. A sample of any discharge may be collected on a cotton-tip swab if you have a severe case of conjunctivitis, if your cornea is affected, or if you keep getting repeat infections that do not respond to treatment. The sample will be sent to a lab to see if the inflammation is caused by a bacterial infection and to see if the infection will respond to antibiotic medicines.  TREATMENT   · Bacterial conjunctivitis is treated with antibiotics. Antibiotic eyedrops are most often used. However, antibiotic ointments are also available. Antibiotics pills are sometimes used. Artificial tears or eye  washes may ease discomfort.  HOME CARE INSTRUCTIONS   · To ease discomfort, apply a cool, clean washcloth to your eye for 10-20 minutes, 3-4 times a day.  · Gently wipe away any drainage from your eye with a warm, wet washcloth or a cotton ball.  · Wash your hands often with soap and water. Use paper towels to dry your hands.  · Do not share towels or washcloths. This may spread the infection.  · Change or wash your pillowcase every day.  · You should not use eye makeup until the infection is gone.  · Do not operate machinery or drive if your vision is blurred.  · Stop using contact lenses. Ask your caregiver how to sterilize or replace your contacts before using them again. This depends on the type of contact lenses that you use.  · When applying medicine to the infected eye, do not touch the edge of your eyelid with the eyedrop bottle or ointment tube.  SEEK IMMEDIATE MEDICAL CARE IF:   · Your infection has not improved within 3 days after beginning treatment.  · You had yellow discharge from your eye and it returns.  · You have increased eye pain.  · Your eye redness is spreading.  · Your vision becomes blurred.  · You have a fever or persistent symptoms for more than 2-3 days.  · You have a fever and your symptoms suddenly get worse.  · You have facial pain, redness, or swelling.  MAKE SURE YOU:   · Understand these instructions.  · Will watch your condition.  · Will get help right away if you are not doing well or get worse.     This information is not intended to replace advice given to you by your health care provider. Make sure you discuss any questions you have with your health care provider.     Document Released: 12/18/2006 Document Revised: 01/08/2016 Document Reviewed: 05/20/2013  [employer] Interactive Patient Education ©2016 [employer]

## 2021-03-26 ENCOUNTER — BULK ORDERING (OUTPATIENT)
Dept: CASE MANAGEMENT | Facility: OTHER | Age: 51
End: 2021-03-26

## 2021-03-26 DIAGNOSIS — Z23 IMMUNIZATION DUE: ICD-10-CM

## 2021-08-23 ENCOUNTER — APPOINTMENT (OUTPATIENT)
Dept: WOMENS IMAGING | Facility: HOSPITAL | Age: 51
End: 2021-08-23

## 2021-08-23 PROCEDURE — 77062 BREAST TOMOSYNTHESIS BI: CPT | Performed by: RADIOLOGY

## 2021-08-23 PROCEDURE — G0279 TOMOSYNTHESIS, MAMMO: HCPCS | Performed by: RADIOLOGY

## 2021-08-23 PROCEDURE — 76641 ULTRASOUND BREAST COMPLETE: CPT | Performed by: RADIOLOGY

## 2021-08-23 PROCEDURE — 77066 DX MAMMO INCL CAD BI: CPT | Performed by: RADIOLOGY

## 2022-08-25 ENCOUNTER — APPOINTMENT (OUTPATIENT)
Dept: WOMENS IMAGING | Facility: HOSPITAL | Age: 52
End: 2022-08-25

## 2022-08-25 PROCEDURE — 76642 ULTRASOUND BREAST LIMITED: CPT | Performed by: RADIOLOGY

## 2022-08-25 PROCEDURE — G0279 TOMOSYNTHESIS, MAMMO: HCPCS | Performed by: RADIOLOGY

## 2022-08-25 PROCEDURE — 77066 DX MAMMO INCL CAD BI: CPT | Performed by: RADIOLOGY

## 2022-08-25 PROCEDURE — 77062 BREAST TOMOSYNTHESIS BI: CPT | Performed by: RADIOLOGY

## 2023-05-05 ENCOUNTER — APPOINTMENT (OUTPATIENT)
Dept: WOMENS IMAGING | Facility: HOSPITAL | Age: 53
End: 2023-05-05
Payer: COMMERCIAL

## 2023-05-05 PROCEDURE — 77065 DX MAMMO INCL CAD UNI: CPT | Performed by: RADIOLOGY

## 2023-05-05 PROCEDURE — 76642 ULTRASOUND BREAST LIMITED: CPT | Performed by: RADIOLOGY

## 2023-05-05 PROCEDURE — 77061 BREAST TOMOSYNTHESIS UNI: CPT | Performed by: RADIOLOGY

## 2023-05-05 PROCEDURE — G0279 TOMOSYNTHESIS, MAMMO: HCPCS | Performed by: RADIOLOGY

## (undated) DEVICE — DRSNG WND GZ CURAD OIL EMULSION 3X8IN LF STRL 1PK

## (undated) DEVICE — PILLW ABD SM

## (undated) DEVICE — PREP SOL POVIDONE/IODINE BT 4OZ

## (undated) DEVICE — SPNG GZ WOVN 4X4IN 12PLY 10/BX STRL

## (undated) DEVICE — ENCORE® LATEX ORTHO SIZE 8.5, STERILE LATEX POWDER-FREE SURGICAL GLOVE: Brand: ENCORE

## (undated) DEVICE — T4 ZIPPER TOGA, (L/XL)

## (undated) DEVICE — CULT AER/ANAEROB FASTIDIOUS BACT

## (undated) DEVICE — GLV SURG TRIUMPH CLASSIC PF LTX 8.5 STRL

## (undated) DEVICE — DRAPE,HIP,W/POUCHES,STERILE: Brand: MEDLINE

## (undated) DEVICE — ENCORE® LATEX ORTHO SIZE 6.5, STERILE LATEX POWDER-FREE SURGICAL GLOVE: Brand: ENCORE

## (undated) DEVICE — GLV SURG BIOGEL LTX PF 6 1/2

## (undated) DEVICE — GLV SURG BIOGEL LTX PF 8 1/2

## (undated) DEVICE — CONN TBG Y 5 IN 1 LF STRL

## (undated) DEVICE — DECANT BG O JET

## (undated) DEVICE — PAD,ABDOMINAL,8"X10",ST,LF: Brand: MEDLINE

## (undated) DEVICE — PK HIP TOTL 40

## (undated) DEVICE — GLV SURG TRIUMPH CLASSIC PF LTX 8 STRL

## (undated) DEVICE — SYR CONTRL LUERLOK 10CC

## (undated) DEVICE — GLV SURG TRIUMPH CLASSIC PF LTX 6.5 STRL

## (undated) DEVICE — ANTIBACTERIAL UNDYED BRAIDED (POLYGLACTIN 910), SYNTHETIC ABSORBABLE SUTURE: Brand: COATED VICRYL

## (undated) DEVICE — SUT ETHIB 0 CT1 CR8 18IN CX21D

## (undated) DEVICE — DRAPE,U/ SHT,SPLIT,PLAS,STERIL: Brand: MEDLINE

## (undated) DEVICE — NDL SPINE 22G 31/2IN BLK

## (undated) DEVICE — DRAPE,REIN 53X77,STERILE: Brand: MEDLINE

## (undated) DEVICE — IMPLANTABLE DEVICE
Type: IMPLANTABLE DEVICE | Site: HIP | Status: NON-FUNCTIONAL
Removed: 2017-06-28

## (undated) DEVICE — SUT VIC 0 CT1 CR8 18IN J840D

## (undated) DEVICE — APPL CHLORAPREP W/TINT 26ML ORNG